# Patient Record
Sex: FEMALE | Race: ASIAN | NOT HISPANIC OR LATINO | Employment: STUDENT | URBAN - METROPOLITAN AREA
[De-identification: names, ages, dates, MRNs, and addresses within clinical notes are randomized per-mention and may not be internally consistent; named-entity substitution may affect disease eponyms.]

---

## 2023-05-24 ENCOUNTER — APPOINTMENT (OUTPATIENT)
Dept: ULTRASOUND IMAGING | Facility: CLINIC | Age: 28
End: 2023-05-24
Attending: EMERGENCY MEDICINE
Payer: COMMERCIAL

## 2023-05-24 ENCOUNTER — HOSPITAL ENCOUNTER (EMERGENCY)
Facility: CLINIC | Age: 28
Discharge: HOME | End: 2023-05-24
Attending: EMERGENCY MEDICINE | Admitting: EMERGENCY MEDICINE
Payer: COMMERCIAL

## 2023-05-24 VITALS
BODY MASS INDEX: 19.49 KG/M2 | HEART RATE: 96 BPM | WEIGHT: 121.25 LBS | RESPIRATION RATE: 18 BRPM | SYSTOLIC BLOOD PRESSURE: 115 MMHG | OXYGEN SATURATION: 100 % | TEMPERATURE: 96.9 F | DIASTOLIC BLOOD PRESSURE: 70 MMHG | HEIGHT: 66 IN

## 2023-05-24 DIAGNOSIS — R11.2 NAUSEA AND VOMITING, UNSPECIFIED VOMITING TYPE: ICD-10-CM

## 2023-05-24 DIAGNOSIS — D25.9 UTERINE LEIOMYOMA, UNSPECIFIED LOCATION: ICD-10-CM

## 2023-05-24 LAB
ALBUMIN UR-MCNC: 20 MG/DL
ANION GAP SERPL CALCULATED.3IONS-SCNC: 12 MMOL/L (ref 7–15)
APPEARANCE UR: CLEAR
BACTERIA #/AREA URNS HPF: ABNORMAL /HPF
BASOPHILS # BLD AUTO: 0 10E3/UL (ref 0–0.2)
BASOPHILS NFR BLD AUTO: 0 %
BILIRUB UR QL STRIP: NEGATIVE
BUN SERPL-MCNC: 11 MG/DL (ref 6–20)
CALCIUM SERPL-MCNC: 9.1 MG/DL (ref 8.6–10)
CHLORIDE SERPL-SCNC: 104 MMOL/L (ref 98–107)
COLOR UR AUTO: YELLOW
CREAT SERPL-MCNC: 0.49 MG/DL (ref 0.51–0.95)
DEPRECATED HCO3 PLAS-SCNC: 22 MMOL/L (ref 22–29)
EOSINOPHIL # BLD AUTO: 0 10E3/UL (ref 0–0.7)
EOSINOPHIL NFR BLD AUTO: 0 %
ERYTHROCYTE [DISTWIDTH] IN BLOOD BY AUTOMATED COUNT: 16.4 % (ref 10–15)
GFR SERPL CREATININE-BSD FRML MDRD: >90 ML/MIN/1.73M2
GLUCOSE SERPL-MCNC: 95 MG/DL (ref 70–99)
GLUCOSE UR STRIP-MCNC: 50 MG/DL
HCG SERPL QL: NEGATIVE
HCT VFR BLD AUTO: 36.7 % (ref 35–47)
HGB BLD-MCNC: 11.5 G/DL (ref 11.7–15.7)
HGB UR QL STRIP: ABNORMAL
HOLD SPECIMEN: NORMAL
IMM GRANULOCYTES # BLD: 0.1 10E3/UL
IMM GRANULOCYTES NFR BLD: 0 %
KETONES UR STRIP-MCNC: 80 MG/DL
LEUKOCYTE ESTERASE UR QL STRIP: NEGATIVE
LYMPHOCYTES # BLD AUTO: 1.7 10E3/UL (ref 0.8–5.3)
LYMPHOCYTES NFR BLD AUTO: 12 %
MCH RBC QN AUTO: 23.6 PG (ref 26.5–33)
MCHC RBC AUTO-ENTMCNC: 31.3 G/DL (ref 31.5–36.5)
MCV RBC AUTO: 75 FL (ref 78–100)
MONOCYTES # BLD AUTO: 0.6 10E3/UL (ref 0–1.3)
MONOCYTES NFR BLD AUTO: 4 %
MUCOUS THREADS #/AREA URNS LPF: PRESENT /LPF
NEUTROPHILS # BLD AUTO: 11.9 10E3/UL (ref 1.6–8.3)
NEUTROPHILS NFR BLD AUTO: 84 %
NITRATE UR QL: NEGATIVE
NRBC # BLD AUTO: 0 10E3/UL
NRBC BLD AUTO-RTO: 0 /100
PH UR STRIP: 6.5 [PH] (ref 5–7)
PLATELET # BLD AUTO: 409 10E3/UL (ref 150–450)
POTASSIUM SERPL-SCNC: 3.7 MMOL/L (ref 3.4–5.3)
RBC # BLD AUTO: 4.88 10E6/UL (ref 3.8–5.2)
RBC URINE: 133 /HPF
SODIUM SERPL-SCNC: 138 MMOL/L (ref 136–145)
SP GR UR STRIP: 1.03 (ref 1–1.03)
SQUAMOUS EPITHELIAL: 1 /HPF
UROBILINOGEN UR STRIP-MCNC: NORMAL MG/DL
WBC # BLD AUTO: 14.3 10E3/UL (ref 4–11)
WBC URINE: 1 /HPF

## 2023-05-24 PROCEDURE — 36415 COLL VENOUS BLD VENIPUNCTURE: CPT | Performed by: EMERGENCY MEDICINE

## 2023-05-24 PROCEDURE — 84703 CHORIONIC GONADOTROPIN ASSAY: CPT | Performed by: EMERGENCY MEDICINE

## 2023-05-24 PROCEDURE — 250N000011 HC RX IP 250 OP 636: Performed by: EMERGENCY MEDICINE

## 2023-05-24 PROCEDURE — 99284 EMERGENCY DEPT VISIT MOD MDM: CPT | Mod: 25

## 2023-05-24 PROCEDURE — 80048 BASIC METABOLIC PNL TOTAL CA: CPT | Performed by: EMERGENCY MEDICINE

## 2023-05-24 PROCEDURE — 76856 US EXAM PELVIC COMPLETE: CPT

## 2023-05-24 PROCEDURE — 81001 URINALYSIS AUTO W/SCOPE: CPT | Performed by: EMERGENCY MEDICINE

## 2023-05-24 PROCEDURE — 96360 HYDRATION IV INFUSION INIT: CPT

## 2023-05-24 PROCEDURE — 258N000003 HC RX IP 258 OP 636

## 2023-05-24 PROCEDURE — 85004 AUTOMATED DIFF WBC COUNT: CPT | Performed by: EMERGENCY MEDICINE

## 2023-05-24 RX ORDER — SODIUM CHLORIDE 9 MG/ML
INJECTION, SOLUTION INTRAVENOUS CONTINUOUS
Status: DISCONTINUED | OUTPATIENT
Start: 2023-05-24 | End: 2023-05-24 | Stop reason: HOSPADM

## 2023-05-24 RX ORDER — FERROUS SULFATE 325(65) MG
325 TABLET ORAL
Qty: 30 TABLET | Refills: 0 | Status: SHIPPED | OUTPATIENT
Start: 2023-05-24 | End: 2024-01-12

## 2023-05-24 RX ORDER — ONDANSETRON 2 MG/ML
4 INJECTION INTRAMUSCULAR; INTRAVENOUS EVERY 30 MIN PRN
Status: DISCONTINUED | OUTPATIENT
Start: 2023-05-24 | End: 2023-05-24 | Stop reason: HOSPADM

## 2023-05-24 RX ORDER — ONDANSETRON 4 MG/1
4 TABLET, ORALLY DISINTEGRATING ORAL ONCE
Status: COMPLETED | OUTPATIENT
Start: 2023-05-24 | End: 2023-05-24

## 2023-05-24 RX ORDER — ONDANSETRON 4 MG/1
4 TABLET, ORALLY DISINTEGRATING ORAL EVERY 8 HOURS PRN
Qty: 15 TABLET | Refills: 0 | Status: SHIPPED | OUTPATIENT
Start: 2023-05-24 | End: 2024-01-12

## 2023-05-24 RX ORDER — ACETAMINOPHEN 325 MG/1
975 TABLET ORAL ONCE
Status: COMPLETED | OUTPATIENT
Start: 2023-05-24 | End: 2023-05-24

## 2023-05-24 RX ORDER — IBUPROFEN 600 MG/1
600 TABLET, FILM COATED ORAL ONCE
Status: COMPLETED | OUTPATIENT
Start: 2023-05-24 | End: 2023-05-24

## 2023-05-24 RX ADMIN — ONDANSETRON 4 MG: 4 TABLET, ORALLY DISINTEGRATING ORAL at 20:07

## 2023-05-24 RX ADMIN — SODIUM CHLORIDE 1000 ML: 9 INJECTION, SOLUTION INTRAVENOUS at 20:37

## 2023-05-24 ASSESSMENT — ACTIVITIES OF DAILY LIVING (ADL)
ADLS_ACUITY_SCORE: 35
ADLS_ACUITY_SCORE: 35

## 2023-05-24 NOTE — ED TRIAGE NOTES
Pt presents with complaint of abd pain. Pt is currently on menses and states that she has abd pain with each cycle. Pt also states that she has vomited 13 times since this morning.      Triage Assessment     Row Name 05/24/23 2794       Triage Assessment (Adult)    Airway WDL WDL       Respiratory WDL    Respiratory WDL WDL       Skin Circulation/Temperature WDL    Skin Circulation/Temperature WDL WDL       Cardiac WDL    Cardiac WDL WDL       Peripheral/Neurovascular WDL    Peripheral Neurovascular WDL WDL       Cognitive/Neuro/Behavioral WDL    Cognitive/Neuro/Behavioral WDL WDL               [facility] - [phone]

## 2023-05-24 NOTE — ED NOTES
"Tele-PIT/Intake Evaluation      Video-Visit Details    Type of service:  Video Visit    Video Start Time (time video started): 5:45 PM  Video End Time (time video stopped): 5:50 PM   Originating Location (pt. Location):  [facility]  Distant Location (provider location):  [facility]  Mode of Communication:  Video Conference via Dizzion  Patient verbally consented to O-RID televisit.    History:  Patient presents with heavy vaginal bleeding and nausea vomiting which is consistent with previous menstrual cycles, she has been worked up in the past and has been told she has endometriosis.  She has vomited 13 times today and says she has not been able to keep any fluids down therefore she thought she should be seen in the emergency department.    Exam:  Cardiovascular: Well-perfused  Lungs: No respiratory distress  Patient Vitals for the past 24 hrs:   BP Temp Temp src Pulse Resp SpO2 Height Weight   05/24/23 1740 138/80 96.9  F (36.1  C) Temporal 88 18 100 % 1.676 m (5' 6\") 55 kg (121 lb 4.1 oz)       Appropriate interventions for symptom management were initiated if applicable.  Appropriate diagnostic tests were initiated if indicated.    Important information for subsequent clinician:  History of endometriosis and heavy periods, laboratory work-up undertaken and ultrasound ordered given the vomiting.    I briefly evaluated the patient and developed an initial plan of care. I discussed this plan and explained that this brief interaction does not constitute a full evaluation. Patient/family understands that they should wait to be fully evaluated and discuss any test results with another clinician prior to leaving the hospital.     Gee Soto MD  05/24/23 1751    "

## 2023-05-25 NOTE — DISCHARGE INSTRUCTIONS
Stay hydrated while taking Ferrous sulfate. This medication can cause constipation.  Can take MiraLAX if constipation occurs, according to label instructions.

## 2023-05-25 NOTE — ED PROVIDER NOTES
"ED ATTENDING PHYSICIAN NOTE:   I evaluated this patient in conjunction with Raven Avila PA-C  I have participated in the care of the patient and personally performed key elements of the history, exam, and medical decision making.      HPI:   Danny Villaeral is a 27 year old female who presents with nausea and vomiting. The patient reports that at baseline during her period she has abdominal pain, nausea, and vomiting. Her periods typically last about 25 days each. Her symptoms however, are more severe. She is on the 3rd day of her period; normal bleeding amount. She has vomited 13 times today. No fever or urinary symptoms. No sick contact. The patient denies any marijuana use.  Patient has had similar symptoms for the last 1.5 years.    Independent Historian:   None - Patient Only    Review of External Notes: N/A     EXAM:   /70   Pulse 96   Temp 96.9  F (36.1  C) (Temporal)   Resp 18   Ht 1.676 m (5' 6\")   Wt 55 kg (121 lb 4.1 oz)   SpO2 100%   BMI 19.57 kg/m      General: Alert and cooperative with exam. Patient in mild distress. Normal mentation.  Well-appearing  Head:  Scalp is NC/AT  Eyes:  No scleral icterus, PERRL  ENT:  The external nose and ears are normal. The oropharynx is normal and without erythema; mucus membranes are moist. Uvula midline, no evidence of deep space infection.  Neck:  Normal range of motion without rigidity.  CV:  Regular rate and rhythm    No pathologic murmur   Resp:  Breath sounds are clear bilaterally    Non-labored, no retractions or accessory muscle use  GI:  Abdomen is soft, no distension, no tenderness. No peritoneal signs  MS:  No lower extremity edema   Skin:  Warm and dry, No rash or lesions noted.  Neuro: Oriented x 3. No gross motor deficits.    Independent Interpretation (X-rays, CTs, rhythm strip):  None    Consultations/Discussion of Management or Tests:  None     Social Determinants of Health affecting care:   None     MEDICAL DECISION " MAKING/ASSESSMENT AND PLAN:    Patient is a 27-year-old female who presents with nausea and vomiting as well as lower abdominal pain/cramping during current menstrual period; has had similar episodes for the last 1-1/2 years.  Patient's medical history and records reviewed.  Patient's labs are notable for mildly elevated white count (14.3; likely stress/demargination reaction from vomiting), mild anemia (hemoglobin 11.5 with low MCV raising concern for potential iron deficiency; prescribed oral iron supplementation), and UA without evidence of infection though does demonstrate blood likely from current menses.  Abdominal exam benign.  Pelvic ultrasound demonstrates fibroid.  Pregnancy test is negative.  Patient was provided Zofran and IV fluids with improvement and able to tolerate oral intake.  No evidence of emergent process.  Discussed supportive care and provided prescription for Zofran.  Additionally recommended the patient establish care with PCP.  Return precautions discussed.  Patient discharged home.    DIAGNOSIS:     ICD-10-CM    1. Nausea and vomiting, unspecified vomiting type  R11.2       2. Uterine leiomyoma, unspecified location  D25.9           DISPOSITION:   The patient was discharged to home.      Scribe Disclosure:  Harley JACKSON, am serving as a scribe at 7:32 PM on 5/24/2023 to document services personally performed by Shawn Cervantes DO based on my observations and the provider's statements to me.      5/24/2023  [facility] EMERGENCY DEPT     Shawn Cervantes DO  05/25/23 5978

## 2023-05-25 NOTE — ED PROVIDER NOTES
History     Chief Complaint:  Abdominal Pain       HPI   Danny Villareal is a 27 year old female with a history of endometriosis and uterine fibroid who presents to the emergency department for evaluation of vaginal bleeding, abdominal pain, nausea, and vomiting.  The patient states that she has had heavy menstrual periods since her period started when she was 12.  She has been evaluated for this in [facility] several times.  In the last year she has had a CT scan which showed infective enteritis and had an ultrasound which showed evidence of endometriosis and uterine fibroid per patient's printed records.  The patient notes that over the last 2 years her menstrual cycles have been regular but continues to have moderate to heavy vaginal bleeding, lower abdominal cramping, nausea, and approximately 12-20 episodes of vomiting per cycle.  She has been given a pain medication and Zofran for control of her symptoms.  The patient notes that she typically counteract her symptoms by avoiding any fluids or food and will take Zofran.  Today, the patient states that she had approximately 13 episodes of vomiting and has not been able to drink anything without eliciting this.  She has taken the oral Zofran without improvement of symptoms causing concern.  She denies any recent fever, chills, chest pain, shortness of breath, hematemesis, dysuria, hematuria, urinary frequency/urgency, vaginal pain, vaginal discharge, or diarrhea.  She states she is not sexually active and is not concerned for pregnancy or STI today.  Of note, she previously was taking birth control for her symptoms but stopped taking this when she moved to the  5 months ago.      Independent Historian:    The patient provides a history.    Review of External Notes:  Chart reviewed, no external notes available.    Medications:    Ondansetron PRN    Past Medical History:    Uterine fibroid  Endometriosis    Past Surgical History:    No pertinent past surgical  "history.       Physical Exam     Patient Vitals for the past 24 hrs:   BP Temp Temp src Pulse Resp SpO2 Height Weight   05/24/23 1740 138/80 96.9  F (36.1  C) Temporal 88 18 100 % 1.676 m (5' 6\") 55 kg (121 lb 4.1 oz)        Physical Exam  General: Alert, appears well-developed and well-nourished. Cooperative.     In moderate distress  HEENT:  Head:  Atraumatic  Ears:  External ears are normal   Eyes:   Conjunctivae normal and EOM are normal. No scleral icterus.    Pupils are equal, round, and reactive to light.   Neck:   Normal range of motion. Neck supple.  CV:  Normal rate, regular rhythm, normal heart sounds and radial pulses are 2+ and symmetric.  No murmur.  Resp:  Breath sounds are clear bilaterally    Non-labored, no retractions or accessory muscle use  GI:  TTP over suprapubic area > LLQ, RLQ with voluntary guarding. Mild TTP over epigastrium, causes nausea per patient. Abdomen is soft, no distension. No rebound. No CVA tenderness bilaterally.  MS:  Normal range of motion. No edema.    Normal strength in all 4 extremities.     Back atraumatic.  Skin:  Warm and dry.  No rash or lesions noted.  Neuro: Alert. Normal strength.  Sensation intact in all 4 extremities. GCS: 15  Psych:  Normal mood and affect.    Emergency Department Course     Imaging:  US Pelvis Cmpl wo Transvaginal w Abd/Pel Duplex Lmt   Final Result   IMPRESSION:     1.  Fibroid posterior body measures 4.5 cm.      2.  No abnormal adnexal masses.                 Report per radiology    Laboratory:  Labs Ordered and Resulted from Time of ED Arrival to Time of ED Departure   BASIC METABOLIC PANEL - Abnormal       Result Value    Sodium 138      Potassium 3.7      Chloride 104      Carbon Dioxide (CO2) 22      Anion Gap 12      Urea Nitrogen 11.0      Creatinine 0.49 (*)     Calcium 9.1      Glucose 95      GFR Estimate >90     ROUTINE UA WITH MICROSCOPIC REFLEX TO CULTURE - Abnormal    Color Urine Yellow      Appearance Urine Clear      Glucose " Urine 50 (*)     Bilirubin Urine Negative      Ketones Urine 80 (*)     Specific Gravity Urine 1.029      Blood Urine Large (*)     pH Urine 6.5      Protein Albumin Urine 20 (*)     Urobilinogen Urine Normal      Nitrite Urine Negative      Leukocyte Esterase Urine Negative      Bacteria Urine Few (*)     Mucus Urine Present (*)     RBC Urine 133 (*)     WBC Urine 1      Squamous Epithelials Urine 1     CBC WITH PLATELETS AND DIFFERENTIAL - Abnormal    WBC Count 14.3 (*)     RBC Count 4.88      Hemoglobin 11.5 (*)     Hematocrit 36.7      MCV 75 (*)     MCH 23.6 (*)     MCHC 31.3 (*)     RDW 16.4 (*)     Platelet Count 409      % Neutrophils 84      % Lymphocytes 12      % Monocytes 4      % Eosinophils 0      % Basophils 0      % Immature Granulocytes 0      NRBCs per 100 WBC 0      Absolute Neutrophils 11.9 (*)     Absolute Lymphocytes 1.7      Absolute Monocytes 0.6      Absolute Eosinophils 0.0      Absolute Basophils 0.0      Absolute Immature Granulocytes 0.1      Absolute NRBCs 0.0     HCG QUALITATIVE PREGNANCY - Normal    hCG Serum Qualitative Negative          Procedures   None    Emergency Department Course & Assessments:       Interventions:  Medications   0.9% sodium chloride BOLUS (1,000 mLs Intravenous $New Bag 5/24/23 2037)     Followed by   sodium chloride 0.9% infusion (has no administration in time range)   ondansetron (ZOFRAN) injection 4 mg (has no administration in time range)   ondansetron (ZOFRAN ODT) ODT tab 4 mg (4 mg Oral $Given 5/24/23 2007)   acetaminophen (TYLENOL) tablet 975 mg (975 mg Oral Not Given 5/24/23 2011)   ibuprofen (ADVIL/MOTRIN) tablet 600 mg (600 mg Oral Not Given 5/24/23 2012)        Assessments:  1907: Initial evaluation and assessment.  2101: Patient reassessed, feels improved after IV Zofran and fluids.  Patient in agreement with plan for discharge home with close OB/GYN follow-up.    Independent Interpretation (X-rays, CTs, rhythm  strip):  None    Consultations/Discussion of Management or Tests:  None       Social Determinants of Health affecting care:  None     Disposition:  The patient was discharged to home.     Impression & Plan    CMS Diagnoses: None    Medical Decision Making:  Danny Villareal is a 27-year-old female with a history of a uterine fibroid who presented to the emergency department for evaluation of abdominal cramping, vaginal bleeding, nausea, and vomiting.  The patient has had heavy vaginal bleeding, abdominal cramping, nausea, and multiple episodes of vomiting associated with menses over the last year and a half.  She was previously evaluated for this in [place] and has had CT and ultrasound imaging which showed evidence of a uterine fibroid.  She has been given birth control, pain medication, and Zofran for her symptoms which did not provide significant relief today prompting her presentation to the ER.  On exam, the patient has lower abdominal tenderness without any peritoneal signs.  Her vital signs are within normal limits and there is no evidence of fever or tachycardia.  UA was obtained which shows evidence of hematuria without infection likely attributed to her current menses as she denies any hematuria otherwise.  She has a mildly elevated white blood cell count of 14.3 which is most likely attributed to multiple episodes of vomiting along with a hemoglobin of 11.5 with low MVC likely due to heavy menses.  BMP does not show any concerning signs of acute kidney injury or electrolyte abnormalities and the patient is not pregnant.  Pelvic ultrasound was performed which shows a posterior fibroid measuring 4.5 cm without any other concerning pelvic pathology.  The patient feels improved following IV fluids and Zofran.  Given that these symptoms are consistent with patient's history, she feels improved following interventions in the ED, without any concerning lab or imaging findings, we felt she was safe for discharged  home at this time.  We provided prescriptions for the patient of Zofran and ferrous sulfate to take as directed.  She was advised to have close follow-up with OB/GYN within the next week for reevaluation and ongoing management of her symptoms.  She should return to the ED if she develops any new or worsening symptoms in the interim.  She is in understanding of this plan all questions were answered.      Diagnosis:    ICD-10-CM    1. Nausea and vomiting, unspecified vomiting type  R11.2       2. Uterine leiomyoma, unspecified location  D25.9            Discharge Medications:  New Prescriptions    FERROUS SULFATE (FEROSUL) 325 (65 FE) MG TABLET    Take 1 tablet (325 mg) by mouth daily (with breakfast)    ONDANSETRON (ZOFRAN ODT) 4 MG ODT TAB    Take 1 tablet (4 mg) by mouth every 8 hours as needed for nausea          Miley Avila PA-C  5/24/2023

## 2024-01-09 ENCOUNTER — HOSPITAL ENCOUNTER (EMERGENCY)
Facility: CLINIC | Age: 29
Discharge: LEFT BEFORE DISCHARGE | End: 2024-01-09
Admitting: EMERGENCY MEDICINE
Payer: COMMERCIAL

## 2024-01-09 VITALS
TEMPERATURE: 97.3 F | RESPIRATION RATE: 18 BRPM | OXYGEN SATURATION: 97 % | SYSTOLIC BLOOD PRESSURE: 112 MMHG | DIASTOLIC BLOOD PRESSURE: 73 MMHG | BODY MASS INDEX: 21.07 KG/M2 | HEART RATE: 92 BPM | WEIGHT: 139 LBS | HEIGHT: 68 IN

## 2024-01-09 LAB
ALBUMIN SERPL BCG-MCNC: 4.5 G/DL (ref 3.5–5.2)
ALP SERPL-CCNC: 58 U/L (ref 40–150)
ALT SERPL W P-5'-P-CCNC: 13 U/L (ref 0–50)
ANION GAP SERPL CALCULATED.3IONS-SCNC: 8 MMOL/L (ref 7–15)
AST SERPL W P-5'-P-CCNC: 18 U/L (ref 0–45)
BASOPHILS # BLD AUTO: 0.1 10E3/UL (ref 0–0.2)
BASOPHILS NFR BLD AUTO: 1 %
BILIRUB SERPL-MCNC: 0.2 MG/DL
BUN SERPL-MCNC: 7.7 MG/DL (ref 6–20)
CALCIUM SERPL-MCNC: 8.8 MG/DL (ref 8.6–10)
CHLORIDE SERPL-SCNC: 106 MMOL/L (ref 98–107)
CREAT SERPL-MCNC: 0.54 MG/DL (ref 0.51–0.95)
DEPRECATED HCO3 PLAS-SCNC: 26 MMOL/L (ref 22–29)
EGFRCR SERPLBLD CKD-EPI 2021: >90 ML/MIN/1.73M2
EOSINOPHIL # BLD AUTO: 0.2 10E3/UL (ref 0–0.7)
EOSINOPHIL NFR BLD AUTO: 2 %
ERYTHROCYTE [DISTWIDTH] IN BLOOD BY AUTOMATED COUNT: 16.5 % (ref 10–15)
GLUCOSE SERPL-MCNC: 130 MG/DL (ref 70–99)
HCG SERPL QL: NEGATIVE
HCT VFR BLD AUTO: 30.9 % (ref 35–47)
HGB BLD-MCNC: 9.3 G/DL (ref 11.7–15.7)
HOLD SPECIMEN: NORMAL
IMM GRANULOCYTES # BLD: 0 10E3/UL
IMM GRANULOCYTES NFR BLD: 0 %
LYMPHOCYTES # BLD AUTO: 3.6 10E3/UL (ref 0.8–5.3)
LYMPHOCYTES NFR BLD AUTO: 34 %
MCH RBC QN AUTO: 22.5 PG (ref 26.5–33)
MCHC RBC AUTO-ENTMCNC: 30.1 G/DL (ref 31.5–36.5)
MCV RBC AUTO: 75 FL (ref 78–100)
MONOCYTES # BLD AUTO: 0.9 10E3/UL (ref 0–1.3)
MONOCYTES NFR BLD AUTO: 8 %
NEUTROPHILS # BLD AUTO: 5.9 10E3/UL (ref 1.6–8.3)
NEUTROPHILS NFR BLD AUTO: 55 %
NRBC # BLD AUTO: 0 10E3/UL
NRBC BLD AUTO-RTO: 0 /100
PLATELET # BLD AUTO: 541 10E3/UL (ref 150–450)
POTASSIUM SERPL-SCNC: 3.7 MMOL/L (ref 3.4–5.3)
PROT SERPL-MCNC: 7.6 G/DL (ref 6.4–8.3)
RBC # BLD AUTO: 4.13 10E6/UL (ref 3.8–5.2)
SODIUM SERPL-SCNC: 140 MMOL/L (ref 135–145)
WBC # BLD AUTO: 10.7 10E3/UL (ref 4–11)

## 2024-01-09 PROCEDURE — 85025 COMPLETE CBC W/AUTO DIFF WBC: CPT | Performed by: EMERGENCY MEDICINE

## 2024-01-09 PROCEDURE — 36415 COLL VENOUS BLD VENIPUNCTURE: CPT | Performed by: EMERGENCY MEDICINE

## 2024-01-09 PROCEDURE — 84703 CHORIONIC GONADOTROPIN ASSAY: CPT | Performed by: EMERGENCY MEDICINE

## 2024-01-09 PROCEDURE — 99281 EMR DPT VST MAYX REQ PHY/QHP: CPT

## 2024-01-09 PROCEDURE — 80053 COMPREHEN METABOLIC PANEL: CPT | Performed by: EMERGENCY MEDICINE

## 2024-01-09 NOTE — ED TRIAGE NOTES
Pt presents to ED with vaginal bleeding and a large, pink clot that came out of her vagina that was about 6 inches long. Pt has been on norethindone for a month to stop her periods to see if she had fibroids or any other abnormality in her reproductive system. Pt took her last dose of the medication today when the bleeding started earlier this morning at 0800. Pt have an appointment scheduled for tomorrow morning where they are planning on doing a scan. Pt advised not to come in to the hospital today, but she was concerned about the large clot.     Triage Assessment (Adult)       Row Name 01/09/24 0037          Triage Assessment    Airway WDL WDL        Respiratory WDL    Respiratory WDL WDL        Skin Circulation/Temperature WDL    Skin Circulation/Temperature WDL WDL        Cardiac WDL    Cardiac WDL WDL        Peripheral/Neurovascular WDL    Peripheral Neurovascular WDL WDL        Cognitive/Neuro/Behavioral WDL    Cognitive/Neuro/Behavioral WDL WDL

## 2024-01-12 RX ORDER — NORETHINDRONE ACETATE 5 MG
10 TABLET ORAL DAILY
COMMUNITY
End: 2024-02-13

## 2024-01-14 ENCOUNTER — ANESTHESIA EVENT (OUTPATIENT)
Dept: SURGERY | Facility: CLINIC | Age: 29
End: 2024-01-14
Payer: COMMERCIAL

## 2024-01-14 ASSESSMENT — LIFESTYLE VARIABLES: TOBACCO_USE: 0

## 2024-01-14 NOTE — ANESTHESIA PREPROCEDURE EVALUATION
Anesthesia Pre-Procedure Evaluation    Patient: Danny Villareal   MRN: [id number] : 1995        Procedure : Procedure(s):  hysteroscopy with myosure polypectomy          Past Medical History:   Diagnosis Date    Anemia     Endometriosis     Fibroid uterus       No past surgical history on file.   No Known Allergies   Social History     Tobacco Use    Smoking status: Not on file    Smokeless tobacco: Not on file   Substance Use Topics    Alcohol use: Not on file      Wt Readings from Last 1 Encounters:   23 55 kg (121 lb 4.1 oz)        Anesthesia Evaluation   Pt has had prior anesthetic. Type: General.    No history of anesthetic complications       ROS/MED HX  ENT/Pulmonary:    (-) tobacco use   Neurologic:       Cardiovascular:       METS/Exercise Tolerance:     Hematologic:     (+)      anemia,          Musculoskeletal:       GI/Hepatic:       Renal/Genitourinary:       Endo: Comment: Endometriosis      Psychiatric/Substance Use:       Infectious Disease:       Malignancy:       Other:            Physical Exam    Airway        Mallampati: II   TM distance: > 3 FB   Neck ROM: full   Mouth opening: > 3 cm    Respiratory Devices and Support         Dental           Cardiovascular   cardiovascular exam normal          Pulmonary   pulmonary exam normal        breath sounds clear to auscultation           OUTSIDE LABS:  CBC:   Lab Results   Component Value Date    WBC 10.7 2024    WBC 14.3 (H) 2023    HGB 9.3 (L) 2024    HGB 11.5 (L) 2023    HCT 30.9 (L) 2024    HCT 36.7 2023     (H) 2024     2023     BMP:   Lab Results   Component Value Date     2024     2023    POTASSIUM 3.7 2024    POTASSIUM 3.7 2023    CHLORIDE 106 2024    CHLORIDE 104 2023    CO2 26 2024    CO2 22 2023    BUN 7.7 2024    BUN 11.0 2023    CR 0.54 2024    CR 0.49 (L) 2023     (H)  "01/09/2024    GLC 95 05/24/2023     COAGS: No results found for: \"PTT\", \"INR\", \"FIBR\"  POC:   Lab Results   Component Value Date    HCGS Negative 01/09/2024     HEPATIC:   Lab Results   Component Value Date    ALBUMIN 4.5 01/09/2024    PROTTOTAL 7.6 01/09/2024    ALT 13 01/09/2024    AST 18 01/09/2024    ALKPHOS 58 01/09/2024    BILITOTAL 0.2 01/09/2024     OTHER:   Lab Results   Component Value Date    VIGNESH 8.8 01/09/2024       Anesthesia Plan    ASA Status:  2    NPO Status:  NPO Appropriate    Anesthesia Type: MAC.     - Reason for MAC: straight local not clinically adequate              Consents    Anesthesia Plan(s) and associated risks, benefits, and realistic alternatives discussed. Questions answered and patient/representative(s) expressed understanding.     - Discussed:     - Discussed with:  Patient      - Extended Intubation/Ventilatory Support Discussed: No.      - Patient is DNR/DNI Status: No     Use of blood products discussed: Yes.     - Discussed with: Patient.     - Consented: consented to blood products            Reason for refusal: other.     Postoperative Care    Pain management: IV analgesics, Oral pain medications, Multi-modal analgesia.   PONV prophylaxis: Ondansetron (or other 5HT-3), Dexamethasone or Solumedrol, Background Propofol Infusion     Comments:               Rasheed Gomez, DO    I have reviewed the pertinent notes and labs in the chart from the past 30 days and (re)examined the patient.  Any updates or changes from those notes are reflected in this note.                "

## 2024-01-15 ENCOUNTER — ANESTHESIA (OUTPATIENT)
Dept: SURGERY | Facility: CLINIC | Age: 29
End: 2024-01-15
Payer: COMMERCIAL

## 2024-01-15 ENCOUNTER — HOSPITAL ENCOUNTER (OUTPATIENT)
Facility: CLINIC | Age: 29
Discharge: HOME | End: 2024-01-15
Attending: STUDENT IN AN ORGANIZED HEALTH CARE EDUCATION/TRAINING PROGRAM | Admitting: STUDENT IN AN ORGANIZED HEALTH CARE EDUCATION/TRAINING PROGRAM
Payer: COMMERCIAL

## 2024-01-15 VITALS
RESPIRATION RATE: 14 BRPM | DIASTOLIC BLOOD PRESSURE: 68 MMHG | HEIGHT: 62 IN | BODY MASS INDEX: 24.84 KG/M2 | OXYGEN SATURATION: 100 % | TEMPERATURE: 98.1 F | SYSTOLIC BLOOD PRESSURE: 97 MMHG | HEART RATE: 77 BPM | WEIGHT: 135 LBS

## 2024-01-15 DIAGNOSIS — Z98.890 STATUS POST HYSTEROSCOPIC POLYPECTOMY: Primary | ICD-10-CM

## 2024-01-15 LAB — HCG UR QL: NEGATIVE

## 2024-01-15 PROCEDURE — 250N000009 HC RX 250: Performed by: NURSE ANESTHETIST, CERTIFIED REGISTERED

## 2024-01-15 PROCEDURE — 258N000001 HC RX 258: Performed by: STUDENT IN AN ORGANIZED HEALTH CARE EDUCATION/TRAINING PROGRAM

## 2024-01-15 PROCEDURE — 360N000076 HC SURGERY LEVEL 3, PER MIN: Performed by: STUDENT IN AN ORGANIZED HEALTH CARE EDUCATION/TRAINING PROGRAM

## 2024-01-15 PROCEDURE — 88305 TISSUE EXAM BY PATHOLOGIST: CPT | Mod: TC | Performed by: STUDENT IN AN ORGANIZED HEALTH CARE EDUCATION/TRAINING PROGRAM

## 2024-01-15 PROCEDURE — 710N000012 HC RECOVERY PHASE 2, PER MINUTE: Performed by: STUDENT IN AN ORGANIZED HEALTH CARE EDUCATION/TRAINING PROGRAM

## 2024-01-15 PROCEDURE — 370N000017 HC ANESTHESIA TECHNICAL FEE, PER MIN: Performed by: STUDENT IN AN ORGANIZED HEALTH CARE EDUCATION/TRAINING PROGRAM

## 2024-01-15 PROCEDURE — 710N000009 HC RECOVERY PHASE 1, LEVEL 1, PER MIN: Performed by: STUDENT IN AN ORGANIZED HEALTH CARE EDUCATION/TRAINING PROGRAM

## 2024-01-15 PROCEDURE — 88305 TISSUE EXAM BY PATHOLOGIST: CPT | Mod: 26 | Performed by: STUDENT IN AN ORGANIZED HEALTH CARE EDUCATION/TRAINING PROGRAM

## 2024-01-15 PROCEDURE — 258N000003 HC RX IP 258 OP 636: Performed by: NURSE ANESTHETIST, CERTIFIED REGISTERED

## 2024-01-15 PROCEDURE — 250N000011 HC RX IP 250 OP 636: Performed by: NURSE ANESTHETIST, CERTIFIED REGISTERED

## 2024-01-15 PROCEDURE — 250N000013 HC RX MED GY IP 250 OP 250 PS 637: Performed by: ANESTHESIOLOGY

## 2024-01-15 PROCEDURE — 999N000141 HC STATISTIC PRE-PROCEDURE NURSING ASSESSMENT: Performed by: STUDENT IN AN ORGANIZED HEALTH CARE EDUCATION/TRAINING PROGRAM

## 2024-01-15 PROCEDURE — 81025 URINE PREGNANCY TEST: CPT | Performed by: ANESTHESIOLOGY

## 2024-01-15 PROCEDURE — 272N000001 HC OR GENERAL SUPPLY STERILE: Performed by: STUDENT IN AN ORGANIZED HEALTH CARE EDUCATION/TRAINING PROGRAM

## 2024-01-15 RX ORDER — FENTANYL CITRATE 0.05 MG/ML
50 INJECTION, SOLUTION INTRAMUSCULAR; INTRAVENOUS EVERY 5 MIN PRN
Status: DISCONTINUED | OUTPATIENT
Start: 2024-01-15 | End: 2024-01-15 | Stop reason: HOSPADM

## 2024-01-15 RX ORDER — DEXAMETHASONE SODIUM PHOSPHATE 4 MG/ML
INJECTION, SOLUTION INTRA-ARTICULAR; INTRALESIONAL; INTRAMUSCULAR; INTRAVENOUS; SOFT TISSUE PRN
Status: DISCONTINUED | OUTPATIENT
Start: 2024-01-15 | End: 2024-01-15

## 2024-01-15 RX ORDER — PROPOFOL 10 MG/ML
INJECTION, EMULSION INTRAVENOUS PRN
Status: DISCONTINUED | OUTPATIENT
Start: 2024-01-15 | End: 2024-01-15

## 2024-01-15 RX ORDER — ONDANSETRON 2 MG/ML
4 INJECTION INTRAMUSCULAR; INTRAVENOUS EVERY 30 MIN PRN
Status: DISCONTINUED | OUTPATIENT
Start: 2024-01-15 | End: 2024-01-15 | Stop reason: HOSPADM

## 2024-01-15 RX ORDER — SODIUM CHLORIDE, SODIUM LACTATE, POTASSIUM CHLORIDE, CALCIUM CHLORIDE 600; 310; 30; 20 MG/100ML; MG/100ML; MG/100ML; MG/100ML
INJECTION, SOLUTION INTRAVENOUS CONTINUOUS PRN
Status: DISCONTINUED | OUTPATIENT
Start: 2024-01-15 | End: 2024-01-15

## 2024-01-15 RX ORDER — FENTANYL CITRATE 50 UG/ML
INJECTION, SOLUTION INTRAMUSCULAR; INTRAVENOUS PRN
Status: DISCONTINUED | OUTPATIENT
Start: 2024-01-15 | End: 2024-01-15

## 2024-01-15 RX ORDER — HYDROMORPHONE HCL IN WATER/PF 6 MG/30 ML
0.4 PATIENT CONTROLLED ANALGESIA SYRINGE INTRAVENOUS EVERY 5 MIN PRN
Status: DISCONTINUED | OUTPATIENT
Start: 2024-01-15 | End: 2024-01-15 | Stop reason: HOSPADM

## 2024-01-15 RX ORDER — DIMENHYDRINATE 50 MG/ML
25 INJECTION, SOLUTION INTRAMUSCULAR; INTRAVENOUS
Status: DISCONTINUED | OUTPATIENT
Start: 2024-01-15 | End: 2024-01-15 | Stop reason: HOSPADM

## 2024-01-15 RX ORDER — LABETALOL HYDROCHLORIDE 5 MG/ML
10 INJECTION, SOLUTION INTRAVENOUS
Status: DISCONTINUED | OUTPATIENT
Start: 2024-01-15 | End: 2024-01-15 | Stop reason: HOSPADM

## 2024-01-15 RX ORDER — HYDROMORPHONE HCL IN WATER/PF 6 MG/30 ML
0.2 PATIENT CONTROLLED ANALGESIA SYRINGE INTRAVENOUS EVERY 5 MIN PRN
Status: DISCONTINUED | OUTPATIENT
Start: 2024-01-15 | End: 2024-01-15 | Stop reason: HOSPADM

## 2024-01-15 RX ORDER — FENTANYL CITRATE 0.05 MG/ML
25 INJECTION, SOLUTION INTRAMUSCULAR; INTRAVENOUS EVERY 5 MIN PRN
Status: DISCONTINUED | OUTPATIENT
Start: 2024-01-15 | End: 2024-01-15 | Stop reason: HOSPADM

## 2024-01-15 RX ORDER — DEXMEDETOMIDINE HYDROCHLORIDE 4 UG/ML
INJECTION, SOLUTION INTRAVENOUS PRN
Status: DISCONTINUED | OUTPATIENT
Start: 2024-01-15 | End: 2024-01-15

## 2024-01-15 RX ORDER — HYDROXYZINE HYDROCHLORIDE 25 MG/1
25 TABLET, FILM COATED ORAL ONCE
Status: COMPLETED | OUTPATIENT
Start: 2024-01-15 | End: 2024-01-15

## 2024-01-15 RX ORDER — HYDROXYZINE HYDROCHLORIDE 25 MG/1
25 TABLET, FILM COATED ORAL EVERY 6 HOURS PRN
Status: DISCONTINUED | OUTPATIENT
Start: 2024-01-15 | End: 2024-01-15 | Stop reason: HOSPADM

## 2024-01-15 RX ORDER — ACETAMINOPHEN 325 MG/1
975 TABLET ORAL ONCE
Status: DISCONTINUED | OUTPATIENT
Start: 2024-01-15 | End: 2024-01-15 | Stop reason: HOSPADM

## 2024-01-15 RX ORDER — PROPOFOL 10 MG/ML
INJECTION, EMULSION INTRAVENOUS CONTINUOUS PRN
Status: DISCONTINUED | OUTPATIENT
Start: 2024-01-15 | End: 2024-01-15

## 2024-01-15 RX ORDER — ONDANSETRON 4 MG/1
4 TABLET, ORALLY DISINTEGRATING ORAL EVERY 30 MIN PRN
Status: DISCONTINUED | OUTPATIENT
Start: 2024-01-15 | End: 2024-01-15 | Stop reason: HOSPADM

## 2024-01-15 RX ORDER — BUPIVACAINE HYDROCHLORIDE 2.5 MG/ML
INJECTION, SOLUTION EPIDURAL; INFILTRATION; INTRACAUDAL
Status: DISCONTINUED
Start: 2024-01-15 | End: 2024-01-15 | Stop reason: HOSPADM

## 2024-01-15 RX ORDER — ACETAMINOPHEN 325 MG/1
975 TABLET ORAL ONCE
Status: COMPLETED | OUTPATIENT
Start: 2024-01-15 | End: 2024-01-15

## 2024-01-15 RX ORDER — ONDANSETRON 2 MG/ML
INJECTION INTRAMUSCULAR; INTRAVENOUS PRN
Status: DISCONTINUED | OUTPATIENT
Start: 2024-01-15 | End: 2024-01-15

## 2024-01-15 RX ORDER — DICYCLOMINE HYDROCHLORIDE 10 MG/1
10 CAPSULE ORAL
COMMUNITY
End: 2024-02-13

## 2024-01-15 RX ORDER — IBUPROFEN 400 MG/1
800 TABLET, FILM COATED ORAL ONCE
Status: DISCONTINUED | OUTPATIENT
Start: 2024-01-15 | End: 2024-01-15 | Stop reason: HOSPADM

## 2024-01-15 RX ORDER — KETOROLAC TROMETHAMINE 30 MG/ML
INJECTION, SOLUTION INTRAMUSCULAR; INTRAVENOUS PRN
Status: DISCONTINUED | OUTPATIENT
Start: 2024-01-15 | End: 2024-01-15

## 2024-01-15 RX ORDER — SODIUM CHLORIDE, SODIUM LACTATE, POTASSIUM CHLORIDE, CALCIUM CHLORIDE 600; 310; 30; 20 MG/100ML; MG/100ML; MG/100ML; MG/100ML
INJECTION, SOLUTION INTRAVENOUS CONTINUOUS
Status: DISCONTINUED | OUTPATIENT
Start: 2024-01-15 | End: 2024-01-15 | Stop reason: HOSPADM

## 2024-01-15 RX ORDER — LIDOCAINE 40 MG/G
CREAM TOPICAL
Status: DISCONTINUED | OUTPATIENT
Start: 2024-01-15 | End: 2024-01-15 | Stop reason: HOSPADM

## 2024-01-15 RX ORDER — HYDRALAZINE HYDROCHLORIDE 20 MG/ML
2.5-5 INJECTION INTRAMUSCULAR; INTRAVENOUS EVERY 10 MIN PRN
Status: DISCONTINUED | OUTPATIENT
Start: 2024-01-15 | End: 2024-01-15 | Stop reason: HOSPADM

## 2024-01-15 RX ORDER — OXYCODONE HYDROCHLORIDE 5 MG/1
5 TABLET ORAL
Status: DISCONTINUED | OUTPATIENT
Start: 2024-01-15 | End: 2024-01-15 | Stop reason: HOSPADM

## 2024-01-15 RX ORDER — IBUPROFEN 800 MG/1
800 TABLET, FILM COATED ORAL EVERY 6 HOURS PRN
Qty: 30 TABLET | Refills: 0 | Status: SHIPPED | OUTPATIENT
Start: 2024-01-15 | End: 2024-02-13

## 2024-01-15 RX ORDER — LIDOCAINE HYDROCHLORIDE 20 MG/ML
INJECTION, SOLUTION INFILTRATION; PERINEURAL PRN
Status: DISCONTINUED | OUTPATIENT
Start: 2024-01-15 | End: 2024-01-15

## 2024-01-15 RX ORDER — EPINEPHRINE 1 MG/ML
INJECTION, SOLUTION INTRAMUSCULAR; SUBCUTANEOUS
Status: DISCONTINUED
Start: 2024-01-15 | End: 2024-01-15 | Stop reason: HOSPADM

## 2024-01-15 RX ADMIN — HYDROXYZINE HYDROCHLORIDE 25 MG: 25 TABLET, FILM COATED ORAL at 07:12

## 2024-01-15 RX ADMIN — PROPOFOL 40 MG: 10 INJECTION, EMULSION INTRAVENOUS at 07:37

## 2024-01-15 RX ADMIN — PROPOFOL 150 MCG/KG/MIN: 10 INJECTION, EMULSION INTRAVENOUS at 07:37

## 2024-01-15 RX ADMIN — MIDAZOLAM 2 MG: 1 INJECTION INTRAMUSCULAR; INTRAVENOUS at 07:35

## 2024-01-15 RX ADMIN — FENTANYL CITRATE 50 MCG: 50 INJECTION INTRAMUSCULAR; INTRAVENOUS at 07:37

## 2024-01-15 RX ADMIN — DEXAMETHASONE SODIUM PHOSPHATE 4 MG: 4 INJECTION, SOLUTION INTRA-ARTICULAR; INTRALESIONAL; INTRAMUSCULAR; INTRAVENOUS; SOFT TISSUE at 07:44

## 2024-01-15 RX ADMIN — ACETAMINOPHEN 975 MG: 325 TABLET, FILM COATED ORAL at 07:12

## 2024-01-15 RX ADMIN — PHENYLEPHRINE HYDROCHLORIDE 100 MCG: 10 INJECTION INTRAVENOUS at 08:06

## 2024-01-15 RX ADMIN — DEXMEDETOMIDINE HYDROCHLORIDE 8 MCG: 200 INJECTION INTRAVENOUS at 07:37

## 2024-01-15 RX ADMIN — ONDANSETRON 4 MG: 2 INJECTION INTRAMUSCULAR; INTRAVENOUS at 07:44

## 2024-01-15 RX ADMIN — PROPOFOL 30 MG: 10 INJECTION, EMULSION INTRAVENOUS at 07:46

## 2024-01-15 RX ADMIN — DEXMEDETOMIDINE HYDROCHLORIDE 12 MCG: 200 INJECTION INTRAVENOUS at 07:46

## 2024-01-15 RX ADMIN — SODIUM CHLORIDE, POTASSIUM CHLORIDE, SODIUM LACTATE AND CALCIUM CHLORIDE: 600; 310; 30; 20 INJECTION, SOLUTION INTRAVENOUS at 07:35

## 2024-01-15 RX ADMIN — KETOROLAC TROMETHAMINE 30 MG: 30 INJECTION, SOLUTION INTRAMUSCULAR at 08:14

## 2024-01-15 RX ADMIN — FENTANYL CITRATE 50 MCG: 50 INJECTION INTRAMUSCULAR; INTRAVENOUS at 07:46

## 2024-01-15 RX ADMIN — LIDOCAINE HYDROCHLORIDE 60 MG: 20 INJECTION, SOLUTION INFILTRATION; PERINEURAL at 07:37

## 2024-01-15 RX ADMIN — PHENYLEPHRINE HYDROCHLORIDE 100 MCG: 10 INJECTION INTRAVENOUS at 07:52

## 2024-01-15 ASSESSMENT — ACTIVITIES OF DAILY LIVING (ADL)
ADLS_ACUITY_SCORE: 35

## 2024-01-15 NOTE — H&P
OB HISTORY AND PHYSICAL    Patient Name: Danny Villareal   Admit Date: 115  MR #: [id number]   Acct #: [id number]   : 1995     Chief Complaint/Reason for Visit: hysteroscopy    History of Present Illness: Danny Villareal  is a 28 year old G0 F here for surgical mgmt of abnormal uterine bleeding. She has had heavy vaginal bleeding, TVUS c/w multiple endometrial polyps. She is on aygestin 10mg po daily, bleeding improved since starting med.    Review of Systems:  General: denies fevers  CV: denies CP  Pulm: denies SOB  Neuro: denies HA  Abd: denies N/V  Gyn: denies vaginal discharge  Skin: denies rashes  MSK: denies calf pain  Psych: denies depression     History:   OB History   No obstetric history on file.       Past Medical History:   Diagnosis Date    Anemia     Endometriosis     Fibroid uterus        History reviewed. No pertinent surgical history.    History reviewed. No pertinent family history.    Social History     Socioeconomic History    Marital status: Single     Spouse name: Not on file    Number of children: Not on file    Years of education: Not on file    Highest education level: Not on file   Occupational History    Not on file   Tobacco Use    Smoking status: Never    Smokeless tobacco: Never   Substance and Sexual Activity    Alcohol use: Yes    Drug use: Never    Sexual activity: Not on file   Other Topics Concern    Not on file   Social History Narrative    Not on file     Social Determinants of Health     Financial Resource Strain: Not on file   Food Insecurity: Not on file   Transportation Needs: Not on file   Physical Activity: Not on file   Stress: Not on file   Social Connections: Not on file   Interpersonal Safety: Not on file   Housing Stability: Not on file       Allergy Information:        I have reviewed the patient's allergies.      ALLERGY@    Home Medications:   No current outpatient medications on file.       Physical Examination:  Vitals:  Temp:  [98.3  F (36.8  C)]  98.3  F (36.8  C)  Pulse:  [94] 94  Resp:  [16] 16  BP: (119)/(76) 119/76  SpO2:  [100 %] 100 %    Exam:  General: no acute distress  Head: normocephalic, atraumatic  Eyes: EOMI  CV: pulses intact  Pulm: no increased effort  Neuro: CN II-XII grossly intact  Abd: soft, NTTP  Gyn: deferred  Skin: no rashes  MSK: no calf tenderness  Psych: AOx3, appropriate mood/affect    Laboratory and Additional Data Reviewed:  Any associated labs, path, imaging personally reviewed  Results for orders placed or performed during the hospital encounter of 01/15/24   HCG qualitative urine - Pre-Op     Status: Normal   Result Value Ref Range    hCG Urine Qualitative Negative Negative         Assessment and Plan: Danny Villareal is a 28 year old G0 F here for surgical management of AUB  - pt with heavy vaginal bleeding and passage of tissue. TVUS c/w multiple endometrial polyps  - disc polyps likely source of heavy bleeding, recommend hysteroscopic polypectomy. Disc r/b/a of procedure. All questions answered. Preop consents reviewed and signed  - UPT neg  - hbg 9.3 last week    Dispo: to [place] for [facility] [place], anticipate discharge to home following procedure      MD Varghese Martini OBG[facility], PA  1/15/2024, 7:19 AM

## 2024-01-15 NOTE — DISCHARGE INSTRUCTIONS
Today you received Toradol, an antiinflammatory medication similar to Ibuprofen.  You should not take other antiinflammatory medication, such as Ibuprofen, Motrin, Advil, Aleve, Naprosyn, etc until 2 pm.    Today you were given 975 mg of Tylenol at 7:12. The recommended daily maximum dose is 4000 mg.            Same Day Surgery Discharge Instructions for  Sedation and General Anesthesia     It's not unusual to feel dizzy, light-headed or faint for up to 24 hours after surgery or while taking pain medication.  If you have these symptoms: sit for a few minutes before standing and have someone assist you when you get up to walk or use the bathroom.    You should rest and relax for the next 24 hours. We recommend you make arrangements to have an adult stay with you for at least 24 hours after your discharge.  Avoid hazardous and strenuous activity.    DO NOT DRIVE any vehicle or operate mechanical equipment for 24 hours following the end of your surgery.  Even though you may feel normal, your reactions may be affected by the medication you have received.    Do not drink alcoholic beverages for 24 hours following surgery.     Slowly progress to your regular diet as you feel able. It's not unusual to feel nauseated and/or vomit after receiving anesthesia.  If you develop these symptoms, drink clear liquids (apple juice, ginger ale, broth, 7-up, etc. ) until you feel better.  If your nausea and vomiting persists for 24 hours, please notify your surgeon.      All narcotic pain medications, along with inactivity and anesthesia, can cause constipation. Drinking plenty of liquids and increasing fiber intake will help.    For any questions of a medical nature, call your surgeon.    Do not make important decisions for 24 hours.    If you had general anesthesia, you may have a sore throat for a couple of days related to the breathing tube used during surgery.  You may use Cepacol lozenges to help with this discomfort.  If it  worsens or if you develop a fever, contact your surgeon.     If you feel your pain is not well managed with the pain medications prescribed by your surgeon, please contact your surgeon's office to let them know so they can address your concerns.          **If you have questions or concerns about your procedure,   call Dr. Thacker at  **

## 2024-01-15 NOTE — OP NOTE
Hysteroscopic Polypectomy OPERATIVE NOTE    Patient ID:  Danny Villareal MRN: [id number]   Age: 28 year old   : 1995                                                        .    Date of Surgery:  01/15/24    Pre-Op Diagnoses: Abnormal uterine bleeding, suspected endometrial polyp     Post-Op Diagnoses: same    Indications: Danny Villareal is a 27yo G0 F who presented to the office with heavy vaginal bleeding, TVUS c/w endometrial polyps.    Procedure Performed:   1). Exam under Anesthesia  2). Diagnostic hysteroscopy, hysteroscopic polypectomy    Surgeon: Flora Forbes MD    Anesthesiologist and Assistants: Anesthesiologist: Darryl Charles MD  CRNA: Rosaura Khan APRN CRNA    Type of Anesthesia: MAC    Consents were signed and reviewed. yes    Time out was completed to identify the patient, planned procedure, and any known allergies to drugs or products times two.    PROCEDURE IN DETAIL   The patient was seen in the Holding Room. The risks, benefits, complications, treatment options, and expected outcomes were discussed with the patient.  The patient concurred with the proposed plan, giving informed consent.      After reviewing informed consent, the patient was taken to the OR where time out was taken to identify patient, planned procedure and any known allergy to drugs or products.  The patient was then placed in the dorsal supine position.  MAC anesthesia was administered without complication and found to be adequate.     The patient was then placed in the dorsal lithotomy position in Néstor stirrups.  The vulva, vagina and perineum were then prepped and draped in normal sterile fashion.     A red rubber catheter was used to drain the bladder, and approximately 150 cc's of urine was obtained.  An exam under anesthesia was performed, which revealed a uterus that was noted to be approximately 6 weeks size.  No adnexal masses were appreciated.    A sterile speculum was then placed in the  vagina and the cervix was identified.  The anterior lip of the cervix was grasped with a single-toothed tenaculum. A paracervical block was then performed using apprximately 10 cc's of 0.25% Bupivacaine with Epinephrine.    The uterus was then gradually dilated to accomodate a 5mm operative Myosure hysteroscope. Intrauterine evaluation c/w multiple subcentimeter endometrial polyps. No fibroids visualized. The Myosure lite was then advanced through the hysteroscope and used to resect the polyps under direct visualization.     At the end of the procedure, there was minimal bleeding noted.  The tenaculum was removed with excellent hemostasis noted at the tenaculum sites.    The patient tolerated the procedure well and was taken to the PACU in stable condition.     Complications: none, however myosure fluid system did not collect out-flowing fluid very well, a few hundred mLs of fluid on the floor.  Counts: Sponge, instrument, and needle counts were correct prior to and after closure.   Specimens: none  Estimated Blood Loss 10 mL  Urine Output: 150 mL of clear, yellow urine at the beginning of the case  Fluid Deficit: 500 mL (likely less, overall best estimate due to fluid on floor)  Prophylactic Antibiotics: not indicated   DVT/PE Prophylaxis: SCDs were on and functioning during entire case  Patient disposition: stable to PACU. Anticipate discharge to home.

## 2024-01-15 NOTE — ANESTHESIA POSTPROCEDURE EVALUATION
Patient: Danny Villareal    Procedure: Procedure(s):  hysteroscopy with myosure polypectomy       Anesthesia Type:  MAC    Note:     Postop Pain Control: Uneventful            Sign Out: Well controlled pain   PONV: No   Neuro/Psych: Uneventful            Sign Out: Acceptable/Baseline neuro status   Airway/Respiratory: Uneventful            Sign Out: Acceptable/Baseline resp. status   CV/Hemodynamics: Uneventful            Sign Out: Acceptable CV status   Other NRE: NONE   DID A NON-ROUTINE EVENT OCCUR?            Last vitals:  Vitals Value Taken Time   BP 94/62 01/15/24 0930   Temp 36.7  C (98  F) 01/15/24 0915   Pulse 78 01/15/24 0939   Resp 13 01/15/24 0939   SpO2 100 % 01/15/24 0939   Vitals shown include unfiled device data.    Electronically Signed By: Darryl Charles MD  January 15, 2024  9:40 AM

## 2024-01-15 NOTE — ANESTHESIA CARE TRANSFER NOTE
Patient: Danny Villareal    Procedure: Procedure(s):  hysteroscopy with myosure polypectomy       Diagnosis: Abnormal uterine bleeding [N93.9]  Diagnosis Additional Information: No value filed.    Anesthesia Type:   MAC     Note:    Oropharynx: oropharynx clear of all foreign objects and spontaneously breathing  Level of Consciousness: awake and drowsy  Oxygen Supplementation: face mask  Level of Supplemental Oxygen (L/min / FiO2): 6  Independent Airway: airway patency satisfactory and stable  Dentition: dentition unchanged  Vital Signs Stable: post-procedure vital signs reviewed and stable  Report to RN Given: handoff report given  Patient transferred to: PACU  Comments: At end of procedure, spontaneous respirations, patient alert to voice, able to follow commands. Oxygen via facemask at 6 liters per minute to PACU. Oxygen tubing connected to wall O2 in PACU, SpO2, NiBP, and EKG monitors and alarms on and functioning, Carlos Hugger warmer connected to patient gown, report on patient's clinical status given to PACU RN, RN questions answered.      Handoff Report: Identifed the Patient, Identified the Reponsible Provider, Reviewed the pertinent medical history, Discussed the surgical course, Reviewed Intra-OP anesthesia mangement and issues during anesthesia, Set expectations for post-procedure period and Allowed opportunity for questions and acknowledgement of understanding      Vitals:  Vitals Value Taken Time   BP     Temp     Pulse 83 01/15/24 0826   Resp 14 01/15/24 0826   SpO2 100 % 01/15/24 0826   Vitals shown include unfiled device data.    Electronically Signed By: JUNE Schaefer CRNA  January 15, 2024  8:27 AM

## 2024-01-16 LAB
PATH REPORT.COMMENTS IMP SPEC: NORMAL
PATH REPORT.COMMENTS IMP SPEC: NORMAL
PATH REPORT.FINAL DX SPEC: NORMAL
PATH REPORT.GROSS SPEC: NORMAL
PATH REPORT.MICROSCOPIC SPEC OTHER STN: NORMAL
PATH REPORT.RELEVANT HX SPEC: NORMAL
PHOTO IMAGE: NORMAL

## 2024-02-12 ENCOUNTER — HOSPITAL ENCOUNTER (EMERGENCY)
Facility: CLINIC | Age: 29
Discharge: HOME | End: 2024-02-12
Attending: STUDENT IN AN ORGANIZED HEALTH CARE EDUCATION/TRAINING PROGRAM | Admitting: STUDENT IN AN ORGANIZED HEALTH CARE EDUCATION/TRAINING PROGRAM
Payer: COMMERCIAL

## 2024-02-12 ENCOUNTER — APPOINTMENT (OUTPATIENT)
Dept: ULTRASOUND IMAGING | Facility: CLINIC | Age: 29
End: 2024-02-12
Attending: STUDENT IN AN ORGANIZED HEALTH CARE EDUCATION/TRAINING PROGRAM
Payer: COMMERCIAL

## 2024-02-12 VITALS
RESPIRATION RATE: 16 BRPM | HEIGHT: 66 IN | TEMPERATURE: 98.1 F | SYSTOLIC BLOOD PRESSURE: 110 MMHG | WEIGHT: 138.89 LBS | BODY MASS INDEX: 22.32 KG/M2 | OXYGEN SATURATION: 100 % | DIASTOLIC BLOOD PRESSURE: 75 MMHG | HEART RATE: 88 BPM

## 2024-02-12 DIAGNOSIS — N93.9 VAGINAL BLEEDING: ICD-10-CM

## 2024-02-12 DIAGNOSIS — R10.30 LOWER ABDOMINAL PAIN: ICD-10-CM

## 2024-02-12 LAB
ALBUMIN SERPL BCG-MCNC: 4.2 G/DL (ref 3.5–5.2)
ALP SERPL-CCNC: 64 U/L (ref 40–150)
ALT SERPL W P-5'-P-CCNC: 11 U/L (ref 0–50)
ANION GAP SERPL CALCULATED.3IONS-SCNC: 10 MMOL/L (ref 7–15)
AST SERPL W P-5'-P-CCNC: 20 U/L (ref 0–45)
BASOPHILS # BLD AUTO: 0.1 10E3/UL (ref 0–0.2)
BASOPHILS NFR BLD AUTO: 0 %
BILIRUB SERPL-MCNC: 0.5 MG/DL
BUN SERPL-MCNC: 9.9 MG/DL (ref 6–20)
CALCIUM SERPL-MCNC: 8.5 MG/DL (ref 8.6–10)
CHLORIDE SERPL-SCNC: 104 MMOL/L (ref 98–107)
CREAT SERPL-MCNC: 0.6 MG/DL (ref 0.51–0.95)
DEPRECATED HCO3 PLAS-SCNC: 23 MMOL/L (ref 22–29)
EGFRCR SERPLBLD CKD-EPI 2021: >90 ML/MIN/1.73M2
EOSINOPHIL # BLD AUTO: 0 10E3/UL (ref 0–0.7)
EOSINOPHIL NFR BLD AUTO: 0 %
ERYTHROCYTE [DISTWIDTH] IN BLOOD BY AUTOMATED COUNT: 21 % (ref 10–15)
GLUCOSE SERPL-MCNC: 108 MG/DL (ref 70–99)
HCG INTACT+B SERPL-ACNC: <1 MIU/ML
HCT VFR BLD AUTO: 30.2 % (ref 35–47)
HGB BLD-MCNC: 8.8 G/DL (ref 11.7–15.7)
HOLD SPECIMEN: NORMAL
IMM GRANULOCYTES # BLD: 0.1 10E3/UL
IMM GRANULOCYTES NFR BLD: 1 %
LYMPHOCYTES # BLD AUTO: 1.6 10E3/UL (ref 0.8–5.3)
LYMPHOCYTES NFR BLD AUTO: 14 %
MCH RBC QN AUTO: 20.7 PG (ref 26.5–33)
MCHC RBC AUTO-ENTMCNC: 29.1 G/DL (ref 31.5–36.5)
MCV RBC AUTO: 71 FL (ref 78–100)
MONOCYTES # BLD AUTO: 0.7 10E3/UL (ref 0–1.3)
MONOCYTES NFR BLD AUTO: 6 %
NEUTROPHILS # BLD AUTO: 8.9 10E3/UL (ref 1.6–8.3)
NEUTROPHILS NFR BLD AUTO: 79 %
NRBC # BLD AUTO: 0 10E3/UL
NRBC BLD AUTO-RTO: 0 /100
PLATELET # BLD AUTO: 372 10E3/UL (ref 150–450)
POTASSIUM SERPL-SCNC: 3.7 MMOL/L (ref 3.4–5.3)
PROT SERPL-MCNC: 7.4 G/DL (ref 6.4–8.3)
RBC # BLD AUTO: 4.26 10E6/UL (ref 3.8–5.2)
SODIUM SERPL-SCNC: 137 MMOL/L (ref 135–145)
WBC # BLD AUTO: 11.4 10E3/UL (ref 4–11)

## 2024-02-12 PROCEDURE — 99285 EMERGENCY DEPT VISIT HI MDM: CPT | Mod: 25

## 2024-02-12 PROCEDURE — 96375 TX/PRO/DX INJ NEW DRUG ADDON: CPT

## 2024-02-12 PROCEDURE — 250N000013 HC RX MED GY IP 250 OP 250 PS 637: Performed by: STUDENT IN AN ORGANIZED HEALTH CARE EDUCATION/TRAINING PROGRAM

## 2024-02-12 PROCEDURE — 258N000003 HC RX IP 258 OP 636: Performed by: STUDENT IN AN ORGANIZED HEALTH CARE EDUCATION/TRAINING PROGRAM

## 2024-02-12 PROCEDURE — 36415 COLL VENOUS BLD VENIPUNCTURE: CPT | Performed by: STUDENT IN AN ORGANIZED HEALTH CARE EDUCATION/TRAINING PROGRAM

## 2024-02-12 PROCEDURE — 85025 COMPLETE CBC W/AUTO DIFF WBC: CPT | Performed by: STUDENT IN AN ORGANIZED HEALTH CARE EDUCATION/TRAINING PROGRAM

## 2024-02-12 PROCEDURE — 96374 THER/PROPH/DIAG INJ IV PUSH: CPT

## 2024-02-12 PROCEDURE — 250N000011 HC RX IP 250 OP 636: Performed by: STUDENT IN AN ORGANIZED HEALTH CARE EDUCATION/TRAINING PROGRAM

## 2024-02-12 PROCEDURE — 96361 HYDRATE IV INFUSION ADD-ON: CPT

## 2024-02-12 PROCEDURE — 76856 US EXAM PELVIC COMPLETE: CPT

## 2024-02-12 PROCEDURE — 84702 CHORIONIC GONADOTROPIN TEST: CPT | Performed by: STUDENT IN AN ORGANIZED HEALTH CARE EDUCATION/TRAINING PROGRAM

## 2024-02-12 PROCEDURE — 80053 COMPREHEN METABOLIC PANEL: CPT | Performed by: STUDENT IN AN ORGANIZED HEALTH CARE EDUCATION/TRAINING PROGRAM

## 2024-02-12 RX ORDER — MEDROXYPROGESTERONE ACETATE 10 MG
10 TABLET ORAL DAILY
Qty: 10 TABLET | Refills: 0 | Status: ON HOLD | OUTPATIENT
Start: 2024-02-12 | End: 2024-02-14

## 2024-02-12 RX ORDER — MAGNESIUM HYDROXIDE/ALUMINUM HYDROXICE/SIMETHICONE 120; 1200; 1200 MG/30ML; MG/30ML; MG/30ML
15 SUSPENSION ORAL ONCE
Status: COMPLETED | OUTPATIENT
Start: 2024-02-12 | End: 2024-02-12

## 2024-02-12 RX ORDER — KETOROLAC TROMETHAMINE 15 MG/ML
15 INJECTION, SOLUTION INTRAMUSCULAR; INTRAVENOUS ONCE
Status: COMPLETED | OUTPATIENT
Start: 2024-02-12 | End: 2024-02-12

## 2024-02-12 RX ORDER — ONDANSETRON 2 MG/ML
4 INJECTION INTRAMUSCULAR; INTRAVENOUS ONCE
Status: COMPLETED | OUTPATIENT
Start: 2024-02-12 | End: 2024-02-12

## 2024-02-12 RX ORDER — SIMETHICONE 125 MG
125 TABLET,CHEWABLE ORAL 2 TIMES DAILY
Qty: 20 TABLET | Refills: 0 | Status: SHIPPED | OUTPATIENT
Start: 2024-02-12

## 2024-02-12 RX ORDER — MORPHINE SULFATE 4 MG/ML
4 INJECTION, SOLUTION INTRAMUSCULAR; INTRAVENOUS ONCE
Status: COMPLETED | OUTPATIENT
Start: 2024-02-12 | End: 2024-02-12

## 2024-02-12 RX ADMIN — ONDANSETRON 4 MG: 2 INJECTION INTRAMUSCULAR; INTRAVENOUS at 10:58

## 2024-02-12 RX ADMIN — MORPHINE SULFATE 4 MG: 4 INJECTION, SOLUTION INTRAMUSCULAR; INTRAVENOUS at 10:58

## 2024-02-12 RX ADMIN — KETOROLAC TROMETHAMINE 15 MG: 15 INJECTION, SOLUTION INTRAMUSCULAR; INTRAVENOUS at 12:58

## 2024-02-12 RX ADMIN — ALUMINUM HYDROXIDE, MAGNESIUM HYDROXIDE, AND SIMETHICONE 15 ML: 200; 200; 20 SUSPENSION ORAL at 12:59

## 2024-02-12 RX ADMIN — SODIUM CHLORIDE 1000 ML: 9 INJECTION, SOLUTION INTRAVENOUS at 13:48

## 2024-02-12 ASSESSMENT — ACTIVITIES OF DAILY LIVING (ADL)
ADLS_ACUITY_SCORE: 35
ADLS_ACUITY_SCORE: 35

## 2024-02-12 NOTE — ED PROVIDER NOTES
"  History     Chief Complaint:  Vaginal Bleeding    The history is provided by the patient.     Danny Villareal is a 28 year old female one month s/p hysteroscopic polypectomy presenting for evaluation of vaginal bleeding. Danny reports pain in her lower abdominal and rectum for the past three days, accompanied by vaginal bleeding with some small clots. She notes soaking through one pad every four hours. She also notes vomiting. She has passed stool since pain onset and denies pain with defecating. She reports use of ibuprofen for pain. She adds that she recently switched estrogen at the same time as the bleeding and abdominal discomfort onset.    Denies shortness of breath to me.  Independent Historian:    None - Patient Only    Review of External Notes:  I reviewed the 1/15/24 surgery note for polypectomy.    Allergies:  No Known Allergies     Physical Exam   Patient Vitals for the past 24 hrs:   BP Temp Temp src Pulse Resp SpO2 Height Weight   02/12/24 1317 122/81 -- -- 80 16 100 % -- --   02/12/24 1300 116/86 -- -- 81 -- 100 % -- --   02/12/24 1258 117/80 -- -- 79 -- 100 % -- --   02/12/24 1011 119/75 98.1  F (36.7  C) Temporal 101 20 100 % 1.676 m (5' 6\") 63 kg (138 lb 14.2 oz)     Physical Exam  GENERAL: Patient uncomfortable, but nontoxic.  HEAD: Atraumatic.  NECK: No rigidity  CV: RRR, no murmurs rubs or gallops  PULM: CTAB with good aeration; no retractions, rales, rhonchi, or wheezing  ABD: Soft, nondistended, no guarding. Diffuse lower abdominal tenderness.  No rigidity.  DERM: No rash. Skin warm and dry  EXTREMITY: Moving all extremities without difficulty. No calf tenderness or peripheral edema  VASCULAR: Symmetric pulses bilaterally  Pelvic: Female chaperone present.  Small amount of bright red blood in the os and clot of blood recently expelled from vagina.  Patient cannot tolerate the full procedure due to discomfort.  Rectal exam: Chaperone present.  No palpable mass.  No melena.  Emergency " Department Course        Laboratory: Imaging:   Labs Ordered and Resulted from Time of ED Arrival to Time of ED Departure   COMPREHENSIVE METABOLIC PANEL - Abnormal       Result Value    Sodium 137      Potassium 3.7      Carbon Dioxide (CO2) 23      Anion Gap 10      Urea Nitrogen 9.9      Creatinine 0.60      GFR Estimate >90      Calcium 8.5 (*)     Chloride 104      Glucose 108 (*)     Alkaline Phosphatase 64      AST 20      ALT 11      Protein Total 7.4      Albumin 4.2      Bilirubin Total 0.5     CBC WITH PLATELETS AND DIFFERENTIAL - Abnormal    WBC Count 11.4 (*)     RBC Count 4.26      Hemoglobin 8.8 (*)     Hematocrit 30.2 (*)     MCV 71 (*)     MCH 20.7 (*)     MCHC 29.1 (*)     RDW 21.0 (*)     Platelet Count 372      % Neutrophils 79      % Lymphocytes 14      % Monocytes 6      % Eosinophils 0      % Basophils 0      % Immature Granulocytes 1      NRBCs per 100 WBC 0      Absolute Neutrophils 8.9 (*)     Absolute Lymphocytes 1.6      Absolute Monocytes 0.7      Absolute Eosinophils 0.0      Absolute Basophils 0.1      Absolute Immature Granulocytes 0.1      Absolute NRBCs 0.0     HCG QUANTITATIVE PREGNANCY - Normal    hCG Quantitative <1       US Pelvis Complete without Transvaginal   Final Result   IMPRESSION:   1.  6 cm uterine fibroid has increased in size from previous 4.5 cm.   Otherwise normal pelvic ultrasound.         SANJAY CORRAL MD            SYSTEM ID:  [id number]              Procedures     Emergency Department Course & Assessments:       Interventions:  Medications   sodium chloride 0.9% BOLUS 1,000 mL (1,000 mLs Intravenous $New Bag 2/12/24 1348)   morphine (PF) injection 4 mg (4 mg Intravenous $Given 2/12/24 1058)   ondansetron (ZOFRAN) injection 4 mg (4 mg Intravenous $Given 2/12/24 1058)   ketorolac (TORADOL) injection 15 mg (15 mg Intravenous $Given 2/12/24 1258)   alum & mag hydroxide-simethicone (MAALOX) suspension 15 mL (15 mLs Oral $Given 2/12/24 1259)        Assessments,  Independent Interpretation, Consult/Discussion of ManagementTests:  ED Course as of 02/12/24 1433   Mon Feb 12, 2024   1031 I obtained history and examined the patient as noted above.    Dr. Forbes - obstetrician - discussed treatment and intervention.    Social Determinants of Health affecting care:  None    Disposition:  The patient was discharged to home.     Impression & Plan         Medical Decision Making:  Symptoms consistent with abnormal vaginal bleeding.    VS reassuring.  Minimal increase in heart rate on initial assessment but improved.    Chronic conditions complicating-recent polypectomy    DDx considered, but not limited to ectopic pregnancy, tubo-ovarian abscess, ovarian torsion, however evaluation not consistent with these etiologies.    Labs showing baseline anemia 8.8.  Was 9.3, 1-month ago.  hCG negative.    Given morphine initially which significantly improved her pain.    Transvaginal ultrasound showing 6 cm fibroid, increased to 4.5 cm.  Otherwise unremarkable.    Tempted pelvic exam, but patient could not tolerate procedure.  I did discuss with her obstetrician and patient required sedatives to complete prior exams due to sensitivities.    Patient also states she had some gas pain so she was given GI cocktail and Toradol.  She is ultimately feeling much better.  Repeat abdominal exam is benign.  She feels well to go home.  She is feeling slightly lightheaded with ambulation so I gave her IV fluids and she was also able to tolerate eating and drinking and she felt improved and ready to go home.    In discussion with her obstetrician, started patient on medroxyprogesterone and she will follow-up tomorrow with her.    I have evaluated the patient for acute medical emergencies and have clinically decided no further acute medical interventions are required.     Reassessed multiple times and improving.     Patient stable for discharge. All questions answered. Given strict return precautions.  Patient content with plan. The differential diagnosis and treatment modalities were discussed thoroughly with the patient.        Diagnosis:    ICD-10-CM    1. Vaginal bleeding  N93.9       2. Lower abdominal pain  R10.30            Discharge Medications:  New Prescriptions    MEDROXYPROGESTERONE (PROVERA) 10 MG TABLET    Take 1 tablet (10 mg) by mouth daily    SIMETHICONE (MYLICON) 125 MG CHEWABLE TABLET    Take 1 tablet (125 mg) by mouth 2 times daily      Scribe Disclosure:  Bee JACKSON, am serving as a scribe at 10:32 AM on 2/12/2024 to document services personally performed by Atif Wallace MD based on my observations and the provider's statements to me.    2/12/2024   Atif Wallace MD Foss, Kevin, MD  02/12/24 3039

## 2024-02-12 NOTE — DISCHARGE INSTRUCTIONS
Return to the Emergency Department if your symptoms worsen, become concerning, or for any other concerns. If you go through more than 1 pad an hour of vaginal bleeding, get severe abdominal pain, feel lightheaded, feel like you are going to pass out, or shortness of breath, return promptly to the ED.    Follow-up with your obstetrician tomorrow.

## 2024-02-12 NOTE — ED TRIAGE NOTES
Patient presents with complaints of vaginal bleeding, nausea, vomiting and pelvic pain that started this morning.      Triage Assessment (Adult)       Row Name 02/12/24 1008          Triage Assessment    Airway WDL WDL        Respiratory WDL    Respiratory WDL WDL        Skin Circulation/Temperature WDL    Skin Circulation/Temperature WDL WDL        Cardiac WDL    Cardiac WDL WDL        Peripheral/Neurovascular WDL    Peripheral Neurovascular WDL WDL        Cognitive/Neuro/Behavioral WDL    Cognitive/Neuro/Behavioral WDL WDL

## 2024-02-13 ENCOUNTER — HOSPITAL ENCOUNTER (OUTPATIENT)
Facility: CLINIC | Age: 29
Setting detail: OBSERVATION
Discharge: HOME | End: 2024-02-14
Attending: EMERGENCY MEDICINE | Admitting: STUDENT IN AN ORGANIZED HEALTH CARE EDUCATION/TRAINING PROGRAM
Payer: COMMERCIAL

## 2024-02-13 ENCOUNTER — APPOINTMENT (OUTPATIENT)
Dept: CT IMAGING | Facility: CLINIC | Age: 29
End: 2024-02-13
Attending: EMERGENCY MEDICINE
Payer: COMMERCIAL

## 2024-02-13 DIAGNOSIS — N71.0 ACUTE ENDOMETRITIS: ICD-10-CM

## 2024-02-13 DIAGNOSIS — D64.9 ANEMIA, UNSPECIFIED TYPE: ICD-10-CM

## 2024-02-13 LAB
ABO/RH(D): NORMAL
ALBUMIN UR-MCNC: 10 MG/DL
ANTIBODY SCREEN: NEGATIVE
APPEARANCE UR: CLEAR
BASOPHILS # BLD AUTO: 0 10E3/UL (ref 0–0.2)
BASOPHILS NFR BLD AUTO: 0 %
BILIRUB UR QL STRIP: NEGATIVE
COLOR UR AUTO: ABNORMAL
EOSINOPHIL # BLD AUTO: 0 10E3/UL (ref 0–0.7)
EOSINOPHIL NFR BLD AUTO: 0 %
ERYTHROCYTE [DISTWIDTH] IN BLOOD BY AUTOMATED COUNT: 20.9 % (ref 10–15)
GLUCOSE UR STRIP-MCNC: 500 MG/DL
HCT VFR BLD AUTO: 26.9 % (ref 35–47)
HGB BLD-MCNC: 7.9 G/DL (ref 11.7–15.7)
HGB UR QL STRIP: ABNORMAL
HOLD SPECIMEN: NORMAL
HOLD SPECIMEN: NORMAL
IMM GRANULOCYTES # BLD: 0.1 10E3/UL
IMM GRANULOCYTES NFR BLD: 1 %
KETONES UR STRIP-MCNC: 20 MG/DL
LEUKOCYTE ESTERASE UR QL STRIP: NEGATIVE
LYMPHOCYTES # BLD AUTO: 1.2 10E3/UL (ref 0.8–5.3)
LYMPHOCYTES NFR BLD AUTO: 7 %
MCH RBC QN AUTO: 20.5 PG (ref 26.5–33)
MCHC RBC AUTO-ENTMCNC: 29.4 G/DL (ref 31.5–36.5)
MCV RBC AUTO: 70 FL (ref 78–100)
MONOCYTES # BLD AUTO: 1.2 10E3/UL (ref 0–1.3)
MONOCYTES NFR BLD AUTO: 7 %
MUCOUS THREADS #/AREA URNS LPF: PRESENT /LPF
NEUTROPHILS # BLD AUTO: 13.9 10E3/UL (ref 1.6–8.3)
NEUTROPHILS NFR BLD AUTO: 85 %
NITRATE UR QL: NEGATIVE
NRBC # BLD AUTO: 0 10E3/UL
NRBC BLD AUTO-RTO: 0 /100
PH UR STRIP: 6.5 [PH] (ref 5–7)
PLATELET # BLD AUTO: 353 10E3/UL (ref 150–450)
RBC # BLD AUTO: 3.86 10E6/UL (ref 3.8–5.2)
RBC URINE: 146 /HPF
SP GR UR STRIP: 1.01 (ref 1–1.03)
SPECIMEN EXPIRATION DATE: NORMAL
SQUAMOUS EPITHELIAL: <1 /HPF
UROBILINOGEN UR STRIP-MCNC: NORMAL MG/DL
WBC # BLD AUTO: 16.4 10E3/UL (ref 4–11)
WBC URINE: 1 /HPF

## 2024-02-13 PROCEDURE — 250N000011 HC RX IP 250 OP 636: Mod: JZ | Performed by: STUDENT IN AN ORGANIZED HEALTH CARE EDUCATION/TRAINING PROGRAM

## 2024-02-13 PROCEDURE — 258N000003 HC RX IP 258 OP 636: Performed by: STUDENT IN AN ORGANIZED HEALTH CARE EDUCATION/TRAINING PROGRAM

## 2024-02-13 PROCEDURE — 250N000011 HC RX IP 250 OP 636: Performed by: EMERGENCY MEDICINE

## 2024-02-13 PROCEDURE — 250N000013 HC RX MED GY IP 250 OP 250 PS 637: Performed by: STUDENT IN AN ORGANIZED HEALTH CARE EDUCATION/TRAINING PROGRAM

## 2024-02-13 PROCEDURE — 250N000013 HC RX MED GY IP 250 OP 250 PS 637: Performed by: EMERGENCY MEDICINE

## 2024-02-13 PROCEDURE — 258N000003 HC RX IP 258 OP 636: Performed by: EMERGENCY MEDICINE

## 2024-02-13 PROCEDURE — 74177 CT ABD & PELVIS W/CONTRAST: CPT

## 2024-02-13 PROCEDURE — 81001 URINALYSIS AUTO W/SCOPE: CPT | Performed by: EMERGENCY MEDICINE

## 2024-02-13 PROCEDURE — 96374 THER/PROPH/DIAG INJ IV PUSH: CPT | Mod: XU

## 2024-02-13 PROCEDURE — 86900 BLOOD TYPING SEROLOGIC ABO: CPT | Performed by: EMERGENCY MEDICINE

## 2024-02-13 PROCEDURE — 96375 TX/PRO/DX INJ NEW DRUG ADDON: CPT

## 2024-02-13 PROCEDURE — G0378 HOSPITAL OBSERVATION PER HR: HCPCS

## 2024-02-13 PROCEDURE — 96361 HYDRATE IV INFUSION ADD-ON: CPT

## 2024-02-13 PROCEDURE — 36415 COLL VENOUS BLD VENIPUNCTURE: CPT | Performed by: EMERGENCY MEDICINE

## 2024-02-13 PROCEDURE — 250N000009 HC RX 250: Performed by: EMERGENCY MEDICINE

## 2024-02-13 PROCEDURE — 99285 EMERGENCY DEPT VISIT HI MDM: CPT | Mod: 25

## 2024-02-13 PROCEDURE — 85025 COMPLETE CBC W/AUTO DIFF WBC: CPT | Performed by: EMERGENCY MEDICINE

## 2024-02-13 PROCEDURE — 87040 BLOOD CULTURE FOR BACTERIA: CPT | Performed by: EMERGENCY MEDICINE

## 2024-02-13 RX ORDER — FENTANYL CITRATE 50 UG/ML
0.5 INJECTION, SOLUTION INTRAMUSCULAR; INTRAVENOUS
Status: DISCONTINUED | OUTPATIENT
Start: 2024-02-13 | End: 2024-02-14 | Stop reason: HOSPADM

## 2024-02-13 RX ORDER — PANTOPRAZOLE SODIUM 40 MG/1
40 TABLET, DELAYED RELEASE ORAL 2 TIMES DAILY PRN
COMMUNITY
End: 2024-02-22

## 2024-02-13 RX ORDER — IBUPROFEN 200 MG
200 TABLET ORAL EVERY 6 HOURS PRN
Status: DISCONTINUED | OUTPATIENT
Start: 2024-02-13 | End: 2024-02-14 | Stop reason: HOSPADM

## 2024-02-13 RX ORDER — KETOROLAC TROMETHAMINE 15 MG/ML
30 INJECTION, SOLUTION INTRAMUSCULAR; INTRAVENOUS ONCE
Status: COMPLETED | OUTPATIENT
Start: 2024-02-13 | End: 2024-02-13

## 2024-02-13 RX ORDER — METRONIDAZOLE 500 MG/100ML
500 INJECTION, SOLUTION INTRAVENOUS EVERY 12 HOURS
Status: DISCONTINUED | OUTPATIENT
Start: 2024-02-13 | End: 2024-02-14 | Stop reason: HOSPADM

## 2024-02-13 RX ORDER — SODIUM CHLORIDE 9 MG/ML
INJECTION, SOLUTION INTRAVENOUS CONTINUOUS
Status: DISCONTINUED | OUTPATIENT
Start: 2024-02-13 | End: 2024-02-13

## 2024-02-13 RX ORDER — DOXYCYCLINE 100 MG/10ML
100 INJECTION, POWDER, LYOPHILIZED, FOR SOLUTION INTRAVENOUS ONCE
Status: COMPLETED | OUTPATIENT
Start: 2024-02-13 | End: 2024-02-13

## 2024-02-13 RX ORDER — IBUPROFEN 200 MG
200-600 TABLET ORAL EVERY 6 HOURS PRN
Status: ON HOLD | COMMUNITY
End: 2024-02-27

## 2024-02-13 RX ORDER — NALOXONE HYDROCHLORIDE 0.4 MG/ML
0.4 INJECTION, SOLUTION INTRAMUSCULAR; INTRAVENOUS; SUBCUTANEOUS
Status: DISCONTINUED | OUTPATIENT
Start: 2024-02-13 | End: 2024-02-14 | Stop reason: HOSPADM

## 2024-02-13 RX ORDER — CALCIUM CARBONATE 500 MG/1
1 TABLET, CHEWABLE ORAL 2 TIMES DAILY PRN
COMMUNITY
End: 2024-02-22

## 2024-02-13 RX ORDER — MEDROXYPROGESTERONE ACETATE 10 MG
20 TABLET ORAL 3 TIMES DAILY
Status: DISCONTINUED | OUTPATIENT
Start: 2024-02-13 | End: 2024-02-14 | Stop reason: HOSPADM

## 2024-02-13 RX ORDER — DOXYCYCLINE 100 MG/1
100 CAPSULE ORAL EVERY 12 HOURS SCHEDULED
Status: DISCONTINUED | OUTPATIENT
Start: 2024-02-13 | End: 2024-02-14 | Stop reason: HOSPADM

## 2024-02-13 RX ORDER — ACETAMINOPHEN 325 MG/1
650 TABLET ORAL EVERY 4 HOURS PRN
Status: DISCONTINUED | OUTPATIENT
Start: 2024-02-13 | End: 2024-02-14 | Stop reason: HOSPADM

## 2024-02-13 RX ORDER — NALOXONE HYDROCHLORIDE 0.4 MG/ML
0.2 INJECTION, SOLUTION INTRAMUSCULAR; INTRAVENOUS; SUBCUTANEOUS
Status: DISCONTINUED | OUTPATIENT
Start: 2024-02-13 | End: 2024-02-14 | Stop reason: HOSPADM

## 2024-02-13 RX ORDER — FENTANYL CITRATE 50 UG/ML
100 INJECTION, SOLUTION INTRAMUSCULAR; INTRAVENOUS ONCE
Status: COMPLETED | OUTPATIENT
Start: 2024-02-13 | End: 2024-02-13

## 2024-02-13 RX ORDER — IOPAMIDOL 755 MG/ML
70 INJECTION, SOLUTION INTRAVASCULAR ONCE
Status: COMPLETED | OUTPATIENT
Start: 2024-02-13 | End: 2024-02-13

## 2024-02-13 RX ORDER — ONDANSETRON 2 MG/ML
4 INJECTION INTRAMUSCULAR; INTRAVENOUS EVERY 30 MIN PRN
Status: DISCONTINUED | OUTPATIENT
Start: 2024-02-13 | End: 2024-02-14 | Stop reason: HOSPADM

## 2024-02-13 RX ORDER — DOCUSATE SODIUM 100 MG/1
100 CAPSULE, LIQUID FILLED ORAL 2 TIMES DAILY
Status: DISCONTINUED | OUTPATIENT
Start: 2024-02-13 | End: 2024-02-14 | Stop reason: HOSPADM

## 2024-02-13 RX ORDER — MEDROXYPROGESTERONE ACETATE 10 MG
20 TABLET ORAL ONCE
Status: COMPLETED | OUTPATIENT
Start: 2024-02-13 | End: 2024-02-13

## 2024-02-13 RX ORDER — ONDANSETRON 4 MG/1
4 TABLET, ORALLY DISINTEGRATING ORAL EVERY 8 HOURS PRN
Status: ON HOLD | COMMUNITY
End: 2024-02-14

## 2024-02-13 RX ORDER — CEFTRIAXONE 2 G/1
2 INJECTION, POWDER, FOR SOLUTION INTRAMUSCULAR; INTRAVENOUS ONCE
Status: COMPLETED | OUTPATIENT
Start: 2024-02-13 | End: 2024-02-13

## 2024-02-13 RX ADMIN — SODIUM CHLORIDE 60 ML: 9 INJECTION, SOLUTION INTRAVENOUS at 10:42

## 2024-02-13 RX ADMIN — METRONIDAZOLE 500 MG: 500 INJECTION, SOLUTION INTRAVENOUS at 16:05

## 2024-02-13 RX ADMIN — MEDROXYPROGESTERONE ACETATE 20 MG: 10 TABLET ORAL at 21:45

## 2024-02-13 RX ADMIN — KETOROLAC TROMETHAMINE 30 MG: 15 INJECTION, SOLUTION INTRAMUSCULAR; INTRAVENOUS at 09:40

## 2024-02-13 RX ADMIN — FENTANYL CITRATE 50 MCG: 50 INJECTION, SOLUTION INTRAMUSCULAR; INTRAVENOUS at 09:40

## 2024-02-13 RX ADMIN — DOXYCYCLINE HYCLATE 100 MG: 100 CAPSULE ORAL at 21:39

## 2024-02-13 RX ADMIN — DOXYCYCLINE 100 MG: 100 INJECTION, POWDER, LYOPHILIZED, FOR SOLUTION INTRAVENOUS at 13:58

## 2024-02-13 RX ADMIN — DOCUSATE SODIUM 100 MG: 100 CAPSULE, LIQUID FILLED ORAL at 21:39

## 2024-02-13 RX ADMIN — IOPAMIDOL 70 ML: 755 INJECTION, SOLUTION INTRAVENOUS at 10:42

## 2024-02-13 RX ADMIN — CEFTRIAXONE SODIUM 2 G: 2 INJECTION, POWDER, FOR SOLUTION INTRAMUSCULAR; INTRAVENOUS at 12:09

## 2024-02-13 RX ADMIN — SODIUM CHLORIDE: 9 INJECTION, SOLUTION INTRAVENOUS at 09:39

## 2024-02-13 RX ADMIN — SODIUM CHLORIDE: 9 INJECTION, SOLUTION INTRAVENOUS at 12:45

## 2024-02-13 RX ADMIN — MEDROXYPROGESTERONE ACETATE 20 MG: 10 TABLET ORAL at 12:10

## 2024-02-13 RX ADMIN — ONDANSETRON 4 MG: 2 INJECTION INTRAMUSCULAR; INTRAVENOUS at 09:40

## 2024-02-13 ASSESSMENT — ACTIVITIES OF DAILY LIVING (ADL)
ADLS_ACUITY_SCORE: 36
ADLS_ACUITY_SCORE: 29
ADLS_ACUITY_SCORE: 35
ADLS_ACUITY_SCORE: 29
ADLS_ACUITY_SCORE: 29

## 2024-02-13 NOTE — PHARMACY-ADMISSION MEDICATION HISTORY
Pharmacist Admission Medication History    Admission medication history is complete. The information provided in this note is only as accurate as the sources available at the time of the update.    Information Source(s): Patient, Family member, Clinic records, and CareEverywhere/SureScripts via in-person    Pertinent Information: None    Changes made to PTA medication list:  Added:   Pantoprazole, Diclofenac/tylenol, zofran, TUMS  Deleted:   Dicyclomine, Norethidrone  Changed:   Ibuprofen 800mg q6h prn --> 200-600mg q6h prn    Allergies reviewed with patient and updates made in EHR: unable to assess    Medication History Completed By: Yarelis Fitzpatrick [facility] 2/13/2024 11:51 AM    PTA Med List   Medication Sig Last Dose    calcium carbonate (TUMS) 500 MG chewable tablet Take 1 chew tab by mouth 2 times daily as needed for heartburn prn at prn    ibuprofen (ADVIL/MOTRIN) 200 MG tablet Take 200-600 mg by mouth every 6 hours as needed for pain prn at prn    medroxyPROGESTERone (PROVERA) 10 MG tablet Take 1 tablet (10 mg) by mouth daily 2/12/2024    ondansetron (ZOFRAN ODT) 4 MG ODT tab Take 4 mg by mouth every 8 hours as needed for nausea prn at prn    pantoprazole (PROTONIX) 40 MG EC tablet Take 40 mg by mouth 2 times daily as needed for heartburn 2/13/2024 at am    simethicone (MYLICON) 125 MG chewable tablet Take 1 tablet (125 mg) by mouth 2 times daily (Patient taking differently: Take 125 mg by mouth 2 times daily as needed) prn at prn    UNABLE TO FIND Take 1 tablet by mouth 2 times daily as needed MEDICATION NAME: diclofenac-tylenol 50mg-325mg prn at prn

## 2024-02-13 NOTE — PROGRESS NOTES
PRIMARY DIAGNOSIS: ACUTE PAIN  OUTPATIENT/OBSERVATION GOALS TO BE MET BEFORE DISCHARGE:  1. Pain Status: Pain free.    2. Return to near baseline physical activity: No    3. Cleared for discharge by consultants (if involved): No    Discharge Planner Nurse   Safe discharge environment identified: Yes  Barriers to discharge: Yes       Entered by: Elida Navarro RN 02/13/2024 2:05 PM     Please review provider order for any additional goals.   Nurse to notify provider when observation goals have been met and patient is ready for discharge.

## 2024-02-13 NOTE — PLAN OF CARE
Goal Outcome Evaluation:      Plan of Care Reviewed With: patient    PRIMARY Concern: Pt here from ED today with abdominal  pain, rectal pain and vag bled.   SAFETY RISK Concerns (fall risk, behaviors, etc.): fall risk       Isolation/Type: none   Tests/Procedures for NEXT shift: labs in am. OBGYN following   Consults? (Pending/following, signed-off?) OBGYN  Where is patient from? (Home, TCU, etc.): home   Other Important info for NEXT shift: vag bleed present. Weak.  Hgb 7.9 today. Cervix swab done by MD at the bedside   Anticipated DC date & active delays: 1-2 days    SUMMARY NOTE:  Orientation/Cognitive: A/o X4   Observation Goals (Met/ Not Met): not met   Mobility Level/Assist Equipment: up SBA  Antibiotics & Plan (IV/po, length of tx left): on IV flagyl and IV doxy  Pain Management: controlled at this time. Received fentanyl in ER   Tele/VS/O2: VSS except HR tachy in 100s. RA   ABNL Lab/BG: hgb 7.9 monitor   Diet: regular   Bowel/Bladder: continence   Skin Concerns: none   Drains/Devices: PIV  Patient Stated Goal for Today: pain control and rest

## 2024-02-13 NOTE — PROGRESS NOTES
PRIMARY DIAGNOSIS: ACUTE PAIN  OUTPATIENT/OBSERVATION GOALS TO BE MET BEFORE DISCHARGE:  1. Pain Status: Pain free.    2. Return to near baseline physical activity: No    3. Cleared for discharge by consultants (if involved): No    Discharge Planner Nurse   Safe discharge environment identified: Yes  Barriers to discharge: Yes       Entered by: Elida Navarro RN 02/13/2024 4:01 PM     Please review provider order for any additional goals.   Nurse to notify provider when observation goals have been met and patient is ready for discharge.

## 2024-02-13 NOTE — ED TRIAGE NOTES
Patient seen here yesterday for same issue. Patient has appt at [facility] at 10:40 this morning but states that she is in too much pain to wait that long.

## 2024-02-13 NOTE — ED PROVIDER NOTES
History     Chief Complaint:  Abdominal Pain, Rectal/perineal Pain, and Vaginal Bleeding     The history is provided by the patient.      Danny Villareal is a 28 year old female with a history of endometriosis and fibroid uterus who presents to the emergency department for abdominal pain, rectal/perineal pain, and vaginal bleeding. The patient states that she was seen yesterday for similar symptoms. She reports that she has been experiencing lower abdominal and rectum for the past three days, accompanied by vaginal bleeding with some small clots. She adds that she is also experiencing gas pain. She notes that she has a hx of uterine fibroid. Denies fever. Denies taking pain medications. She notes she still has her appendix.    Independent Historian:   None - Patient Only    Review of External Notes:   I reviewed the patient's emergency department visit from yesterday.     Medications:    dicyclomine (BENTYL) 10 MG capsule  medroxyPROGESTERone (PROVERA) 10 MG tablet  norethindrone (AYGESTIN) 5 MG tablet  simethicone (MYLICON) 125 MG chewable tablet    Past Medical History:    Anemia  Endometriosis  Fibroid uterus    Physical Exam   Patient Vitals for the past 24 hrs:   BP Temp Temp src Pulse Resp SpO2 Weight   02/13/24 1244 119/82 98  F (36.7  C) Oral 108 20 100 % 63.1 kg (139 lb 1.8 oz)   02/13/24 1230 127/83 -- -- 108 -- -- --   02/13/24 1030 -- -- -- -- -- 100 % --   02/13/24 0915 131/89 99.8  F (37.7  C) Temporal 106 -- 100 % --   02/13/24 0914 -- -- -- -- 21 100 % --   02/13/24 0913 -- -- -- -- -- 100 % --   02/13/24 0912 131/89 -- -- 106 -- -- --      Physical Exam  Nursing note and vitals reviewed.    Constitutional:  Very uncomfortable appearing, writhing in pain.  HENT:                Nose normal.  No discharge.      Oral mucosa is moist.  Eyes:    Conjunctivae are normal without injection.  Pupils are equal.  Cardiovascular:  Mildly tachycardic, regular rhythm with normal S1 and S2.      Normal heart  sounds and peripheral pulses 2+ and equal.    Pulmonary:  Effort normal and breath sounds clear to auscultation bilaterally.    No respiratory distress.     GI:    Diffuse lower abdominal pain with guarding.  No obvious flank tenderness.  Musculoskeletal:  Normal range of motion. No extremity deformity.     No edema and no tenderness.    Neurological:   Alert and oriented. No focal weakness.  Skin:    Skin is warm and dry. No rash noted.   Psychiatric:   Behavior is normal. Appropriate mood and affect.     Judgment and thought content normal.     Emergency Department Course     Imaging:  CT Abdomen Pelvis w Contrast   Final Result   IMPRESSION:    1.  Enlarged uterus with indeterminate multiple subcentimeter cystic   spaces in the fundus. No discrete fibroid is visualized. This may be   due to presence of a focal adenomyoma. Superimposed myometritis is not   excluded. A malignant process is not entirely excluded, although less   likely given the young age. Gynecologic consultation is recommended.   Pelvic MRI can be considered for further imaging evaluation if   clinically indicated.   2.  Small amount of hyperdense fluid in the pelvis and bilateral   paracolic gutters could either represent a small hemoperitoneum,   infected ascites/peritonitis, or malignant ascites depending on #1.   3.  No adnexal masses to suggest ruptured hemorrhagic ovarian cysts.   4.  Mild retroperitoneal lymphadenopathy is indeterminate, may be   reactive or metastatic depending on the workup of #1.      MARIPOSA ANNA MD            SYSTEM ID:  [id number]             Laboratory:  Labs Ordered and Resulted from Time of ED Arrival to Time of ED Departure   ROUTINE UA WITH MICROSCOPIC REFLEX TO CULTURE - Abnormal       Result Value    Color Urine Light Yellow      Appearance Urine Clear      Glucose Urine 500 (*)     Bilirubin Urine Negative      Ketones Urine 20 (*)     Specific Gravity Urine 1.010      Blood Urine Moderate (*)     pH Urine 6.5       Protein Albumin Urine 10 (*)     Urobilinogen Urine Normal      Nitrite Urine Negative      Leukocyte Esterase Urine Negative      Mucus Urine Present (*)     RBC Urine 146 (*)     WBC Urine 1      Squamous Epithelials Urine <1     CBC WITH PLATELETS AND DIFFERENTIAL - Abnormal    WBC Count 16.4 (*)     RBC Count 3.86      Hemoglobin 7.9 (*)     Hematocrit 26.9 (*)     MCV 70 (*)     MCH 20.5 (*)     MCHC 29.4 (*)     RDW 20.9 (*)     Platelet Count 353      % Neutrophils 85      % Lymphocytes 7      % Monocytes 7      % Eosinophils 0      % Basophils 0      % Immature Granulocytes 1      NRBCs per 100 WBC 0      Absolute Neutrophils 13.9 (*)     Absolute Lymphocytes 1.2      Absolute Monocytes 1.2      Absolute Eosinophils 0.0      Absolute Basophils 0.0      Absolute Immature Granulocytes 0.1      Absolute NRBCs 0.0     TYPE AND SCREEN, ADULT    ABO/RH(D) B POS      Antibody Screen Negative      SPECIMEN EXPIRATION DATE [id number]     PREPARE RED BLOOD CELLS (UNIT)   RED BLOOD CELLS HAVE AVAILABLE (UNIT)   BLOOD CULTURE   BLOOD CULTURE   ABO/RH TYPE AND SCREEN        Procedures   none    Emergency Department Course & Assessments    Interventions:  Medications   ondansetron (ZOFRAN) injection 4 mg (4 mg Intravenous $Given 2/13/24 0940)   sodium chloride 0.9 % infusion (0 mLs Intravenous Stopped 2/13/24 1157)   doxycycline (VIBRAMYCIN) 100 mg vial to attach to  mL bag (has no administration in time range)   sodium chloride 0.9 % infusion ( Intravenous $New Bag 2/13/24 1245)   fentaNYL (PF) (SUBLIMAZE) injection 31.5 mcg (has no administration in time range)   acetaminophen (TYLENOL) tablet 650 mg (has no administration in time range)   metroNIDAZOLE (FLAGYL) infusion 500 mg (has no administration in time range)   ketorolac (TORADOL) injection 30 mg (30 mg Intravenous $Given 2/13/24 0940)   fentaNYL (PF) (SUBLIMAZE) injection 100 mcg (50 mcg Intravenous $Given 2/13/24 0940)   iopamidol (ISOVUE-370)  solution 70 mL (70 mLs Intravenous $Given 2/13/24 1042)   Saline (60 mLs Intravenous $Given 2/13/24 1042)   medroxyPROGESTERone (PROVERA) tablet 20 mg (20 mg Oral $Given 2/13/24 1210)   cefTRIAXone (ROCEPHIN) 2 g vial to attach to  ml bag for ADULTS or NS 50 ml bag for PEDS (2 g Intravenous $New Bag 2/13/24 1209)      Assessments:  0921 I obtained history and examined the patient as noted above.     Independent Interpretation (X-rays, CTs, rhythm strip):  None    Consultations/Discussion of Management or Tests:  I discussed the case with Dr. Manning who is here seeing the patient.    Social Determinants of Health affecting care:   None    Disposition:  The patient was admitted to the hospital under the care of Dr. Manning.     Impression & Plan      Medical Decision Making:  Patient comes in with severe pelvic and lower abdominal pain, writhing on the bed.  An IV is in place, Toradol and fentanyl are ordered and Zofran.  Labs are obtained and she is sent down for a CT scan.  Her white count came back higher at 16.4 and her hemoglobin is dropped almost a gram at 7.9.  I did not repeat her comprehensive panel as that was normal yesterday and pregnancy test negative.  CT scan showed an abnormal uterus with a lot of unusual things in the uterus possibly infectious.  They did not see an obvious fibroma.  There was some free fluid in the pelvis as well probably blood.  I talked with Dr. Manning and she is down here now seeing the patient and may end up taking her to the operating room later.  She agreed with starting antibiotics and she did cultures when she did a pelvic exam.  I have ordered Rocephin 2 g IV and doxycycline 100 mg IV per her recommendation.  Will need to monitor her hemoglobins.  I did type and cross her for 2 units of blood to be held.  Patient is feeling much better after the Zofran and fentanyl and Toradol.    Diagnosis:    ICD-10-CM    1. Acute endometritis  N71.0       2. Anemia, unspecified  type  D64.9            Discharge Medications:  Current Discharge Medication List         Scribe Disclosure:  I, Salvador Buck, am serving as a scribe at 9:19 AM on 2/13/2024 to document services personally performed by Raysa Root MD based on my observations and the provider's statements to me.     2/13/2024   Raysa Root MD Powell, Tracy Alan, MD  02/13/24 130

## 2024-02-13 NOTE — PROGRESS NOTES
RECEIVING UNIT ED HANDOFF REVIEW    ED Nurse Handoff Report was reviewed by: Elida Navarro RN on February 13, 2024 at 12:23 PM

## 2024-02-13 NOTE — ED NOTES
[facility]  ED Nurse Handoff Report    ED Chief complaint: Abdominal Pain, Rectal/perineal Pain, and Vaginal Bleeding      ED Diagnosis:   Final diagnoses:   Acute endometritis   Anemia, unspecified type       Code Status:     Allergies: No Known Allergies    Patient Story:   Patient seen here yesterday for same issue. [facility] this morning but states that she is in too much pain to wait that long.          Focused Assessment:    Neuro: Alert, oriented x 4  Respiratory:Clear lung sounds, on room air   Cardiology:  pivx2   Gastrointestinal: soft, non tender, non distended   Genitourinary/Renal:    Musculoskeletal: moves all extremities   Skin: Intact skin   Lines: pivx2, c/o discomfort with iv's    Labs Ordered and Resulted from Time of ED Arrival to Time of ED Departure   ROUTINE UA WITH MICROSCOPIC REFLEX TO CULTURE - Abnormal       Result Value    Color Urine Light Yellow      Appearance Urine Clear      Glucose Urine 500 (*)     Bilirubin Urine Negative      Ketones Urine 20 (*)     Specific Gravity Urine 1.010      Blood Urine Moderate (*)     pH Urine 6.5      Protein Albumin Urine 10 (*)     Urobilinogen Urine Normal      Nitrite Urine Negative      Leukocyte Esterase Urine Negative      Mucus Urine Present (*)     RBC Urine 146 (*)     WBC Urine 1      Squamous Epithelials Urine <1     CBC WITH PLATELETS AND DIFFERENTIAL - Abnormal    WBC Count 16.4 (*)     RBC Count 3.86      Hemoglobin 7.9 (*)     Hematocrit 26.9 (*)     MCV 70 (*)     MCH 20.5 (*)     MCHC 29.4 (*)     RDW 20.9 (*)     Platelet Count 353      % Neutrophils 85      % Lymphocytes 7      % Monocytes 7      % Eosinophils 0      % Basophils 0      % Immature Granulocytes 1      NRBCs per 100 WBC 0      Absolute Neutrophils 13.9 (*)     Absolute Lymphocytes 1.2      Absolute Monocytes 1.2      Absolute Eosinophils 0.0      Absolute Basophils 0.0      Absolute Immature Granulocytes 0.1       Absolute NRBCs 0.0     TYPE AND SCREEN, ADULT    ABO/RH(D) B POS      SPECIMEN EXPIRATION DATE [id number]     PREPARE RED BLOOD CELLS (UNIT)   RED BLOOD CELLS HAVE AVAILABLE (UNIT)   BLOOD CULTURE   BLOOD CULTURE   ABO/RH TYPE AND SCREEN        CT Abdomen Pelvis w Contrast   Final Result   IMPRESSION:    1.  Enlarged uterus with indeterminate multiple subcentimeter cystic   spaces in the fundus. No discrete fibroid is visualized. This may be   due to presence of a focal adenomyoma. Superimposed myometritis is not   excluded. A malignant process is not entirely excluded, although less   likely given the young age. Gynecologic consultation is recommended.   Pelvic MRI can be considered for further imaging evaluation if   clinically indicated.   2.  Small amount of hyperdense fluid in the pelvis and bilateral   paracolic gutters could either represent a small hemoperitoneum,   infected ascites/peritonitis, or malignant ascites depending on #1.   3.  No adnexal masses to suggest ruptured hemorrhagic ovarian cysts.   4.  Mild retroperitoneal lymphadenopathy is indeterminate, may be   reactive or metastatic depending on the workup of #1.      MARIPOSA ANNA MD            SYSTEM ID:  [id number]            Treatments and/or interventions provided:      Medications   ondansetron (ZOFRAN) injection 4 mg (4 mg Intravenous $Given 2/13/24 0940)   sodium chloride 0.9 % infusion (0 mLs Intravenous Stopped 2/13/24 1157)   medroxyPROGESTERone (PROVERA) tablet 20 mg (has no administration in time range)   cefTRIAXone (ROCEPHIN) 2 g vial to attach to  ml bag for ADULTS or NS 50 ml bag for PEDS (has no administration in time range)   doxycycline (VIBRAMYCIN) 100 mg vial to attach to  mL bag (has no administration in time range)   ketorolac (TORADOL) injection 30 mg (30 mg Intravenous $Given 2/13/24 0940)   fentaNYL (PF) (SUBLIMAZE) injection 100 mcg (50 mcg Intravenous $Given 2/13/24 0940)   iopamidol (ISOVUE-370)  solution 70 mL (70 mLs Intravenous $Given 2/13/24 1042)   Saline (60 mLs Intravenous $Given 2/13/24 1042)        Patient's response to treatments and/or interventions:   Resting comfortably    To be done/followed up on inpatient unit:    See any in-patient orders    Does this patient have any cognitive concerns?:  no    Activity level - Baseline/Home:    Independent    Activity Level - Current:     Independent    Patient's Preferred language: English     Needed?: No    Isolation: None  Infection: Not Applicable  Patient tested for COVID 19 prior to admission: NO    Bariatric?: No    Vital Signs:   Vitals:    02/13/24 0912 02/13/24 0913 02/13/24 0914 02/13/24 0915   BP: 131/89      Pulse: 106      Resp:   21    Temp:    99.8  F (37.7  C)   TempSrc:    Temporal   SpO2:  100% 100%        Cardiac Rhythm:     Was the PSS-3 completed:   Yes  What interventions are required if any?               Family Comments: at bedside  OBS brochure/video discussed/provided to patient/family: No                For the majority of the shift this patient's behavior was Green.   Behavioral interventions performed were     ED NURSE PHONE NUMBER: [phone]      Patient seen here yesterday for same issue. [facility] at [facility] this morning but states that she is in too much pain to wait that long.

## 2024-02-13 NOTE — H&P
"HISTORY AND PHYSICAL    Patient Name: Danny Villareal   Admit Date: 213  MR #: [id number]   Acct #: [id number]   : 1995     Chief Complaint/Reason for Visit: abdominal pain    History of Present Illness: Danny Villareal is a 27yo G0 F who presented to the ED today by my recommendation due to severe abdominal pain associated with nausea/vomiting in the setting of menses. Danny had an episode of very heavy vaginal bleeding in January, presented to the ED due to heavy bleeding and cramping pain, was ultimately discharged. Outpatient TVUS showed multiple endometrial polyps, she had a hysteroscopic polypectomy on Marv 15 that was uncomplicated, the endometrium had many subcentimeter polyps, all removed and pathology was benign. Endometrial cavity otherwise unremarkable. Since surgery she has had intermittent spotting, no pain. On Saturday switched from aygestin 5mg daily to loestrin. On Saturday she noticed increased vaginal bleeding with clots (similar to episode in January).  she felt severe abdominal pain that was so bad she had nausea and vomiting, continued heavy vaginal bleeding. Yesterday continued to have abdominal and lower pelvic pain. Noting a constant rectal pressure and pain worse on the left side. She presented to the ED yesterday for evaluation to rule out non-GYN etiology given nausea/vomiting symptoms. Her TAUS showed thickened endometrium and posterior uterine fibroid vs adenomyosis. She had improvement in pain and bleeding so was discharged from the ED with scheduled follow up in outpatient GYN clinic. However this morning had recurrence of severe abdominal pain and rectal pain with more liquid bleeding instead of the clots she had been having. She also has abdominal bloating and \"gas pains\". Reports daily bowel movements, \"sometimes has to strain and will have vaginal bleeding afterwards\". Since arrival to the ED, her bleeding has been minimal. Her pain improved with the IV " "fentanyl.     Pt reports in May 2022 she had a very similar scenario when in [place]. She was admitted to the hospital for antibiotics, they determined her pain was not associated with her uterus. She describes a \"test where they put dye in my rectum and did a scan that showed and infection\". She does not recall the name (doesn't recognize \"diverticulitis\", \"colitis\" or \"C.Diff\"). While in [place] she was also told she has a fibroid and possibly endometriosis.    Review of Systems:  General: denies fevers  CV: denies CP  Pulm: denies SOB  Neuro: denies HA  Gyn: denies vaginal dischargedysuria, dyspareunia  Skin: denies rashes  MSK: denies calf pain  Psych: denies depression     History:   OB History   No obstetric history on file.       Past Medical History:   Diagnosis Date     Anemia      Endometriosis      Fibroid uterus        Past Surgical History:   Procedure Laterality Date     OPERATIVE HYSTEROSCOPY WITH MORCELLATOR N/A 1/15/2024    Procedure: hysteroscopy with myosure polypectomy;  Surgeon: Flora Forbes MD;  Location:  OR       No family history on file.    Social History     Socioeconomic History     Marital status: Single     Spouse name: Not on file     Number of children: Not on file     Years of education: Not on file     Highest education level: Not on file   Occupational History     Not on file   Tobacco Use     Smoking status: Never     Smokeless tobacco: Never   Substance and Sexual Activity     Alcohol use: Yes     Drug use: Never     Sexual activity: Not on file   Other Topics Concern     Not on file   Social History Narrative     Not on file     Social Determinants of Health     Financial Resource Strain: Not on file   Food Insecurity: Not on file   Transportation Needs: Not on file   Physical Activity: Not on file   Stress: Not on file   Social Connections: Not on file   Interpersonal Safety: Not on file   Housing Stability: Not on file       Allergy Information:        I have reviewed the " patient's allergies.      @    Home Medications:   No current outpatient medications on file.       Physical Examination:  Vitals:  Temp:  [98  F (36.7  C)-99.8  F (37.7  C)] 98.1  F (36.7  C)  Pulse:  [106-108] 106  Resp:  [18-21] 18  BP: (119-131)/(75-89) 125/75  SpO2:  [100 %] 100 %    Exam:  General: no acute distress  Head: normocephalic, atraumatic  Eyes: EOMI  Neck: no thyromegaly  CV: RRR, pulses intact  Pulm: CTA-B, no increased effort  Breast: deferred  Neuro: CN II-XII grossly intact  Abd: soft, nondistended, mild tenderness to deep palpation of lower abdomen worse on the left side. No rebound or guarding.  Gyn: External anatomy - normal without lesions or masses; Spec - normal appearing cervix, midline, closed, no discharge, small amout of blood in vault; Bimanual - mobile uterus, no CMT, no adnexal fullness or tenderness  Skin: no rashes  MSK: no calf tenderness  Psych: AOx3, appropriate mood/affect    Laboratory and Additional Data Reviewed:  Any associated labs, path, imaging personally reviewed  Results for orders placed or performed during the hospital encounter of 02/13/24   CT Abdomen Pelvis w Contrast     Status: None    Narrative    CT ABDOMEN PELVIS W CONTRAST 2/13/2024 10:49 AM    CLINICAL HISTORY: Lower abdominal pain along with some pain in the  rectum and vaginal area.    TECHNIQUE: CT scan of the abdomen and pelvis was performed following  injection of IV contrast. Multiplanar reformats were obtained. Dose  reduction techniques were used.  CONTRAST: 70mL Isovue-370    COMPARISON: Ultrasound today and on 5/24/2023    FINDINGS:   LOWER CHEST: Normal.    HEPATOBILIARY: Normal.    PANCREAS: Normal.    SPLEEN: Normal.    ADRENAL GLANDS: Normal.    KIDNEYS/BLADDER: Normal kidneys. Empty urinary bladder.    BOWEL: No small bowel or colonic obstruction or inflammatory changes.  Moderate amount of formed stool in the colon. Normal appendix.    PELVIC ORGANS: The uterus is enlarged measuring 12.1 x  8.9 x 9.0 cm.  There are multiple subcentimeter cystic spaces posteriorly at the  uterine fundus in the location of the suspected fibroid seen on prior  ultrasounds. There is no discrete or defined fibroids seen by CT. No  masses in bilateral adnexa. There is hazy fat stranding around the  uterus. Small amount of hyperdense fluid in the pelvis and bilateral  paracolic gutters could represent a small hemoperitoneum or infected  fluid..    ADDITIONAL FINDINGS: Mild retroperitoneal lymphadenopathy. For  example, there is a 1 cm aortocaval lymph node (series 4, image 103).  No abscess or free air.    MUSCULOSKELETAL: Unremarkable.      Impression    IMPRESSION:   1.  Enlarged uterus with indeterminate multiple subcentimeter cystic  spaces in the fundus. No discrete fibroid is visualized. This may be  due to presence of a focal adenomyoma. Superimposed myometritis is not  excluded. A malignant process is not entirely excluded, although less  likely given the young age. Gynecologic consultation is recommended.  Pelvic MRI can be considered for further imaging evaluation if  clinically indicated.  2.  Small amount of hyperdense fluid in the pelvis and bilateral  paracolic gutters could either represent a small hemoperitoneum,  infected ascites/peritonitis, or malignant ascites depending on #1.  3.  No adnexal masses to suggest ruptured hemorrhagic ovarian cysts.  4.  Mild retroperitoneal lymphadenopathy is indeterminate, may be  reactive or metastatic depending on the workup of #1.    MARIPOSA ANNA MD         SYSTEM ID:  [id number]   UA with Microscopic reflex to Culture     Status: Abnormal    Specimen: Urine, Clean Catch   Result Value Ref Range    Color Urine Light Yellow Colorless, Straw, Light Yellow, Yellow    Appearance Urine Clear Clear    Glucose Urine 500 (A) Negative mg/dL    Bilirubin Urine Negative Negative    Ketones Urine 20 (A) Negative mg/dL    Specific Gravity Urine 1.010 1.003 - 1.035    Blood Urine  Moderate (A) Negative    pH Urine 6.5 5.0 - 7.0    Protein Albumin Urine 10 (A) Negative mg/dL    Urobilinogen Urine Normal Normal, 2.0 mg/dL    Nitrite Urine Negative Negative    Leukocyte Esterase Urine Negative Negative    Mucus Urine Present (A) None Seen /LPF    RBC Urine 146 (H) <=2 /HPF    WBC Urine 1 <=5 /HPF    Squamous Epithelials Urine <1 <=1 /HPF    Narrative    Urine Culture not indicated   CBC with platelets and differential     Status: Abnormal   Result Value Ref Range    WBC Count 16.4 (H) 4.0 - 11.0 10e3/uL    RBC Count 3.86 3.80 - 5.20 10e6/uL    Hemoglobin 7.9 (L) 11.7 - 15.7 g/dL    Hematocrit 26.9 (L) 35.0 - 47.0 %    MCV 70 (L) 78 - 100 fL    MCH 20.5 (L) 26.5 - 33.0 pg    MCHC 29.4 (L) 31.5 - 36.5 g/dL    RDW 20.9 (H) 10.0 - 15.0 %    Platelet Count 353 150 - 450 10e3/uL    % Neutrophils 85 %    % Lymphocytes 7 %    % Monocytes 7 %    % Eosinophils 0 %    % Basophils 0 %    % Immature Granulocytes 1 %    NRBCs per 100 WBC 0 <1 /100    Absolute Neutrophils 13.9 (H) 1.6 - 8.3 10e3/uL    Absolute Lymphocytes 1.2 0.8 - 5.3 10e3/uL    Absolute Monocytes 1.2 0.0 - 1.3 10e3/uL    Absolute Eosinophils 0.0 0.0 - 0.7 10e3/uL    Absolute Basophils 0.0 0.0 - 0.2 10e3/uL    Absolute Immature Granulocytes 0.1 <=0.4 10e3/uL    Absolute NRBCs 0.0 10e3/uL   [facility] Draw     Status: None    Narrative    The following orders were created for panel order [facility] Draw.  Procedure                               Abnormality         Status                     ---------                               -----------         ------                     Extra Green Top (Lithium...[[id number]]                      Final result               Extra Blood Bank Purple ...[[id number]]                      Final result                 Please view results for these tests on the individual orders.   Extra Green Top (Lithium Heparin) Tube     Status: None   Result Value Ref Range    Hold Specimen [facility]    Extra Blood Bank Purple Top Tube      Status: None   Result Value Ref Range    Hold Specimen [facility]    Adult Type and Screen     Status: None   Result Value Ref Range    ABO/RH(D) B POS     Antibody Screen Negative Negative    SPECIMEN EXPIRATION DATE [id number]    CBC with platelets differential     Status: Abnormal    Narrative    The following orders were created for panel order CBC with platelets differential.  Procedure                               Abnormality         Status                     ---------                               -----------         ------                     CBC with platelets and d...[[id number]]  Abnormal            Final result                 Please view results for these tests on the individual orders.   ABO/Rh type and screen     Status: None    Narrative    The following orders were created for panel order ABO/Rh type and screen.  Procedure                               Abnormality         Status                     ---------                               -----------         ------                     Adult Type and Screen[[id number]]                            Final result                 Please view results for these tests on the individual orders.         Assessment and Plan: Danny Villareal is a 28 year old G0 F admitted for observation due to severe abdominal pain and leukocytosis of unknown origin    Abdominal pain  - associated with worsened leukocytosis since yesterday  - remains afebrile  - CT: enlarged uterus possible adenomyosis vs endometritis. Normal adnexa. Hazy fat stranding around the uterus, small amount of fluid in pelvis could represent small hemoperitoneum or infected fluid.  - source of pain/elevated WBC: endometritis vs endometriosis vs diverticulitis vs colitis  - treatment for endometritis with GI coverage initiated: s/p rocephin x1, doxycycline 100mg BID x 14 days, flagyl 500mg BID IV, will transition to PO if improved pain and WBC tomorrow  - Provera 20mg TID x 7 days ordered for vaginal  bleeding control  - repeat CBC in the morning  - if clinical symptoms improving    Anemia  - acute on chronic blood loss anemia  - Provera ordered to control vaginal bleeding  - pt currently asymptomatic  - likely discharge on oral iron, held currently due to some GI sx with current admission    Dispo: inpatient observation and management of abdominal pain and leukocytosis    Flora Forbes MD  2/13/2024, 6:57 PM

## 2024-02-14 VITALS
SYSTOLIC BLOOD PRESSURE: 124 MMHG | OXYGEN SATURATION: 100 % | DIASTOLIC BLOOD PRESSURE: 76 MMHG | RESPIRATION RATE: 16 BRPM | TEMPERATURE: 98.6 F | HEART RATE: 102 BPM | BODY MASS INDEX: 22.45 KG/M2 | WEIGHT: 139.11 LBS

## 2024-02-14 LAB
BASOPHILS # BLD AUTO: 0 10E3/UL (ref 0–0.2)
BASOPHILS NFR BLD AUTO: 0 %
EOSINOPHIL # BLD AUTO: 0.1 10E3/UL (ref 0–0.7)
EOSINOPHIL NFR BLD AUTO: 1 %
ERYTHROCYTE [DISTWIDTH] IN BLOOD BY AUTOMATED COUNT: 20.6 % (ref 10–15)
HCT VFR BLD AUTO: 25.1 % (ref 35–47)
HGB BLD-MCNC: 7.3 G/DL (ref 11.7–15.7)
IMM GRANULOCYTES # BLD: 0.1 10E3/UL
IMM GRANULOCYTES NFR BLD: 1 %
LYMPHOCYTES # BLD AUTO: 2.7 10E3/UL (ref 0.8–5.3)
LYMPHOCYTES NFR BLD AUTO: 24 %
MCH RBC QN AUTO: 20.1 PG (ref 26.5–33)
MCHC RBC AUTO-ENTMCNC: 29.1 G/DL (ref 31.5–36.5)
MCV RBC AUTO: 69 FL (ref 78–100)
MONOCYTES # BLD AUTO: 1.1 10E3/UL (ref 0–1.3)
MONOCYTES NFR BLD AUTO: 10 %
NEUTROPHILS # BLD AUTO: 7.1 10E3/UL (ref 1.6–8.3)
NEUTROPHILS NFR BLD AUTO: 64 %
NRBC # BLD AUTO: 0 10E3/UL
NRBC BLD AUTO-RTO: 0 /100
PLATELET # BLD AUTO: 362 10E3/UL (ref 150–450)
RBC # BLD AUTO: 3.63 10E6/UL (ref 3.8–5.2)
WBC # BLD AUTO: 11.1 10E3/UL (ref 4–11)

## 2024-02-14 PROCEDURE — 250N000013 HC RX MED GY IP 250 OP 250 PS 637: Performed by: STUDENT IN AN ORGANIZED HEALTH CARE EDUCATION/TRAINING PROGRAM

## 2024-02-14 PROCEDURE — G0378 HOSPITAL OBSERVATION PER HR: HCPCS

## 2024-02-14 PROCEDURE — 250N000011 HC RX IP 250 OP 636: Mod: JZ | Performed by: STUDENT IN AN ORGANIZED HEALTH CARE EDUCATION/TRAINING PROGRAM

## 2024-02-14 PROCEDURE — 96376 TX/PRO/DX INJ SAME DRUG ADON: CPT

## 2024-02-14 PROCEDURE — 85025 COMPLETE CBC W/AUTO DIFF WBC: CPT | Performed by: STUDENT IN AN ORGANIZED HEALTH CARE EDUCATION/TRAINING PROGRAM

## 2024-02-14 PROCEDURE — 36415 COLL VENOUS BLD VENIPUNCTURE: CPT | Performed by: STUDENT IN AN ORGANIZED HEALTH CARE EDUCATION/TRAINING PROGRAM

## 2024-02-14 PROCEDURE — 250N000013 HC RX MED GY IP 250 OP 250 PS 637: Performed by: OBSTETRICS & GYNECOLOGY

## 2024-02-14 RX ORDER — DOXYCYCLINE 100 MG/1
100 CAPSULE ORAL EVERY 12 HOURS
Qty: 28 CAPSULE | Refills: 0 | Status: ON HOLD | OUTPATIENT
Start: 2024-02-14 | End: 2024-02-27

## 2024-02-14 RX ORDER — METRONIDAZOLE 500 MG/1
500 TABLET ORAL 2 TIMES DAILY
Qty: 28 TABLET | Refills: 0 | Status: ON HOLD | OUTPATIENT
Start: 2024-02-14 | End: 2024-02-27

## 2024-02-14 RX ORDER — OXYCODONE HYDROCHLORIDE 5 MG/1
5 TABLET ORAL EVERY 6 HOURS PRN
Qty: 12 TABLET | Refills: 0 | Status: SHIPPED | OUTPATIENT
Start: 2024-02-14 | End: 2024-02-17

## 2024-02-14 RX ORDER — MEDROXYPROGESTERONE ACETATE 10 MG
20 TABLET ORAL 3 TIMES DAILY
Qty: 30 TABLET | Refills: 0 | Status: ON HOLD | OUTPATIENT
Start: 2024-02-14 | End: 2024-02-27

## 2024-02-14 RX ORDER — ONDANSETRON 4 MG/1
4 TABLET, FILM COATED ORAL EVERY 8 HOURS PRN
Status: DISCONTINUED | OUTPATIENT
Start: 2024-02-14 | End: 2024-02-14 | Stop reason: HOSPADM

## 2024-02-14 RX ORDER — FAMOTIDINE 10 MG
10 TABLET ORAL 2 TIMES DAILY
Status: DISCONTINUED | OUTPATIENT
Start: 2024-02-14 | End: 2024-02-14 | Stop reason: HOSPADM

## 2024-02-14 RX ORDER — ONDANSETRON 4 MG/1
4 TABLET, ORALLY DISINTEGRATING ORAL EVERY 8 HOURS PRN
Qty: 30 TABLET | Refills: 0 | Status: SHIPPED | OUTPATIENT
Start: 2024-02-14

## 2024-02-14 RX ADMIN — DOXYCYCLINE HYCLATE 100 MG: 100 CAPSULE ORAL at 08:21

## 2024-02-14 RX ADMIN — ONDANSETRON HYDROCHLORIDE 4 MG: 4 TABLET, FILM COATED ORAL at 09:56

## 2024-02-14 RX ADMIN — MEDROXYPROGESTERONE ACETATE 20 MG: 10 TABLET ORAL at 08:21

## 2024-02-14 RX ADMIN — FAMOTIDINE 10 MG: 10 TABLET ORAL at 11:08

## 2024-02-14 RX ADMIN — METRONIDAZOLE 500 MG: 500 INJECTION, SOLUTION INTRAVENOUS at 04:11

## 2024-02-14 RX ADMIN — DOCUSATE SODIUM 100 MG: 100 CAPSULE, LIQUID FILLED ORAL at 08:21

## 2024-02-14 ASSESSMENT — ACTIVITIES OF DAILY LIVING (ADL)
ADLS_ACUITY_SCORE: 29

## 2024-02-14 NOTE — PROGRESS NOTES
PRIMARY DIAGNOSIS: ACUTE PAIN  OUTPATIENT/OBSERVATION GOALS TO BE MET BEFORE DISCHARGE:  1. Pain Status: Pain free.    2. Return to near baseline physical activity: Yes    3. Cleared for discharge by consultants (if involved): No    Discharge Planner Nurse   Safe discharge environment identified: Yes  Barriers to discharge: Yes       Entered by: Elida Navarro RN 02/14/2024 9:06 AM     Please review provider order for any additional goals.   Nurse to notify provider when observation goals have been met and patient is ready for discharge.

## 2024-02-14 NOTE — PLAN OF CARE
"         PRIMARY Concern: abdominal  pain, rectal pain and vag bled.   SAFETY RISK Concerns (fall risk, behaviors, etc.): no    Isolation/Type: none   Tests/Procedures for NEXT shift: labs in am.  Consults? (Pending/following, signed-off?) OBGYN following  Where is patient from? (Home, TCU, etc.): home   Other Important info for NEXT shift: no vag bleeding present currently.  Hgb 7.9 today. Cervix swab pending result.   Anticipated DC date & active delays: possibly tomorrow  SUMMARY NOTE:  Orientation/Cognitive: Alert and oriented x4  Observation Goals (Met/ Not Met): not met   Mobility Level/Assist Equipment: SBA/friends in room  Antibiotics & Plan (IV/po, length of tx left): Po doxycline and iv flagyl  Pain Management: no pain  Tele/VS/O2: VSS /RA  ABNL Lab/BG: hgb 7.9  Diet: regular   Bowel/Bladder: voiding  Skin Concerns: none   Drains/Devices: PIV x2  saline lock  Patient Stated Goal for Today: \"pain control and rest \"             "

## 2024-02-14 NOTE — PLAN OF CARE
Goal Outcome Evaluation:      Plan of Care Reviewed With: patient           Pt a/o. VSS. Some gas pain. Tolerable. Tolerating reg diet. Voiding. Ambulating. Rudy paper work reviewed with pt. Verbalizes understanding. PIV removed. Take home meds given. Verified 5 rights of med. All questions answered. Follow up aware. Dc home with friend

## 2024-02-14 NOTE — PROGRESS NOTES
PRIMARY DIAGNOSIS: ACUTE PAIN  OUTPATIENT/OBSERVATION GOALS TO BE MET BEFORE DISCHARGE:  1. Pain Status: Pain free.    2. Return to near baseline physical activity: Yes    3. Cleared for discharge by consultants (if involved): No    Discharge Planner Nurse   Safe discharge environment identified: Yes  Barriers to discharge: Yes       Entered by: Priya Martinez RN 02/14/2024 1:30 AM     Please review provider order for any additional goals.   Nurse to notify provider when observation goals have been met and patient is ready for discharge.

## 2024-02-14 NOTE — PLAN OF CARE
PRIMARY DIAGNOSIS: ACUTE PAIN  OUTPATIENT/OBSERVATION GOALS TO BE MET BEFORE DISCHARGE:  1. Pain Status: Pain free.    2. Return to near baseline physical activity: No    3. Cleared for discharge by consultants (if involved): No    Discharge Planner Nurse   Safe discharge environment identified: No  Barriers to discharge: Yes       Entered by: Sravani Gonzalez RN 02/13/2024 10:58 PM     Please review provider order for any additional goals.   Nurse to notify provider when observation goals have been met and patient is ready for discharge.  Yes

## 2024-02-14 NOTE — PROGRESS NOTES
Danny Villareal   GYN daily progress note  02/14/24   HD #2    S: pt feeling better since admission. Feeling abdominal pressure but acute pain and rectal pain improved. She had minimal bleeding yesterday and a small amount on her pad this morning. She is now passing gas which feels better. Not yet had a bm in the last 2 days. Tolerating po intake.     O:Blood pressure 115/73, pulse 94, temperature 98.3  F (36.8  C), temperature source Oral, resp. rate 16, weight 63.1 kg (139 lb 1.8 oz), last menstrual period 01/09/2024, SpO2 98%. S  Abd: soft, diffusely tender, mild tympanic, no masses  Ext: non tender, no edema    02/14/24 0656  CBC with platelets differential  Collected: 02/14/24 0632  Final result  Specimen: Blood from Arm, Right           02/14/24 0656  CBC with platelets and differential  Collected: 02/14/24 0632  Final result  Specimen: Blood from Arm, Right    WBC Count 11.1 High  10e3/uL % Eosinophils 1 %   RBC Count 3.63 Low  10e6/uL % Basophils 0 %   Hemoglobin 7.3 Low  g/dL % Immature Granulocytes 1 %   Hematocrit 25.1 Low  % NRBCs per 100 WBC 0 /100   MCV 69 Low  fL Absolute Neutrophils 7.1 10e3/uL   MCH 20.1 Low  pg Absolute Lymphocytes 2.7 10e3/uL   MCHC 29.1 Low  g/dL Absolute Monocytes 1.1 10e3/uL   RDW 20.6 High  % Absolute Eosinophils 0.1 10e3/uL   Platelet Count 362 10e3/uL Absolute Basophils 0.0 10e3/uL   % Neutrophils 64 % Absolute Immature Granulocytes 0.1 10e3/uL   % Lymphocytes 24 % Absolute NRBCs 0.0 10e3/uL   % Monocytes 10 %          A/P: Danny Villareal is a 28 year old G0 F admitted for observation due to severe abdominal pain and leukocytosis of unknown origin.     Abdominal pain  - CT: enlarged uterus possible adenomyosis vs endometritis. Normal adnexa. Hazy fat stranding around the uterus, small amount of fluid in pelvis could represent small hemoperitoneum or infected fluid.  - source of pain/elevated WBC: endometritis vs endometriosis vs diverticulitis vs colitis  -  leukocytosis improved today  - plan oral doxy and flagyl x 14 days    Anemia  - acute on chronic blood loss anemia  - likely related to prolonged, heavy menses  - plan for provera 20 mg tid for 5 days, then bid x 5days, and then daily x 5 days. Pt to determine if she wants to continue agestin vs return to trying HIRA  - follow up in clinic to discuss the option of outpatient IV iron    Dispo: home today. Follow up with myself or Dr. Forbes in 1-2 weeks. Follow up with GI in 1-2 weeks. Discussed the potential need for diagnostic laparoscope    Dejuan Chopra MD

## 2024-02-14 NOTE — PROGRESS NOTES
PRIMARY DIAGNOSIS: ACUTE PAIN  OUTPATIENT/OBSERVATION GOALS TO BE MET BEFORE DISCHARGE:  1. Pain Status: Pain free.    2. Return to near baseline physical activity: Yes    3. Cleared for discharge by consultants (if involved): No    Discharge Planner Nurse   Safe discharge environment identified: Yes  Barriers to discharge: Yes       Entered by: Elida Navarro RN 02/14/2024 12:56 PM     Please review provider order for any additional goals.   Nurse to notify provider when observation goals have been met and patient is ready for discharge.

## 2024-02-18 LAB
BACTERIA BLD CULT: NO GROWTH
BACTERIA BLD CULT: NO GROWTH

## 2024-02-22 ENCOUNTER — HOSPITAL ENCOUNTER (INPATIENT)
Facility: CLINIC | Age: 29
LOS: 5 days | Discharge: HOME | DRG: 758 | End: 2024-02-27
Attending: EMERGENCY MEDICINE | Admitting: OBSTETRICS & GYNECOLOGY
Payer: COMMERCIAL

## 2024-02-22 ENCOUNTER — APPOINTMENT (OUTPATIENT)
Dept: CT IMAGING | Facility: CLINIC | Age: 29
DRG: 758 | End: 2024-02-22
Attending: EMERGENCY MEDICINE
Payer: COMMERCIAL

## 2024-02-22 ENCOUNTER — APPOINTMENT (OUTPATIENT)
Dept: ULTRASOUND IMAGING | Facility: CLINIC | Age: 29
DRG: 758 | End: 2024-02-22
Attending: EMERGENCY MEDICINE
Payer: COMMERCIAL

## 2024-02-22 DIAGNOSIS — N80.03 ADENOMYOSIS: ICD-10-CM

## 2024-02-22 DIAGNOSIS — N73.0 PID (ACUTE PELVIC INFLAMMATORY DISEASE): Primary | ICD-10-CM

## 2024-02-22 DIAGNOSIS — I82.621 ACUTE DEEP VEIN THROMBOSIS (DVT) OF BRACHIAL VEIN OF RIGHT UPPER EXTREMITY (H): ICD-10-CM

## 2024-02-22 DIAGNOSIS — K76.0 HEPATIC STEATOSIS: ICD-10-CM

## 2024-02-22 DIAGNOSIS — N71.0 ACUTE ENDOMETRITIS: ICD-10-CM

## 2024-02-22 DIAGNOSIS — D64.9 ANEMIA, UNSPECIFIED TYPE: ICD-10-CM

## 2024-02-22 DIAGNOSIS — N73.9 PELVIC ABSCESS IN FEMALE: ICD-10-CM

## 2024-02-22 LAB
ALBUMIN SERPL BCG-MCNC: 4.3 G/DL (ref 3.5–5.2)
ALBUMIN UR-MCNC: NEGATIVE MG/DL
ALP SERPL-CCNC: 54 U/L (ref 40–150)
ALT SERPL W P-5'-P-CCNC: 14 U/L (ref 0–50)
ANION GAP SERPL CALCULATED.3IONS-SCNC: 14 MMOL/L (ref 7–15)
APPEARANCE UR: CLEAR
AST SERPL W P-5'-P-CCNC: 14 U/L (ref 0–45)
BASOPHILS # BLD AUTO: 0.1 10E3/UL (ref 0–0.2)
BASOPHILS NFR BLD AUTO: 1 %
BILIRUB SERPL-MCNC: 0.2 MG/DL
BILIRUB UR QL STRIP: NEGATIVE
BUN SERPL-MCNC: 6.2 MG/DL (ref 6–20)
CALCIUM SERPL-MCNC: 9.3 MG/DL (ref 8.6–10)
CHLORIDE SERPL-SCNC: 105 MMOL/L (ref 98–107)
COLOR UR AUTO: ABNORMAL
CREAT SERPL-MCNC: 0.63 MG/DL (ref 0.51–0.95)
DEPRECATED HCO3 PLAS-SCNC: 21 MMOL/L (ref 22–29)
EGFRCR SERPLBLD CKD-EPI 2021: >90 ML/MIN/1.73M2
EOSINOPHIL # BLD AUTO: 0.1 10E3/UL (ref 0–0.7)
EOSINOPHIL NFR BLD AUTO: 1 %
ERYTHROCYTE [DISTWIDTH] IN BLOOD BY AUTOMATED COUNT: 20.5 % (ref 10–15)
GLUCOSE SERPL-MCNC: 96 MG/DL (ref 70–99)
GLUCOSE UR STRIP-MCNC: NEGATIVE MG/DL
HCO3 BLDV-SCNC: 21 MMOL/L (ref 21–28)
HCT VFR BLD AUTO: 31.5 % (ref 35–47)
HGB BLD-MCNC: 9.1 G/DL (ref 11.7–15.7)
HGB UR QL STRIP: ABNORMAL
HOLD SPECIMEN: NORMAL
IMM GRANULOCYTES # BLD: 0 10E3/UL
IMM GRANULOCYTES NFR BLD: 0 %
KETONES UR STRIP-MCNC: NEGATIVE MG/DL
LACTATE BLD-SCNC: 1.1 MMOL/L
LEUKOCYTE ESTERASE UR QL STRIP: NEGATIVE
LIPASE SERPL-CCNC: 30 U/L (ref 13–60)
LYMPHOCYTES # BLD AUTO: 2.9 10E3/UL (ref 0.8–5.3)
LYMPHOCYTES NFR BLD AUTO: 26 %
MCH RBC QN AUTO: 19.9 PG (ref 26.5–33)
MCHC RBC AUTO-ENTMCNC: 28.9 G/DL (ref 31.5–36.5)
MCV RBC AUTO: 69 FL (ref 78–100)
MONOCYTES # BLD AUTO: 0.7 10E3/UL (ref 0–1.3)
MONOCYTES NFR BLD AUTO: 6 %
MUCOUS THREADS #/AREA URNS LPF: PRESENT /LPF
NEUTROPHILS # BLD AUTO: 7.2 10E3/UL (ref 1.6–8.3)
NEUTROPHILS NFR BLD AUTO: 66 %
NITRATE UR QL: NEGATIVE
NRBC # BLD AUTO: 0 10E3/UL
NRBC BLD AUTO-RTO: 0 /100
PCO2 BLDV: 33 MM HG (ref 40–50)
PH BLDV: 7.41 [PH] (ref 7.32–7.43)
PH UR STRIP: 6.5 [PH] (ref 5–7)
PLATELET # BLD AUTO: 654 10E3/UL (ref 150–450)
PO2 BLDV: 33 MM HG (ref 25–47)
POTASSIUM SERPL-SCNC: 4 MMOL/L (ref 3.4–5.3)
PROT SERPL-MCNC: 7.9 G/DL (ref 6.4–8.3)
RBC # BLD AUTO: 4.57 10E6/UL (ref 3.8–5.2)
RBC URINE: 17 /HPF
SAO2 % BLDV: 65 % (ref 70–75)
SODIUM SERPL-SCNC: 140 MMOL/L (ref 135–145)
SP GR UR STRIP: 1 (ref 1–1.03)
SQUAMOUS EPITHELIAL: 1 /HPF
UROBILINOGEN UR STRIP-MCNC: NORMAL MG/DL
WBC # BLD AUTO: 11.1 10E3/UL (ref 4–11)
WBC URINE: 3 /HPF

## 2024-02-22 PROCEDURE — 93976 VASCULAR STUDY: CPT

## 2024-02-22 PROCEDURE — 74177 CT ABD & PELVIS W/CONTRAST: CPT

## 2024-02-22 PROCEDURE — 82803 BLOOD GASES ANY COMBINATION: CPT

## 2024-02-22 PROCEDURE — 83690 ASSAY OF LIPASE: CPT | Performed by: EMERGENCY MEDICINE

## 2024-02-22 PROCEDURE — 250N000011 HC RX IP 250 OP 636: Performed by: EMERGENCY MEDICINE

## 2024-02-22 PROCEDURE — 85025 COMPLETE CBC W/AUTO DIFF WBC: CPT | Performed by: EMERGENCY MEDICINE

## 2024-02-22 PROCEDURE — 36415 COLL VENOUS BLD VENIPUNCTURE: CPT | Performed by: EMERGENCY MEDICINE

## 2024-02-22 PROCEDURE — 99285 EMERGENCY DEPT VISIT HI MDM: CPT | Mod: 25

## 2024-02-22 PROCEDURE — 96374 THER/PROPH/DIAG INJ IV PUSH: CPT | Mod: 59

## 2024-02-22 PROCEDURE — 82040 ASSAY OF SERUM ALBUMIN: CPT | Performed by: EMERGENCY MEDICINE

## 2024-02-22 PROCEDURE — 76830 TRANSVAGINAL US NON-OB: CPT

## 2024-02-22 PROCEDURE — 120N000001 HC R&B MED SURG/OB

## 2024-02-22 PROCEDURE — 96375 TX/PRO/DX INJ NEW DRUG ADDON: CPT

## 2024-02-22 PROCEDURE — 96361 HYDRATE IV INFUSION ADD-ON: CPT

## 2024-02-22 PROCEDURE — 258N000003 HC RX IP 258 OP 636: Performed by: EMERGENCY MEDICINE

## 2024-02-22 PROCEDURE — 250N000013 HC RX MED GY IP 250 OP 250 PS 637: Performed by: OBSTETRICS & GYNECOLOGY

## 2024-02-22 PROCEDURE — 82374 ASSAY BLOOD CARBON DIOXIDE: CPT | Performed by: EMERGENCY MEDICINE

## 2024-02-22 PROCEDURE — 250N000011 HC RX IP 250 OP 636: Performed by: OBSTETRICS & GYNECOLOGY

## 2024-02-22 PROCEDURE — 87040 BLOOD CULTURE FOR BACTERIA: CPT | Performed by: EMERGENCY MEDICINE

## 2024-02-22 PROCEDURE — 81001 URINALYSIS AUTO W/SCOPE: CPT | Performed by: EMERGENCY MEDICINE

## 2024-02-22 PROCEDURE — 250N000009 HC RX 250: Performed by: EMERGENCY MEDICINE

## 2024-02-22 RX ORDER — HYDROXYZINE HYDROCHLORIDE 25 MG/1
25 TABLET, FILM COATED ORAL EVERY 6 HOURS PRN
Status: DISCONTINUED | OUTPATIENT
Start: 2024-02-22 | End: 2024-02-27 | Stop reason: HOSPADM

## 2024-02-22 RX ORDER — LIDOCAINE 40 MG/G
CREAM TOPICAL
Status: DISCONTINUED | OUTPATIENT
Start: 2024-02-22 | End: 2024-02-27 | Stop reason: HOSPADM

## 2024-02-22 RX ORDER — HYDROMORPHONE HCL IN WATER/PF 6 MG/30 ML
0.4 PATIENT CONTROLLED ANALGESIA SYRINGE INTRAVENOUS
Status: DISCONTINUED | OUTPATIENT
Start: 2024-02-22 | End: 2024-02-27 | Stop reason: HOSPADM

## 2024-02-22 RX ORDER — AMOXICILLIN 250 MG
1 CAPSULE ORAL 2 TIMES DAILY
Status: DISCONTINUED | OUTPATIENT
Start: 2024-02-22 | End: 2024-02-27 | Stop reason: HOSPADM

## 2024-02-22 RX ORDER — MORPHINE SULFATE 4 MG/ML
4 INJECTION, SOLUTION INTRAMUSCULAR; INTRAVENOUS ONCE
Status: COMPLETED | OUTPATIENT
Start: 2024-02-22 | End: 2024-02-22

## 2024-02-22 RX ORDER — MEDROXYPROGESTERONE ACETATE 10 MG
20 TABLET ORAL DAILY
Status: DISCONTINUED | OUTPATIENT
Start: 2024-02-22 | End: 2024-02-24

## 2024-02-22 RX ORDER — AMOXICILLIN 250 MG
1 CAPSULE ORAL 2 TIMES DAILY PRN
Status: DISCONTINUED | OUTPATIENT
Start: 2024-02-22 | End: 2024-02-27 | Stop reason: HOSPADM

## 2024-02-22 RX ORDER — NALOXONE HYDROCHLORIDE 0.4 MG/ML
0.4 INJECTION, SOLUTION INTRAMUSCULAR; INTRAVENOUS; SUBCUTANEOUS
Status: DISCONTINUED | OUTPATIENT
Start: 2024-02-22 | End: 2024-02-27 | Stop reason: HOSPADM

## 2024-02-22 RX ORDER — BISACODYL 10 MG
10 SUPPOSITORY, RECTAL RECTAL DAILY PRN
Status: DISCONTINUED | OUTPATIENT
Start: 2024-02-25 | End: 2024-02-27 | Stop reason: HOSPADM

## 2024-02-22 RX ORDER — AMOXICILLIN 250 MG
2 CAPSULE ORAL 2 TIMES DAILY PRN
Status: DISCONTINUED | OUTPATIENT
Start: 2024-02-22 | End: 2024-02-27 | Stop reason: HOSPADM

## 2024-02-22 RX ORDER — CEFOXITIN 2 G/1
2 INJECTION, POWDER, FOR SOLUTION INTRAVENOUS EVERY 12 HOURS
Status: DISCONTINUED | OUTPATIENT
Start: 2024-02-22 | End: 2024-02-22

## 2024-02-22 RX ORDER — PIPERACILLIN SODIUM, TAZOBACTAM SODIUM 4; .5 G/20ML; G/20ML
4.5 INJECTION, POWDER, LYOPHILIZED, FOR SOLUTION INTRAVENOUS ONCE
Status: COMPLETED | OUTPATIENT
Start: 2024-02-22 | End: 2024-02-22

## 2024-02-22 RX ORDER — FAMOTIDINE 20 MG/1
20 TABLET, FILM COATED ORAL 2 TIMES DAILY
Status: DISCONTINUED | OUTPATIENT
Start: 2024-02-22 | End: 2024-02-27 | Stop reason: HOSPADM

## 2024-02-22 RX ORDER — ONDANSETRON 4 MG/1
4 TABLET, ORALLY DISINTEGRATING ORAL EVERY 6 HOURS PRN
Status: DISCONTINUED | OUTPATIENT
Start: 2024-02-22 | End: 2024-02-27 | Stop reason: HOSPADM

## 2024-02-22 RX ORDER — POLYETHYLENE GLYCOL 3350 17 G/17G
17 POWDER, FOR SOLUTION ORAL DAILY
Status: DISCONTINUED | OUTPATIENT
Start: 2024-02-23 | End: 2024-02-27 | Stop reason: HOSPADM

## 2024-02-22 RX ORDER — NALOXONE HYDROCHLORIDE 0.4 MG/ML
0.2 INJECTION, SOLUTION INTRAMUSCULAR; INTRAVENOUS; SUBCUTANEOUS
Status: DISCONTINUED | OUTPATIENT
Start: 2024-02-22 | End: 2024-02-27 | Stop reason: HOSPADM

## 2024-02-22 RX ORDER — IOPAMIDOL 755 MG/ML
70 INJECTION, SOLUTION INTRAVASCULAR ONCE
Status: COMPLETED | OUTPATIENT
Start: 2024-02-22 | End: 2024-02-22

## 2024-02-22 RX ORDER — PROCHLORPERAZINE MALEATE 10 MG
10 TABLET ORAL EVERY 6 HOURS PRN
Status: DISCONTINUED | OUTPATIENT
Start: 2024-02-22 | End: 2024-02-27 | Stop reason: HOSPADM

## 2024-02-22 RX ORDER — OXYCODONE HYDROCHLORIDE 5 MG/1
5 TABLET ORAL EVERY 4 HOURS PRN
Status: DISCONTINUED | OUTPATIENT
Start: 2024-02-22 | End: 2024-02-27 | Stop reason: HOSPADM

## 2024-02-22 RX ORDER — ACETAMINOPHEN 325 MG/1
975 TABLET ORAL EVERY 8 HOURS
Status: COMPLETED | OUTPATIENT
Start: 2024-02-22 | End: 2024-02-25

## 2024-02-22 RX ORDER — HYDROMORPHONE HCL IN WATER/PF 6 MG/30 ML
0.2 PATIENT CONTROLLED ANALGESIA SYRINGE INTRAVENOUS
Status: DISCONTINUED | OUTPATIENT
Start: 2024-02-22 | End: 2024-02-27 | Stop reason: HOSPADM

## 2024-02-22 RX ORDER — OXYCODONE HYDROCHLORIDE 5 MG/1
10 TABLET ORAL EVERY 4 HOURS PRN
Status: DISCONTINUED | OUTPATIENT
Start: 2024-02-22 | End: 2024-02-27 | Stop reason: HOSPADM

## 2024-02-22 RX ORDER — DOXYCYCLINE 100 MG/10ML
100 INJECTION, POWDER, LYOPHILIZED, FOR SOLUTION INTRAVENOUS EVERY 12 HOURS
Status: DISCONTINUED | OUTPATIENT
Start: 2024-02-22 | End: 2024-02-24

## 2024-02-22 RX ORDER — ACETAMINOPHEN 325 MG/1
650 TABLET ORAL EVERY 4 HOURS PRN
Status: DISCONTINUED | OUTPATIENT
Start: 2024-02-25 | End: 2024-02-27 | Stop reason: HOSPADM

## 2024-02-22 RX ORDER — ONDANSETRON 2 MG/ML
4 INJECTION INTRAMUSCULAR; INTRAVENOUS ONCE
Status: COMPLETED | OUTPATIENT
Start: 2024-02-22 | End: 2024-02-22

## 2024-02-22 RX ORDER — ONDANSETRON 2 MG/ML
4 INJECTION INTRAMUSCULAR; INTRAVENOUS EVERY 6 HOURS PRN
Status: DISCONTINUED | OUTPATIENT
Start: 2024-02-22 | End: 2024-02-27 | Stop reason: HOSPADM

## 2024-02-22 RX ORDER — CALCIUM CARBONATE 500 MG/1
1000 TABLET, CHEWABLE ORAL 4 TIMES DAILY PRN
Status: DISCONTINUED | OUTPATIENT
Start: 2024-02-22 | End: 2024-02-27 | Stop reason: HOSPADM

## 2024-02-22 RX ORDER — CEFOXITIN 2 G/1
2 INJECTION, POWDER, FOR SOLUTION INTRAVENOUS EVERY 6 HOURS
Status: DISCONTINUED | OUTPATIENT
Start: 2024-02-23 | End: 2024-02-24

## 2024-02-22 RX ADMIN — PIPERACILLIN AND TAZOBACTAM 4.5 G: 4; .5 INJECTION, POWDER, FOR SOLUTION INTRAVENOUS at 19:06

## 2024-02-22 RX ADMIN — ONDANSETRON 4 MG: 2 INJECTION INTRAMUSCULAR; INTRAVENOUS at 13:13

## 2024-02-22 RX ADMIN — MORPHINE SULFATE 4 MG: 4 INJECTION, SOLUTION INTRAMUSCULAR; INTRAVENOUS at 16:03

## 2024-02-22 RX ADMIN — DOCUSATE SODIUM 50 MG AND SENNOSIDES 8.6 MG 1 TABLET: 8.6; 5 TABLET, FILM COATED ORAL at 21:39

## 2024-02-22 RX ADMIN — IOPAMIDOL 70 ML: 755 INJECTION, SOLUTION INTRAVENOUS at 17:21

## 2024-02-22 RX ADMIN — HYDROMORPHONE HYDROCHLORIDE 0.4 MG: 0.2 INJECTION, SOLUTION INTRAMUSCULAR; INTRAVENOUS; SUBCUTANEOUS at 22:39

## 2024-02-22 RX ADMIN — DOXYCYCLINE 100 MG: 100 INJECTION, POWDER, LYOPHILIZED, FOR SOLUTION INTRAVENOUS at 22:51

## 2024-02-22 RX ADMIN — SODIUM CHLORIDE 1000 ML: 9 INJECTION, SOLUTION INTRAVENOUS at 16:05

## 2024-02-22 RX ADMIN — FAMOTIDINE 20 MG: 20 TABLET ORAL at 21:39

## 2024-02-22 RX ADMIN — ONDANSETRON 4 MG: 2 INJECTION INTRAMUSCULAR; INTRAVENOUS at 21:58

## 2024-02-22 RX ADMIN — MEDROXYPROGESTERONE ACETATE 20 MG: 10 TABLET ORAL at 22:51

## 2024-02-22 RX ADMIN — MORPHINE SULFATE 4 MG: 4 INJECTION, SOLUTION INTRAMUSCULAR; INTRAVENOUS at 19:06

## 2024-02-22 RX ADMIN — SODIUM CHLORIDE 60 ML: 9 INJECTION, SOLUTION INTRAVENOUS at 17:21

## 2024-02-22 RX ADMIN — HYDROXYZINE HYDROCHLORIDE 25 MG: 25 TABLET, FILM COATED ORAL at 22:39

## 2024-02-22 RX ADMIN — ACETAMINOPHEN 975 MG: 325 TABLET, FILM COATED ORAL at 21:39

## 2024-02-22 ASSESSMENT — ACTIVITIES OF DAILY LIVING (ADL)
ADLS_ACUITY_SCORE: 36

## 2024-02-22 ASSESSMENT — ENCOUNTER SYMPTOMS
ABDOMINAL DISTENTION: 1
HEMATURIA: 0
DYSURIA: 0
CHILLS: 0
FEVER: 0
SORE THROAT: 0
NAUSEA: 1
SHORTNESS OF BREATH: 0
HEADACHES: 0
BLOOD IN STOOL: 0
RECTAL PAIN: 1
ABDOMINAL PAIN: 1
NECK PAIN: 0
RHINORRHEA: 0
COUGH: 0
VOMITING: 1

## 2024-02-22 NOTE — ED PROVIDER NOTES
"  History     Chief Complaint:  Abdominal Pain & Rectal Pain     HPI   Danny Villareal is a 28 year old female, s/p colonoscopy with [facility] yesterday, who presents to the ED with ongoing abdominal and rectal pain. Patient states that her colonoscopy was performed due to abdominal and rectal pain which she has had for months, though there were no findings during this procedure. Shortly after arriving back home from the procedure, she developed abdominal and rectal pain again. She notes that the abdominal pain is located in the LLQ and radiates to the rectum and down the left leg. She describes the pain as a cramping and pressure pain that feels like something is pushing on her abdomen. The pain waxes and wanes in severity, as it is currently a 7/10 but can increase to a \"12/10.\" Heating pads offer some relief for the pain. She took 800 mg Ibuprofen for the pain today without significant relief. Nothing makes the pain worse. She also endorses abdominal distention, nausea, and vomiting. Zofran has not helped the nausea. Her stools have been normal and have not been black or bloody. Danny also reports an additional concern of irregular vaginal bleeding. She follows with OBGYN for this and is on medroxyprogesterone, though is still experiencing bleeding. She also had an hysteroscopy with polypectomy in January and is scheduled to be tested for endometriosis next Monday. Patient denies any concerns for STDs.    Review of Systems   Constitutional:  Negative for chills and fever.   HENT:  Negative for congestion, rhinorrhea and sore throat.    Respiratory:  Negative for cough and shortness of breath.    Cardiovascular:  Negative for chest pain.   Gastrointestinal:  Positive for abdominal distention, abdominal pain, nausea, rectal pain and vomiting. Negative for blood in stool.   Genitourinary:  Positive for vaginal bleeding. Negative for dysuria and hematuria.   Musculoskeletal:  Negative for neck pain.   Neurological: " " Negative for headaches.   All other systems reviewed and are negative.    Independent Historian:   None - Patient Only    Review of External Notes:   I reviewed the OBGYN H & P from last month (1/15/24).      Medications:    Vibramycin  Provera   Flagyl    Zofran   Protonix   Mylicon     Past Medical History:    Anemia   Possible endometriosis   Fibroid uterus     Past Surgical History:    Operative hysteroscopy with morcellator     Physical Exam   Patient Vitals for the past 24 hrs:   BP Temp Temp src Pulse Resp SpO2 Height Weight   02/22/24 2328 110/79 97.8  F (36.6  C) Oral 80 18 99 % -- --   02/22/24 2111 113/73 97.6  F (36.4  C) Oral 79 18 100 % -- --   02/22/24 2053 109/79 -- -- 84 -- 100 % -- --   02/22/24 2000 109/79 -- -- 84 -- 100 % -- --   02/22/24 1900 -- -- -- -- -- 100 % -- --   02/22/24 1830 -- -- -- -- -- 100 % -- --   02/22/24 1819 -- -- -- -- -- 100 % -- --   02/22/24 1816 121/73 -- -- 88 -- 100 % -- --   02/22/24 1604 107/58 -- -- 76 18 100 % -- --   02/22/24 1244 125/85 98.1  F (36.7  C) Temporal 104 18 98 % 1.676 m (5' 6\") 63 kg (139 lb)        Constitutional:       General: Uncomfortable appearing.  HENT:      Head: Normocephalic and atraumatic.   Eyes:      Extraocular Movements: Extraocular movements intact.      Conjunctiva/sclera: Conjunctivae normal.   Cardiovascular:      Rate and Rhythm: Normal rate and regular rhythm.   Pulmonary:      Effort: Pulmonary effort is normal. No respiratory distress.      Breath sounds: Normal breath sounds.   Abdominal:      General: Abdomen is flat. There is no distension.      Palpations: Abdomen is soft.      Tenderness: There is left lower quadrant abdominal tenderness to palpation.   Musculoskeletal:      Cervical back: Normal range of motion. No rigidity.      Right lower leg: No edema.      Left lower leg: No edema.   Skin:     General: Skin is warm and dry.   Neurological:      General: No focal deficit present.      Mental Status: Alert and " oriented to person, place, and time.   Psychiatric:         Mood and Affect: Mood normal.         Behavior: Behavior normal.    Emergency Department Course     Imaging:  CT Abdomen Pelvis w Contrast   Final Result   IMPRESSION:    1.  CT findings suggestive of completed pelvic inflammatory disease, with multiple parauterine rim-enhancing fluid collections, which may reflect a combination of abscess and/or hydrosalpinx/pyosalpinx. Largest collection is located within the    cul-de-sac, corresponding to abnormality on same day pelvic ultrasound. Gynecologic consultation and consideration of contrast-enhanced pelvic MR imaging is recommended for complete assessment of findings.   2.  Enlarged, heterogeneous appearance of the uterus, favoring adenomyosis. However, a superimposed myometritis cannot be excluded.   3.  Hepatic steatosis.            US Pelvis Cmplt w Transvag & Doppler LmtPel Duplex Limited   Final Result   IMPRESSION:     1.  Enlarged, heterogeneous uterus, could represent adenomyosis.   2.  While the left ovary is likely visualized with normal arterial and venous flow, there is an additional structure in the dependent pelvis without definite flow, difficult to completely exclude torsion. Recommend attention on planned CT scan.                  Report per radiology    Laboratory:  Labs Ordered and Resulted from Time of ED Arrival to Time of ED Departure   COMPREHENSIVE METABOLIC PANEL - Abnormal       Result Value    Sodium 140      Potassium 4.0      Carbon Dioxide (CO2) 21 (*)     Anion Gap 14      Urea Nitrogen 6.2      Creatinine 0.63      GFR Estimate >90      Calcium 9.3      Chloride 105      Glucose 96      Alkaline Phosphatase 54      AST 14      ALT 14      Protein Total 7.9      Albumin 4.3      Bilirubin Total 0.2     ROUTINE UA WITH MICROSCOPIC REFLEX TO CULTURE - Abnormal    Color Urine Light Yellow      Appearance Urine Clear      Glucose Urine Negative      Bilirubin Urine Negative       Ketones Urine Negative      Specific Gravity Urine 1.005      Blood Urine Large (*)     pH Urine 6.5      Protein Albumin Urine Negative      Urobilinogen Urine Normal      Nitrite Urine Negative      Leukocyte Esterase Urine Negative      Mucus Urine Present (*)     RBC Urine 17 (*)     WBC Urine 3      Squamous Epithelials Urine 1     CBC WITH PLATELETS AND DIFFERENTIAL - Abnormal    WBC Count 11.1 (*)     RBC Count 4.57      Hemoglobin 9.1 (*)     Hematocrit 31.5 (*)     MCV 69 (*)     MCH 19.9 (*)     MCHC 28.9 (*)     RDW 20.5 (*)     Platelet Count 654 (*)     % Neutrophils 66      % Lymphocytes 26      % Monocytes 6      % Eosinophils 1      % Basophils 1      % Immature Granulocytes 0      NRBCs per 100 WBC 0      Absolute Neutrophils 7.2      Absolute Lymphocytes 2.9      Absolute Monocytes 0.7      Absolute Eosinophils 0.1      Absolute Basophils 0.1      Absolute Immature Granulocytes 0.0      Absolute NRBCs 0.0     ISTAT GASES LACTATE VENOUS POCT - Abnormal    Lactic Acid POCT 1.1      Bicarbonate Venous POCT 21      O2 Sat, Venous POCT 65 (*)     pCO2 Venous POCT 33 (*)     pH Venous POCT 7.41      pO2 Venous POCT 33     LIPASE - Normal    Lipase 30     BLOOD CULTURE   BLOOD CULTURE      Emergency Department Course & Assessments:       Interventions:  Medications   ondansetron (ZOFRAN) injection 4 mg (4 mg Intravenous $Given 2/22/24 1313)   sodium chloride 0.9% BOLUS 1,000 mL (0 mLs Intravenous Stopped 2/22/24 1812)   morphine (PF) injection 4 mg (4 mg Intravenous $Given 2/22/24 1603)   iopamidol (ISOVUE-370) solution 70 mL (70 mLs Intravenous $Given 2/22/24 1721)   Saline (60 mLs As instructed $Given 2/22/24 1721)   piperacillin-tazobactam (ZOSYN) 4.5 g vial to attach to  mL bag (4.5 g Intravenous $New Bag 2/22/24 1906)   morphine (PF) injection 4 mg (4 mg Intravenous $Given 2/22/24 1906)      Independent Interpretation (X-rays, CTs, rhythm strip):  None    Assessments/Consultations/Discussion of  Management or Tests:  ED Course as of 24 0003   u 2024   1544 I obtained history and examined the patient as noted above.   1843 CT Abdomen Pelvis w Contrast   CT findings suggestive of completed pelvic inflammatory disease, with multiple parauterine rim-enhancing fluid collections, which may reflect a combination of abscess and/or hydrosalpinx/pyosalpinx. Largest collection is located within the   cul-de-sac, corresponding to abnormality on same day pelvic ultrasound.     185 Patient requesting more pain medication.  Additional dose of morphine ordered.   1903 Discussed patient with [facility] OBGYN Dr. Restrepo who accepted patient for admission.    Patient updated on image findings and plan for admission.       Social Determinants of Health affecting care:   None    Disposition:  The patient was admitted to the hospital under the care of Dr. Restrepo.     Impression & Plan    CMS Diagnoses: None    Medical Decision Makin-year-old female as described above presents to the emergency department for ongoing vaginal bleeding and worsening lower abdominal pain. Patient hemodynamically stable at time of evaluation. Afebrile. Patient was just recently evaluated yesterday with a negative colonoscopy. Currently sees [facility] and is pending ultrasound next week for further evaluation. Concern for potential endometriosis. On examination, patient appears uncomfortable with significant left-sided abdominal tenderness to palpation. Differential diagnosis considered includes, but not limited to, infectious colitis, diverticulitis, ovarian torsion, tubo-ovarian abscess, endometriosis, colonic perforation, bowel obstruction, urinary tract infection, nephrolithiasis/ureteral lithiasis, pyelonephritis, or pelvic inflammatory disease.     Please refer to ED course above as part of continuation of MDM for details on the patient's treatment course and any changes or updates in care plan beyond my  initial evaluation and MDM creation.     Diagnosis:    ICD-10-CM    1. PID (acute pelvic inflammatory disease)  N73.0       2. Adenomyosis  N80.03       3. Hepatic steatosis  K76.0       4. Pelvic abscess in female  N73.9            Discharge Medications:  Current Discharge Medication List             Scribe Disclosure:  I, Angélica Ojeda, am serving as a scribe at 3:44 PM on 2/22/2024 to document services personally performed by Eliceo Hardy DO based on my observations and the provider's statements to me.   2/22/2024   Eliceo Hardy DO Yeh, Ferris, DO  02/23/24 0003

## 2024-02-22 NOTE — ED TRIAGE NOTES
Colonoscopy yesterday for rectal and abd pain, no findings, pain started after shegot home, took advil 600mg, stilept, today took 800mg ibuprofen, vomited yesterday after procedure and today, took nausea medicine didn't work the first time then it did work,

## 2024-02-23 ENCOUNTER — APPOINTMENT (OUTPATIENT)
Dept: CT IMAGING | Facility: CLINIC | Age: 29
DRG: 758 | End: 2024-02-23
Attending: STUDENT IN AN ORGANIZED HEALTH CARE EDUCATION/TRAINING PROGRAM
Payer: COMMERCIAL

## 2024-02-23 LAB
BASOPHILS # BLD AUTO: 0.1 10E3/UL (ref 0–0.2)
BASOPHILS NFR BLD AUTO: 0 %
EOSINOPHIL # BLD AUTO: 0.1 10E3/UL (ref 0–0.7)
EOSINOPHIL NFR BLD AUTO: 1 %
ERYTHROCYTE [DISTWIDTH] IN BLOOD BY AUTOMATED COUNT: 20.1 % (ref 10–15)
HCT VFR BLD AUTO: 27.2 % (ref 35–47)
HGB BLD-MCNC: 7.7 G/DL (ref 11.7–15.7)
IMM GRANULOCYTES # BLD: 0.1 10E3/UL
IMM GRANULOCYTES NFR BLD: 0 %
INR PPP: 1.19 (ref 0.85–1.15)
LYMPHOCYTES # BLD AUTO: 2.3 10E3/UL (ref 0.8–5.3)
LYMPHOCYTES NFR BLD AUTO: 19 %
MCH RBC QN AUTO: 19.3 PG (ref 26.5–33)
MCHC RBC AUTO-ENTMCNC: 28.3 G/DL (ref 31.5–36.5)
MCV RBC AUTO: 68 FL (ref 78–100)
MONOCYTES # BLD AUTO: 0.9 10E3/UL (ref 0–1.3)
MONOCYTES NFR BLD AUTO: 8 %
NEUTROPHILS # BLD AUTO: 8.4 10E3/UL (ref 1.6–8.3)
NEUTROPHILS NFR BLD AUTO: 72 %
NRBC # BLD AUTO: 0 10E3/UL
NRBC BLD AUTO-RTO: 0 /100
PLATELET # BLD AUTO: 517 10E3/UL (ref 150–450)
RBC # BLD AUTO: 3.99 10E6/UL (ref 3.8–5.2)
WBC # BLD AUTO: 11.8 10E3/UL (ref 4–11)

## 2024-02-23 PROCEDURE — 85025 COMPLETE CBC W/AUTO DIFF WBC: CPT | Performed by: OBSTETRICS & GYNECOLOGY

## 2024-02-23 PROCEDURE — 72192 CT PELVIS W/O DYE: CPT

## 2024-02-23 PROCEDURE — 120N000001 HC R&B MED SURG/OB

## 2024-02-23 PROCEDURE — 250N000013 HC RX MED GY IP 250 OP 250 PS 637: Performed by: OBSTETRICS & GYNECOLOGY

## 2024-02-23 PROCEDURE — 999N000154 HC STATISTIC RADIOLOGY XRAY, US, CT, MAR, NM

## 2024-02-23 PROCEDURE — 36415 COLL VENOUS BLD VENIPUNCTURE: CPT | Performed by: OBSTETRICS & GYNECOLOGY

## 2024-02-23 PROCEDURE — 250N000011 HC RX IP 250 OP 636: Performed by: OBSTETRICS & GYNECOLOGY

## 2024-02-23 PROCEDURE — 85610 PROTHROMBIN TIME: CPT | Performed by: STUDENT IN AN ORGANIZED HEALTH CARE EDUCATION/TRAINING PROGRAM

## 2024-02-23 PROCEDURE — 36415 COLL VENOUS BLD VENIPUNCTURE: CPT | Performed by: STUDENT IN AN ORGANIZED HEALTH CARE EDUCATION/TRAINING PROGRAM

## 2024-02-23 RX ORDER — NALOXONE HYDROCHLORIDE 0.4 MG/ML
0.4 INJECTION, SOLUTION INTRAMUSCULAR; INTRAVENOUS; SUBCUTANEOUS
Status: DISCONTINUED | OUTPATIENT
Start: 2024-02-23 | End: 2024-02-24

## 2024-02-23 RX ORDER — FENTANYL CITRATE 0.05 MG/ML
25-50 INJECTION, SOLUTION INTRAMUSCULAR; INTRAVENOUS EVERY 5 MIN PRN
Status: DISCONTINUED | OUTPATIENT
Start: 2024-02-23 | End: 2024-02-23

## 2024-02-23 RX ORDER — FLUMAZENIL 0.1 MG/ML
0.2 INJECTION, SOLUTION INTRAVENOUS
Status: DISCONTINUED | OUTPATIENT
Start: 2024-02-23 | End: 2024-02-24

## 2024-02-23 RX ORDER — LIDOCAINE 40 MG/G
CREAM TOPICAL
Status: DISCONTINUED | OUTPATIENT
Start: 2024-02-23 | End: 2024-02-24

## 2024-02-23 RX ORDER — NALOXONE HYDROCHLORIDE 0.4 MG/ML
0.2 INJECTION, SOLUTION INTRAMUSCULAR; INTRAVENOUS; SUBCUTANEOUS
Status: DISCONTINUED | OUTPATIENT
Start: 2024-02-23 | End: 2024-02-24

## 2024-02-23 RX ADMIN — CEFOXITIN SODIUM 2 G: 2 POWDER, FOR SOLUTION INTRAVENOUS at 05:29

## 2024-02-23 RX ADMIN — HYDROMORPHONE HYDROCHLORIDE 0.4 MG: 0.2 INJECTION, SOLUTION INTRAMUSCULAR; INTRAVENOUS; SUBCUTANEOUS at 07:49

## 2024-02-23 RX ADMIN — HYDROMORPHONE HYDROCHLORIDE 0.4 MG: 0.2 INJECTION, SOLUTION INTRAMUSCULAR; INTRAVENOUS; SUBCUTANEOUS at 17:21

## 2024-02-23 RX ADMIN — DOXYCYCLINE 100 MG: 100 INJECTION, POWDER, LYOPHILIZED, FOR SOLUTION INTRAVENOUS at 20:57

## 2024-02-23 RX ADMIN — PROCHLORPERAZINE EDISYLATE 10 MG: 5 INJECTION INTRAMUSCULAR; INTRAVENOUS at 20:46

## 2024-02-23 RX ADMIN — ACETAMINOPHEN 975 MG: 325 TABLET, FILM COATED ORAL at 12:07

## 2024-02-23 RX ADMIN — MEDROXYPROGESTERONE ACETATE 20 MG: 10 TABLET ORAL at 07:51

## 2024-02-23 RX ADMIN — HYDROMORPHONE HYDROCHLORIDE 0.4 MG: 0.2 INJECTION, SOLUTION INTRAMUSCULAR; INTRAVENOUS; SUBCUTANEOUS at 20:45

## 2024-02-23 RX ADMIN — ONDANSETRON 4 MG: 2 INJECTION INTRAMUSCULAR; INTRAVENOUS at 16:52

## 2024-02-23 RX ADMIN — ONDANSETRON 4 MG: 2 INJECTION INTRAMUSCULAR; INTRAVENOUS at 04:47

## 2024-02-23 RX ADMIN — CEFOXITIN SODIUM 2 G: 2 POWDER, FOR SOLUTION INTRAVENOUS at 17:22

## 2024-02-23 RX ADMIN — FAMOTIDINE 20 MG: 10 INJECTION, SOLUTION INTRAVENOUS at 07:49

## 2024-02-23 RX ADMIN — HYDROMORPHONE HYDROCHLORIDE 0.4 MG: 0.2 INJECTION, SOLUTION INTRAMUSCULAR; INTRAVENOUS; SUBCUTANEOUS at 04:48

## 2024-02-23 RX ADMIN — DOXYCYCLINE 100 MG: 100 INJECTION, POWDER, LYOPHILIZED, FOR SOLUTION INTRAVENOUS at 07:50

## 2024-02-23 RX ADMIN — CEFOXITIN SODIUM 2 G: 2 POWDER, FOR SOLUTION INTRAVENOUS at 11:19

## 2024-02-23 RX ADMIN — DOCUSATE SODIUM 50 MG AND SENNOSIDES 8.6 MG 1 TABLET: 8.6; 5 TABLET, FILM COATED ORAL at 07:50

## 2024-02-23 RX ADMIN — CEFOXITIN SODIUM 2 G: 2 POWDER, FOR SOLUTION INTRAVENOUS at 00:36

## 2024-02-23 ASSESSMENT — ACTIVITIES OF DAILY LIVING (ADL)
ADLS_ACUITY_SCORE: 19
ADLS_ACUITY_SCORE: 27
ADLS_ACUITY_SCORE: 27
ADLS_ACUITY_SCORE: 19
ADLS_ACUITY_SCORE: 19
ADLS_ACUITY_SCORE: 27
ADLS_ACUITY_SCORE: 19
ADLS_ACUITY_SCORE: 27
ADLS_ACUITY_SCORE: 19
ADLS_ACUITY_SCORE: 23
ADLS_ACUITY_SCORE: 19
ADLS_ACUITY_SCORE: 27
ADLS_ACUITY_SCORE: 27
ADLS_ACUITY_SCORE: 19
ADLS_ACUITY_SCORE: 27
ADLS_ACUITY_SCORE: 19
ADLS_ACUITY_SCORE: 27
ADLS_ACUITY_SCORE: 19
ADLS_ACUITY_SCORE: 27

## 2024-02-23 NOTE — H&P
OB HISTORY AND PHYSICAL    Patient Name: Danny Villareal   Admit Date: 222  MR #: [id number]   Acct #: [id number]   : 1995     Chief Complaint/Reason for Visit: pelvic pain, pelvic infection    History of Present Illness: Danny Villareal is a 28 year old female here for evaluation of pelvic pain, pelvic infection. She was seen in January with heavy VB and found to have endometrial polyps. Hysteroscopic removal of endometrial polyps was performed on 1/15 without issue. Thereafter, she struggled with irregular/abnormal uterine bleeding. She was managed with progesterone and OCP without resolution but didn't have pain or other issues until developing heavy VB, N/V, and severe abd pain on 2/10. Evaluation in the ED on  revealed a possibly enlarging posterior uterine fibroid vs adenomyosis but her pain was able to be managed and bleeding improved so she was discharged home. She later developed abdominal bloating and acute re-worsening of pain and re-presented and was admitted on  for management. She was started on endometritis antibiotics with Rocephin x1 and Doxy/Flagyl and also started on Provera taper for bleeding control and was able to be discharged home for outpatient management on .    She states that she had been doing well after discharge initially and has been compliant with her meds. She underwent a colonoscopy with PRICE yesterday and since then has had recurrence of severe abdominal pain, nausea, and resumption of abnormal bleeding (though does note missed a day of meds for being NPO for the colonoscopy). In ED today, imaging shows multiple rim-enhancing fluid collections adjacent to the uterus (favoring abscess formation) as well as a discrete one measuring 5.0 x 3.3 x 2.6 cm. She has already received a dose of IV Zosyn and IV pain meds. She notes her pain is now down to a 3/10 but she notes it is so bad radiating down her legs (albin left) at times that it can be hard to walk. Also  she has a constant rectal pressure sensation as well.    Review of Systems:  General: denies fevers  CV: denies CP  Pulm: denies SOB  Neuro: denies HA  Abd: positive for abdominal pain, occ N/V  Gyn: denies vaginal discharge, positive for vaginal bleeding, positive for rectovaginal pressure  Skin: denies rashes  MSK: denies calf pain, positive for some radiating leg pains L>R  Psych: denies depression     History:   OB History   No obstetric history on file.         Past Medical History:   Diagnosis Date    Anemia     Endometriosis     Fibroid uterus        Past Surgical History:   Procedure Laterality Date    OPERATIVE HYSTEROSCOPY WITH MORCELLATOR N/A 1/15/2024    Procedure: hysteroscopy with myosure polypectomy;  Surgeon: Flora Forbes MD;  Location: [facility] OR       No family history on file.    Social History     Socioeconomic History    Marital status: Single     Spouse name: Not on file    Number of children: Not on file    Years of education: Not on file    Highest education level: Not on file   Occupational History    Not on file   Tobacco Use    Smoking status: Never    Smokeless tobacco: Never   Substance and Sexual Activity    Alcohol use: Yes    Drug use: Never    Sexual activity: Not on file   Other Topics Concern    Not on file   Social History Narrative    Not on file     Social Determinants of Health     Financial Resource Strain: Not on file   Food Insecurity: Not on file   Transportation Needs: Not on file   Physical Activity: Not on file   Stress: Not on file   Social Connections: Not on file   Interpersonal Safety: Not on file   Housing Stability: Not on file       Allergy Information:        I have reviewed the patient's allergies.      No Known Allergies     Home Medications:   Current Outpatient Medications   Medication Sig Dispense Refill    doxycycline hyclate (VIBRAMYCIN) 100 MG capsule Take 1 capsule (100 mg) by mouth every 12 hours 28 capsule 0    ibuprofen (ADVIL/MOTRIN) 200 MG tablet  Take 200-600 mg by mouth every 6 hours as needed for pain      medroxyPROGESTERone (PROVERA) 10 MG tablet Take 2 tablets (20 mg) by mouth 3 times daily 30 tablet 0    metroNIDAZOLE (FLAGYL) 500 MG tablet Take 1 tablet (500 mg) by mouth 2 times daily for 14 days 28 tablet 0    ondansetron (ZOFRAN ODT) 4 MG ODT tab Take 1 tablet (4 mg) by mouth every 8 hours as needed for nausea 30 tablet 0    simethicone (MYLICON) 125 MG chewable tablet Take 1 tablet (125 mg) by mouth 2 times daily 20 tablet 0       Physical Examination:  Vitals:  Temp:  [98.1  F (36.7  C)] 98.1  F (36.7  C)  Pulse:  [] 88  Resp:  [18] 18  BP: (107-125)/(58-85) 121/73  SpO2:  [98 %-100 %] 100 %    Exam:  General: no acute distress  Head: normocephalic, atraumatic  Eyes: EOMI  CV: pulses intact  Pulm: no increased effort  Neuro: CN II-XII grossly intact  Abd: TTP in lower quadrants L>R, NTTP in upper quadrants  Gyn: deferred  Skin: no rashes  MSK: no calf tenderness  Psych: AOx3, appropriate mood/affect      Laboratory and Additional Data Reviewed:  Any associated labs, path, imaging personally reviewed  Results for orders placed or performed during the hospital encounter of 02/22/24   CT Abdomen Pelvis w Contrast     Status: None    Narrative    EXAM: CT ABDOMEN PELVIS W CONTRAST  LOCATION: [facility]  DATE: 2/22/2024    INDICATION: Left lower quadrant abdominal pain and vaginal bleeding; colonoscopy performed yesterday.  COMPARISON: CT abdomen/pelvis 02/13/2024; same day pelvic ultrasound.  TECHNIQUE: CT scan of the abdomen and pelvis was performed following injection of IV contrast. Multiplanar reformats were obtained. Dose reduction techniques were used.  CONTRAST: 70 mL Isovue 370.    FINDINGS:    LOWER CHEST: No suspicious abnormality.    HEPATOBILIARY: Hepatic steatosis.    Gallbladder is normal.    No intrahepatic or intrahepatic biliary ductal dilatation.    PANCREAS: Enhances normally. No peripancreatic  inflammatory fat stranding.    SPLEEN: Enhances normally. Normal size.    ADRENAL GLANDS: Normal.    KIDNEYS: Both kidneys enhance symmetrically, without hydronephrosis.    No nephroureterolithiasis.    Urinary bladder is unremarkable.    PELVIC ORGANS: Enlarged, heterogeneous uterus, which appears to contain multiple myometrial cysts, suggesting adenomyosis. Multiple rim-enhancing fluid collections adjacent to the uterus, favoring abscess formation.    These is located within the cul-de-sac, measuring 3.4 x 5.2 cm, corresponding to abnormality identified on same day pelvic ultrasound. Several of these rim-enhancing structures have a tubular appearance, such as that within the left adnexa, series 3   image 150, and may reflect pyosalpinx/hydrosalpinx.    BOWEL: No evidence of acute gastrointestinal inflammation or obstruction. Normal appendix.    No intraperitoneal free air.    LYMPH NODES: No suspicious abdominal or pelvic lymphadenopathy.    VASCULATURE: No abdominal aortic aneurysm.    MUSCULOSKELETAL: No suspicious abnormality.    OTHER: No additionally significant abnormalities.      Impression    IMPRESSION:   1.  CT findings suggestive of completed pelvic inflammatory disease, with multiple parauterine rim-enhancing fluid collections, which may reflect a combination of abscess and/or hydrosalpinx/pyosalpinx. Largest collection is located within the   cul-de-sac, corresponding to abnormality on same day pelvic ultrasound. Gynecologic consultation and consideration of contrast-enhanced pelvic MR imaging is recommended for complete assessment of findings.  2.  Enlarged, heterogeneous appearance of the uterus, favoring adenomyosis. However, a superimposed myometritis cannot be excluded.  3.  Hepatic steatosis.       US Pelvis Cmplt w Transvag & Doppler LmtPel Duplex Limited     Status: None    Narrative    EXAM: US PELVIS COMPLETE W TRANSVAGINAL AND DOPPLER LIMITED  LOCATION: [facility]  DATE: 2/22/2024    INDICATION: Pelvic pain, LLQ abdominal pain, vaginal bleeding, torsion abscess eval  COMPARISON: CT 02/13/2024, pelvic ultrasound 2/12/2024  TECHNIQUE: Transabdominal scans were performed. Endovaginal ultrasound was performed to better visualize the adnexa. Color flow with spectral Doppler and waveform analysis performed.    FINDINGS:    UTERUS: 12.0 x 8.3 x 8.3 cm. Mildly enlarged in normal position. Junctional zone is indistinct and likely thickened without definite, measurable fibroids visualized.    ENDOMETRIUM: 8 mm. Normal smooth endometrium.    RIGHT OVARY: 3.9 x 3.9 x 3.2 cm. Normal with arterial and venous duplex flow identified.    LEFT OVARY: Likely left ovary measuring 4.0 x 2.9 x 2.9 cm. Arterial and venous duplex flow identified.    Trace free fluid in the pelvis. There is an additional rounded, hyperechoic structure in the dependent pelvis near the midline measuring 5.0 x 3.3 x 2.6 cm. This is close to the left adnexa but is favored to be separate. No significant internal flow   visualized within the structure.      Impression    IMPRESSION:    1.  Enlarged, heterogeneous uterus, could represent adenomyosis.  2.  While the left ovary is likely visualized with normal arterial and venous flow, there is an additional structure in the dependent pelvis without definite flow, difficult to completely exclude torsion. Recommend attention on planned CT scan.         [place] Draw     Status: None    Narrative    The following orders were created for panel order [place] Draw.  Procedure                               Abnormality         Status                     ---------                               -----------         ------                     Extra Red Top Tube[[id number]]                               Final result               Extra Green Top (Lithium...[[id number]]                      Final result               Extra Purple Top Tube[[id number]]                            Final  result                 Please view results for these tests on the individual orders.   Lipase     Status: Normal   Result Value Ref Range    Lipase 30 13 - 60 U/L   Extra Red Top Tube     Status: None   Result Value Ref Range    Hold Specimen jic    Extra Green Top (Lithium Heparin) Tube     Status: None   Result Value Ref Range    Hold Specimen JIC    Extra Purple Top Tube     Status: None   Result Value Ref Range    Hold Specimen jic    Comprehensive metabolic panel     Status: Abnormal   Result Value Ref Range    Sodium 140 135 - 145 mmol/L    Potassium 4.0 3.4 - 5.3 mmol/L    Carbon Dioxide (CO2) 21 (L) 22 - 29 mmol/L    Anion Gap 14 7 - 15 mmol/L    Urea Nitrogen 6.2 6.0 - 20.0 mg/dL    Creatinine 0.63 0.51 - 0.95 mg/dL    GFR Estimate >90 >60 mL/min/1.73m2    Calcium 9.3 8.6 - 10.0 mg/dL    Chloride 105 98 - 107 mmol/L    Glucose 96 70 - 99 mg/dL    Alkaline Phosphatase 54 40 - 150 U/L    AST 14 0 - 45 U/L    ALT 14 0 - 50 U/L    Protein Total 7.9 6.4 - 8.3 g/dL    Albumin 4.3 3.5 - 5.2 g/dL    Bilirubin Total 0.2 <=1.2 mg/dL   UA with Microscopic reflex to Culture     Status: Abnormal    Specimen: Urine, Clean Catch   Result Value Ref Range    Color Urine Light Yellow Colorless, Straw, Light Yellow, Yellow    Appearance Urine Clear Clear    Glucose Urine Negative Negative mg/dL    Bilirubin Urine Negative Negative    Ketones Urine Negative Negative mg/dL    Specific Gravity Urine 1.005 1.003 - 1.035    Blood Urine Large (A) Negative    pH Urine 6.5 5.0 - 7.0    Protein Albumin Urine Negative Negative mg/dL    Urobilinogen Urine Normal Normal, 2.0 mg/dL    Nitrite Urine Negative Negative    Leukocyte Esterase Urine Negative Negative    Mucus Urine Present (A) None Seen /LPF    RBC Urine 17 (H) <=2 /HPF    WBC Urine 3 <=5 /HPF    Squamous Epithelials Urine 1 <=1 /HPF    Narrative    Urine Culture not indicated   CBC with platelets and differential     Status: Abnormal   Result Value Ref Range    WBC Count 11.1  (H) 4.0 - 11.0 10e3/uL    RBC Count 4.57 3.80 - 5.20 10e6/uL    Hemoglobin 9.1 (L) 11.7 - 15.7 g/dL    Hematocrit 31.5 (L) 35.0 - 47.0 %    MCV 69 (L) 78 - 100 fL    MCH 19.9 (L) 26.5 - 33.0 pg    MCHC 28.9 (L) 31.5 - 36.5 g/dL    RDW 20.5 (H) 10.0 - 15.0 %    Platelet Count 654 (H) 150 - 450 10e3/uL    % Neutrophils 66 %    % Lymphocytes 26 %    % Monocytes 6 %    % Eosinophils 1 %    % Basophils 1 %    % Immature Granulocytes 0 %    NRBCs per 100 WBC 0 <1 /100    Absolute Neutrophils 7.2 1.6 - 8.3 10e3/uL    Absolute Lymphocytes 2.9 0.8 - 5.3 10e3/uL    Absolute Monocytes 0.7 0.0 - 1.3 10e3/uL    Absolute Eosinophils 0.1 0.0 - 0.7 10e3/uL    Absolute Basophils 0.1 0.0 - 0.2 10e3/uL    Absolute Immature Granulocytes 0.0 <=0.4 10e3/uL    Absolute NRBCs 0.0 10e3/uL   iStat Gases (lactate) venous, POCT     Status: Abnormal   Result Value Ref Range    Lactic Acid POCT 1.1 <=2.0 mmol/L    Bicarbonate Venous POCT 21 21 - 28 mmol/L    O2 Sat, Venous POCT 65 (L) 70 - 75 %    pCO2 Venous POCT 33 (L) 40 - 50 mm Hg    pH Venous POCT 7.41 7.32 - 7.43    pO2 Venous POCT 33 25 - 47 mm Hg   CBC with platelets differential     Status: Abnormal    Narrative    The following orders were created for panel order CBC with platelets differential.  Procedure                               Abnormality         Status                     ---------                               -----------         ------                     CBC with platelets and d...[[id number]]  Abnormal            Final result                 Please view results for these tests on the individual orders.         Assessment and Plan: Danny Villareal is a 28 year old female here for pelvic pain, pelvic infection PID vs TOA.    PID vs TOA:  - Admit to floor  - D/w pt the colonoscopy yesterday is not why this is happening. Explained may be coincidental but more likely manipulation of the rectum/colon directly caused discomfort bumping into the infection/inflammation but also  may have led to increased colonic inflammation from normal post-procedure state to irritate things more, but isn't why there is an infection.  - Received IV Zosyn in ED already for broad spectrum coverage. Will tailor to Mefoxin 2g IV q6hr and Doxy 100mg IV q12hr as that is a recommended first line regimen for PID and for TOA (Mefoxin is q6h for PID and q12hr for TOA so will use q6hr freq). She had been on outpatient Doxy/Flagyl PO for the last several days so will hold on addition of Flagyl for now since received course of that, and Mefoxin with Doxy should offer coverage.  - WBC slightly elevated at 11.1, lactate normal. Will repeat CBC w/ diff in AM.  - D/w pt will start with management w/ IV abx alone. D/w pt ~70% of patients will be able to be managed with IV abx alone in this scenario related to the excellent pelvic blood flow allowing abx penetration even in an abscess.  - D/w pt if fails IV abx alone - or possibly even if not failing but to help resolve things faster/synergistically - will consult IR to review pt's imaging and case and see if they feel placement of drain would be possible and beneficial. D/w pt benefit of a drain would be faster removal of the infected fluid but also then could send it for culture and sensitivity to better tailor abx as indicated. Additionally, that would avoid a pelvic washout surgery where could have surgical injury or necessary removal of organs (e.g., salpingectomy).  - D/w pt if fails IV abx and IR drain (or IR cannot/won't perform drain), would likely need diagnostic and therapeutic surgical pelvic washout procedure. For now, that is not the plan and would be our last resort. Pt aware  - Diet: advance to regular since no acute plans for surgery/procedure  - Activity: up ad christian  - Pain: Ibuprofen PO, Tylenol PO, Oxycodone PO, and Dilaudid IV added to chart for pain management. Pt notes pain currently 3/10 and wants 0/10. Explained no pain is unrealistic and, frankly, I  find pain scales worthless, as her 3 may be another patient's 8. The goal is to have her pain be comfortably tolerable; if intolerable, she needs more meds. She does note currently tolerable and notes understanding won't be 0 at least until things start improving.  - Vitals per routine on floor  - Imaging as above, if subjectively/clinically improving on IV abx, will likely repeat imaging to reassess objective improvement and if actually on the correct path to resolving this issue for her    Abnormal bleeding:  - Has struggled with irregular/abnormal uterine bleeding post-op  - Likely related to the infection causing issues, but will continue Provera. Today was to be her last day of the Provera 20mg PO BID and tomorrow to start qDay dosing for 5d. Will order for a dose tonight and then daily starting tomorrow AM.      Total of 1hr 50min spent on this high risk admission from the ED when including reviewing data and documenting, with >30min spent directly at bedside with patient.      Adán Restrepo MD  [facility] WAYNE Mariscal, PA  2/22/2024, 8:31 PM

## 2024-02-23 NOTE — PHARMACY-ADMISSION MEDICATION HISTORY
Pharmacist Admission Medication History    Admission medication history is complete. The information provided in this note is only as accurate as the sources available at the time of the update.    Information Source(s): Patient and CareEverywhere/SureScripts via in-person    Pertinent Information: Patient has held antibiotics for colonoscopy with last doses 2/20/24 in the evening. Antibiotics were started 2/13.    Changes made to PTA medication list:  Added: None  Deleted: Protonix, Tums  Changed: None    Allergies reviewed with patient and updates made in EHR: yes    Medication History Completed By: Victor Hugo Mcdermott RP 2/22/2024 6:55 PM    PTA Med List   Medication Sig Last Dose    doxycycline hyclate (VIBRAMYCIN) 100 MG capsule Take 1 capsule (100 mg) by mouth every 12 hours 2/20/2024    ibuprofen (ADVIL/MOTRIN) 200 MG tablet Take 200-600 mg by mouth every 6 hours as needed for pain 2/22/2024 at [facility]    medroxyPROGESTERone (PROVERA) 10 MG tablet Take 2 tablets (20 mg) by mouth 3 times daily 2/21/2024 at PM    metroNIDAZOLE (FLAGYL) 500 MG tablet Take 1 tablet (500 mg) by mouth 2 times daily for 14 days 2/20/2024    ondansetron (ZOFRAN ODT) 4 MG ODT tab Take 1 tablet (4 mg) by mouth every 8 hours as needed for nausea Past Week at PRN    simethicone (MYLICON) 125 MG chewable tablet Take 1 tablet (125 mg) by mouth 2 times daily Past Week at PRN

## 2024-02-23 NOTE — PLAN OF CARE
Orientation: A&Ox4  Activity: SBA  Diet/BS Checks: reg  Tele:  n/a  IV Access/Drains: R PIV SL int abx  Pain Management: pelvic pressure/ rectum pain, prn dilaudid given  Abnormal VS/Results: VSS on RA  Bowel/Bladder: cont B/B  Skin/Wounds: intact  Consults: IR  D/C Disposition: pending  Other Info: pt had friend stay the night, denied dizziness bed alarm off. Prn zofran given for nausea. Pt also menstruation.  Pt has pain when IV runs too fast, rate reduced pt refused new IV.

## 2024-02-23 NOTE — PROGRESS NOTES
Summary: Pelvic pain.    Dx: Acute pelvic inflammatory disease    Orientation: Aox4, calm and cooperative, emotional at times.    Vitals: VSS, on RA.    Pain management: Schedule tylenol, Zofran x1 for nausea.    Tele: NA    IV Access/drains: PIV SL    Diet: Regular    Mobility: SBA    GI/: Continent B&B    Wound/Skin: WDL      Discharge Plan: Pending      See Flow sheets for assessment

## 2024-02-23 NOTE — PROGRESS NOTES
"Inpatient Progress Note:    S: Pt is tired, continues to have intermittent rectal and pelvic pain. Vaginal bleeding is not heavy per pt, will have some clots pass but feels her bleeding is lighter than typical for her.    O  /84 (BP Location: Left arm)   Pulse 90   Temp 98.1  F (36.7  C) (Oral)   Resp 17   Ht 1.676 m (5' 6\")   Wt 63 kg (139 lb)   LMP 01/09/2024   SpO2 100%   BMI 22.44 kg/m    General: no acute distress  Abdomen: soft, tender to deep palpation, no rebound or guarding  : deferred     Danny Villareal is a 28 year old female here for pelvic pain, pelvic infection PID vs TOA.     PID vs TOA:  - Received IV Zosyn in ED already for broad spectrum coverage. Will tailor to Mefoxin 2g IV q6hr and Doxy 100mg IV q12hr as that is a recommended first line regimen for PID and for TOA (Mefoxin is q6h for PID and q12hr for TOA so will use q6hr freq). She had been on outpatient Doxy/Flagyl PO for the last several days so will hold on addition of Flagyl for now since received course of that, and Mefoxin with Doxy should offer coverage.  - WBC slightly elevated at 11.1, lactate normal. Will repeat CBC w/ diff in AM.  - plan for IR consultation for possible abscess drainage, send for culture with sensitivities  - D/w pt benefit of a drain would be faster removal of the infected fluid but also then could send it for culture and sensitivity to better tailor abx as indicated. Additionally, that would avoid a pelvic washout surgery where could have surgical injury or necessary removal of organs (e.g., salpingectomy).  - disc with pt plan to cont IV abx, if continued sx then may require diagnostic laparoscopy for pelvic wash out, scar tissue resection +/- salpingectomy  - D/w pt if fails IV abx and IR drain (or IR cannot/won't perform drain), would likely need diagnostic and therapeutic surgical pelvic washout procedure. For now, that is not the plan and would be our last resort. Pt aware  - Diet: advance " to regular since no acute plans for surgery/procedure  - Activity: up ad christian  - Pain: Ibuprofen PO, Tylenol PO, Oxycodone PO, and Dilaudid IV added to chart for pain management. Pt notes pain currently 3/10 and wants 0/10. Explained no pain is unrealistic and, frankly, I find pain scales worthless, as her 3 may be another patient's 8. The goal is to have her pain be comfortably tolerable; if intolerable, she needs more meds. She does note currently tolerable and notes understanding won't be 0 at least until things start improving.  - Vitals per routine on floor  - Imaging as above, if subjectively/clinically improving on IV abx, will likely repeat imaging to reassess objective improvement and if actually on the correct path to resolving this issue for her     Abnormal bleeding:  - Has struggled with irregular/abnormal uterine bleeding post-op  - Likely related to the infection causing issues, but will continue Provera. Today was to be her last day of the Provera 20mg PO BID and tomorrow to start qDay dosing for 5d.   - disc likely depolupron vs orlissa outpatient for mgmt of suspected endometriosis    Dispo: continue inpatient management, plan for attempted IR drainage of posterior cul de sac abscess. Pt aware of continued admission overnight    Flora Forbes MD  OBGYN, [facility]  02/23/24 8:23 AM

## 2024-02-23 NOTE — CONSULTS
IR consult note written and order completed.     Thanks, Trang [facility] Interventional Radiology CNP ([phone]) (phone [phone])

## 2024-02-23 NOTE — ED NOTES
[facility]  ED Nurse Handoff Report    ED Chief complaint: Abdominal Pain      ED Diagnosis:   Final diagnoses:   PID (acute pelvic inflammatory disease)       Code Status: Full Code    Allergies: No Known Allergies    Patient Story: Hysteroscopy yesterday for rectal and abd pain, no findings, pain started after shegot home, took advil 600mg, stilept, today took 800mg ibuprofen, vomited yesterday after procedure and today, took nausea medicine didn't work the first time then it did work.   Focused Assessment:  A&ox4. Denies SOB & cough. Pt complaining of LLQ abdominal pain that radiates into rectum. Pt denies n/v.  Pt on period and reports passing clots. Pt dizzy on standing, SBA. Pt pain controlled with IV analgesics. Vitally stable and afebrile. Calm, cooperative, & tearful.     Treatments and/or interventions provided: labs, imaging, pain medications  Patient's response to treatments and/or interventions: tolerated well  CT Abdomen Pelvis w Contrast   Final Result   IMPRESSION:    1.  CT findings suggestive of completed pelvic inflammatory disease, with multiple parauterine rim-enhancing fluid collections, which may reflect a combination of abscess and/or hydrosalpinx/pyosalpinx. Largest collection is located within the    cul-de-sac, corresponding to abnormality on same day pelvic ultrasound. Gynecologic consultation and consideration of contrast-enhanced pelvic MR imaging is recommended for complete assessment of findings.   2.  Enlarged, heterogeneous appearance of the uterus, favoring adenomyosis. However, a superimposed myometritis cannot be excluded.   3.  Hepatic steatosis.            US Pelvis Cmplt w Transvag & Doppler LmtPel Duplex Limited   Final Result   IMPRESSION:     1.  Enlarged, heterogeneous uterus, could represent adenomyosis.   2.  While the left ovary is likely visualized with normal arterial and venous flow, there is an additional structure in the dependent pelvis without  definite flow, difficult to completely exclude torsion. Recommend attention on planned CT scan.                  Labs Ordered and Resulted from Time of ED Arrival to Time of ED Departure   COMPREHENSIVE METABOLIC PANEL - Abnormal       Result Value    Sodium 140      Potassium 4.0      Carbon Dioxide (CO2) 21 (*)     Anion Gap 14      Urea Nitrogen 6.2      Creatinine 0.63      GFR Estimate >90      Calcium 9.3      Chloride 105      Glucose 96      Alkaline Phosphatase 54      AST 14      ALT 14      Protein Total 7.9      Albumin 4.3      Bilirubin Total 0.2     ROUTINE UA WITH MICROSCOPIC REFLEX TO CULTURE - Abnormal    Color Urine Light Yellow      Appearance Urine Clear      Glucose Urine Negative      Bilirubin Urine Negative      Ketones Urine Negative      Specific Gravity Urine 1.005      Blood Urine Large (*)     pH Urine 6.5      Protein Albumin Urine Negative      Urobilinogen Urine Normal      Nitrite Urine Negative      Leukocyte Esterase Urine Negative      Mucus Urine Present (*)     RBC Urine 17 (*)     WBC Urine 3      Squamous Epithelials Urine 1     CBC WITH PLATELETS AND DIFFERENTIAL - Abnormal    WBC Count 11.1 (*)     RBC Count 4.57      Hemoglobin 9.1 (*)     Hematocrit 31.5 (*)     MCV 69 (*)     MCH 19.9 (*)     MCHC 28.9 (*)     RDW 20.5 (*)     Platelet Count 654 (*)     % Neutrophils 66      % Lymphocytes 26      % Monocytes 6      % Eosinophils 1      % Basophils 1      % Immature Granulocytes 0      NRBCs per 100 WBC 0      Absolute Neutrophils 7.2      Absolute Lymphocytes 2.9      Absolute Monocytes 0.7      Absolute Eosinophils 0.1      Absolute Basophils 0.1      Absolute Immature Granulocytes 0.0      Absolute NRBCs 0.0     ISTAT GASES LACTATE VENOUS POCT - Abnormal    Lactic Acid POCT 1.1      Bicarbonate Venous POCT 21      O2 Sat, Venous POCT 65 (*)     pCO2 Venous POCT 33 (*)     pH Venous POCT 7.41      pO2 Venous POCT 33     LIPASE - Normal    Lipase 30     BLOOD CULTURE    BLOOD CULTURE        To be done/followed up on inpatient unit:  monitoring, pain management, pt     Does this patient have any cognitive concerns?:  none    Activity level - Baseline/Home:  Independent  Activity Level - Current:   Independent    Patient's Preferred language: English   Needed?: No    Isolation: None  Infection: Not Applicable  Patient tested for COVID 19 prior to admission: NO  Bariatric?: No    Vital Signs:   Vitals:    02/22/24 1816 02/22/24 1819 02/22/24 1830 02/22/24 1900   BP: 121/73      BP Location:       Pulse: 88      Resp:       Temp:       TempSrc:       SpO2: 100% 100% 100% 100%   Weight:       Height:           Cardiac Rhythm:     Was the PSS-3 completed:   Yes  What interventions are required if any?               Family Comments: none      For the majority of the shift this patient's behavior was Green.   Behavioral interventions performed were none.    ED NURSE PHONE NUMBER: *[phone]

## 2024-02-23 NOTE — PROGRESS NOTES
Chart review for CT abscess drain and cath placement today at 1300. INR ordered per Dr. Gonzalez, platelets 512 this AM.    Call to unit, per bedside RN patient is alert and able to consent, NPO since midnight, has working PIV. Pt is receiving IV dilaudid for ongoing abdominal pain. No further concerns at this time, RN will keep patient NPO until procedure time.

## 2024-02-23 NOTE — CONSULTS
Ms Villareal is 27 yo with presumed PID and pelvic fluid collection which requested for possible drain.     Review of the imaging demonstrates complex fluid with areas of peripheral enhancement. Difficult window for percutaneous access, but will attempt CT guided aspiration and/or drain depending on percutaneous access. Please confirm patient is NPO for 6-8 hours for sedation purposes.     Miki Gonzalez MD

## 2024-02-23 NOTE — ED NOTES
Assumed care at this time.    Clarita Thomson RN,.......................................... 2/22/2024   7:35 PM

## 2024-02-23 NOTE — PROGRESS NOTES
RECEIVING UNIT ED HANDOFF REVIEW    ED Nurse Handoff Report was reviewed by: Yonas Estrada RN on February 22, 2024 at 8:41 PM

## 2024-02-24 LAB
BASOPHILS # BLD AUTO: 0 10E3/UL (ref 0–0.2)
BASOPHILS NFR BLD AUTO: 0 %
EOSINOPHIL # BLD AUTO: 0 10E3/UL (ref 0–0.7)
EOSINOPHIL NFR BLD AUTO: 0 %
ERYTHROCYTE [DISTWIDTH] IN BLOOD BY AUTOMATED COUNT: 20 % (ref 10–15)
HCT VFR BLD AUTO: 26.6 % (ref 35–47)
HGB BLD-MCNC: 7.8 G/DL (ref 11.7–15.7)
IMM GRANULOCYTES # BLD: 0 10E3/UL
IMM GRANULOCYTES NFR BLD: 0 %
LYMPHOCYTES # BLD AUTO: 2.3 10E3/UL (ref 0.8–5.3)
LYMPHOCYTES NFR BLD AUTO: 19 %
MCH RBC QN AUTO: 19.8 PG (ref 26.5–33)
MCHC RBC AUTO-ENTMCNC: 29.3 G/DL (ref 31.5–36.5)
MCV RBC AUTO: 68 FL (ref 78–100)
MONOCYTES # BLD AUTO: 0.7 10E3/UL (ref 0–1.3)
MONOCYTES NFR BLD AUTO: 5 %
NEUTROPHILS # BLD AUTO: 9.4 10E3/UL (ref 1.6–8.3)
NEUTROPHILS NFR BLD AUTO: 76 %
NRBC # BLD AUTO: 0 10E3/UL
NRBC BLD AUTO-RTO: 0 /100
PLATELET # BLD AUTO: 545 10E3/UL (ref 150–450)
RBC # BLD AUTO: 3.94 10E6/UL (ref 3.8–5.2)
WBC # BLD AUTO: 12.5 10E3/UL (ref 4–11)

## 2024-02-24 PROCEDURE — 36569 INSJ PICC 5 YR+ W/O IMAGING: CPT

## 2024-02-24 PROCEDURE — 250N000011 HC RX IP 250 OP 636: Mod: JZ | Performed by: STUDENT IN AN ORGANIZED HEALTH CARE EDUCATION/TRAINING PROGRAM

## 2024-02-24 PROCEDURE — 85025 COMPLETE CBC W/AUTO DIFF WBC: CPT | Performed by: STUDENT IN AN ORGANIZED HEALTH CARE EDUCATION/TRAINING PROGRAM

## 2024-02-24 PROCEDURE — 250N000013 HC RX MED GY IP 250 OP 250 PS 637: Performed by: OBSTETRICS & GYNECOLOGY

## 2024-02-24 PROCEDURE — 250N000009 HC RX 250: Performed by: STUDENT IN AN ORGANIZED HEALTH CARE EDUCATION/TRAINING PROGRAM

## 2024-02-24 PROCEDURE — 250N000013 HC RX MED GY IP 250 OP 250 PS 637: Performed by: STUDENT IN AN ORGANIZED HEALTH CARE EDUCATION/TRAINING PROGRAM

## 2024-02-24 PROCEDURE — 250N000011 HC RX IP 250 OP 636: Performed by: OBSTETRICS & GYNECOLOGY

## 2024-02-24 PROCEDURE — 120N000001 HC R&B MED SURG/OB

## 2024-02-24 PROCEDURE — 36415 COLL VENOUS BLD VENIPUNCTURE: CPT | Performed by: STUDENT IN AN ORGANIZED HEALTH CARE EDUCATION/TRAINING PROGRAM

## 2024-02-24 PROCEDURE — 272N000433 HC KIT CATH IV 18 OR 20G CM, POWERGLIDE W MAX BARRIER

## 2024-02-24 RX ORDER — LIDOCAINE 40 MG/G
CREAM TOPICAL
Status: DISCONTINUED | OUTPATIENT
Start: 2024-02-24 | End: 2024-02-27 | Stop reason: HOSPADM

## 2024-02-24 RX ORDER — MEDROXYPROGESTERONE ACETATE 10 MG
20 TABLET ORAL 2 TIMES DAILY
Status: DISCONTINUED | OUTPATIENT
Start: 2024-02-24 | End: 2024-02-27 | Stop reason: HOSPADM

## 2024-02-24 RX ORDER — METRONIDAZOLE 500 MG/100ML
500 INJECTION, SOLUTION INTRAVENOUS EVERY 12 HOURS
Status: DISCONTINUED | OUTPATIENT
Start: 2024-02-24 | End: 2024-02-24

## 2024-02-24 RX ORDER — DOXYCYCLINE 100 MG/10ML
100 INJECTION, POWDER, LYOPHILIZED, FOR SOLUTION INTRAVENOUS EVERY 12 HOURS
Status: DISCONTINUED | OUTPATIENT
Start: 2024-02-24 | End: 2024-02-26

## 2024-02-24 RX ORDER — METRONIDAZOLE 500 MG/100ML
500 INJECTION, SOLUTION INTRAVENOUS EVERY 12 HOURS
Status: DISCONTINUED | OUTPATIENT
Start: 2024-02-24 | End: 2024-02-26

## 2024-02-24 RX ORDER — CEFOXITIN 2 G/1
2 INJECTION, POWDER, FOR SOLUTION INTRAVENOUS EVERY 6 HOURS
Status: DISCONTINUED | OUTPATIENT
Start: 2024-02-24 | End: 2024-02-26

## 2024-02-24 RX ADMIN — CEFOXITIN SODIUM 2 G: 2 POWDER, FOR SOLUTION INTRAVENOUS at 15:31

## 2024-02-24 RX ADMIN — DOCUSATE SODIUM 50 MG AND SENNOSIDES 8.6 MG 1 TABLET: 8.6; 5 TABLET, FILM COATED ORAL at 20:04

## 2024-02-24 RX ADMIN — DOCUSATE SODIUM 50 MG AND SENNOSIDES 8.6 MG 1 TABLET: 8.6; 5 TABLET, FILM COATED ORAL at 08:18

## 2024-02-24 RX ADMIN — METRONIDAZOLE 500 MG: 500 INJECTION, SOLUTION INTRAVENOUS at 14:36

## 2024-02-24 RX ADMIN — CEFOXITIN SODIUM 2 G: 2 POWDER, FOR SOLUTION INTRAVENOUS at 08:55

## 2024-02-24 RX ADMIN — FAMOTIDINE 20 MG: 20 TABLET ORAL at 20:05

## 2024-02-24 RX ADMIN — DOXYCYCLINE 100 MG: 100 INJECTION, POWDER, LYOPHILIZED, FOR SOLUTION INTRAVENOUS at 13:54

## 2024-02-24 RX ADMIN — METRONIDAZOLE 500 MG: 500 INJECTION, SOLUTION INTRAVENOUS at 22:55

## 2024-02-24 RX ADMIN — HYDROMORPHONE HYDROCHLORIDE 0.4 MG: 0.2 INJECTION, SOLUTION INTRAMUSCULAR; INTRAVENOUS; SUBCUTANEOUS at 06:09

## 2024-02-24 RX ADMIN — ONDANSETRON 4 MG: 2 INJECTION INTRAMUSCULAR; INTRAVENOUS at 06:09

## 2024-02-24 RX ADMIN — CEFOXITIN SODIUM 2 G: 2 POWDER, FOR SOLUTION INTRAVENOUS at 02:03

## 2024-02-24 RX ADMIN — CEFOXITIN SODIUM 2 G: 2 POWDER, FOR SOLUTION INTRAVENOUS at 21:45

## 2024-02-24 RX ADMIN — MEDROXYPROGESTERONE ACETATE 20 MG: 10 TABLET ORAL at 20:04

## 2024-02-24 RX ADMIN — POLYETHYLENE GLYCOL 3350 17 G: 17 POWDER, FOR SOLUTION ORAL at 08:18

## 2024-02-24 RX ADMIN — LIDOCAINE HYDROCHLORIDE 1 ML: 10 INJECTION, SOLUTION EPIDURAL; INFILTRATION; INTRACAUDAL; PERINEURAL at 13:44

## 2024-02-24 RX ADMIN — FAMOTIDINE 20 MG: 20 TABLET ORAL at 08:19

## 2024-02-24 RX ADMIN — ACETAMINOPHEN 975 MG: 325 TABLET, FILM COATED ORAL at 13:58

## 2024-02-24 RX ADMIN — MEDROXYPROGESTERONE ACETATE 20 MG: 10 TABLET ORAL at 08:18

## 2024-02-24 RX ADMIN — ACETAMINOPHEN 975 MG: 325 TABLET, FILM COATED ORAL at 20:05

## 2024-02-24 ASSESSMENT — ACTIVITIES OF DAILY LIVING (ADL)
ADLS_ACUITY_SCORE: 27
ADLS_ACUITY_SCORE: 27
ADLS_ACUITY_SCORE: 24
ADLS_ACUITY_SCORE: 27
ADLS_ACUITY_SCORE: 24
ADLS_ACUITY_SCORE: 27
ADLS_ACUITY_SCORE: 24
ADLS_ACUITY_SCORE: 27
ADLS_ACUITY_SCORE: 24
ADLS_ACUITY_SCORE: 24
ADLS_ACUITY_SCORE: 27
ADLS_ACUITY_SCORE: 24
ADLS_ACUITY_SCORE: 27
ADLS_ACUITY_SCORE: 24
ADLS_ACUITY_SCORE: 27
ADLS_ACUITY_SCORE: 24
ADLS_ACUITY_SCORE: 27

## 2024-02-24 NOTE — PROCEDURES
[facility]    Single Lumen Midline Placement    Date/Time: 2/24/2024 1:40 PM    Performed by: Oliva Mujica RN  Authorized by: Flora Forbes MD  Indications: vascular access      UNIVERSAL PROTOCOL   Site Marked: Yes  Prior Images Obtained and Reviewed:  NA  Required items: Required blood products, implants, devices and special equipment available    Patient identity confirmed:  Verbally with patient, arm band and hospital-assigned identification number  NA - No sedation, light sedation, or local anesthesia  Confirmation Checklist:  Patient's identity using two indicators, relevant allergies, procedure was appropriate and matched the consent or emergent situation and correct equipment/implants were available  Time out: Immediately prior to the procedure a time out was called    Universal Protocol: the Joint Commission Universal Protocol was followed    Preparation: Patient was prepped and draped in usual sterile fashion       ANESTHESIA    Anesthesia:  Local infiltration  Local Anesthetic:  Lidocaine 1% without epinephrine  Anesthetic Total (mL):  1      SEDATION    Patient Sedated: No        Preparation: skin prepped with 2% chlorhexidine and skin prepped with ChloraPrep  Skin prep agent: skin prep agent completely dried prior to procedure  Sterile barriers: maximum sterile barriers were used: cap, mask, sterile gown, sterile gloves, and large sterile sheet  Hand hygiene: hand hygiene performed prior to central venous catheter insertion  Type of line used: Midline  Catheter type: single lumen  Lumen type: valved  Catheter size: 3 Fr  Brand: Bard  Lot number: [id number]  Placement method: MST and ultrasound  Number of attempts: 1  Difficulty threading catheter: no  Successful placement: yes  Orientation: right    Location: basilic vein  Tip Location: distal to axillary vein  Arm circumference: adults 10 cm  Extremity circumference: 24  Visible catheter length: 0  Internal length: 15  cm  Total catheter length: 15  Dressing and securement: chlorhexidine disc applied, statlock and occlusive dressing applied  Post procedure assessment: blood return through all ports  PROCEDURE   Patient Tolerance:  Patient tolerated the procedure well with no immediate complicationsDescribe Procedure: RUE midline placed for access.  OK to use

## 2024-02-24 NOTE — PLAN OF CARE
[id number]    Summary: presents to the ED with ongoing abdominal and rectal pain after colonoscopy.    Primary Diagnosis:  pelvic infection PID vs TOA.    Orientation: A&Ox4    Activity: SBA    Diet/BS Checks: Reg    Tele:  N/A    IV Access/Drains: L PIV SL int abx    Pain Management: pelvic pressure/ rectum pain, PRN Dilaudid     Abnormal VS/Results: VSS on RA, Hgb-7.7, WBC-11.8    Bowel/Bladder: cont B/B    Skin/Wounds: intact    Consults: IR, OB/Gynaec    D/C Disposition: pending    Other Info: IR Drain insertion not done due to high risk, pt is very weak. Vomited twice-PRN Zofran given. Mild vaginal bleeding still present w/ small clots.

## 2024-02-24 NOTE — PLAN OF CARE
[id number]  Orientation: a&ox4  Activity: sba  Diet/BS Checks: reg  Tele:  n/a  IV Access/Drains: L PIV SL int antibx  Pain Management: dilaudid given x 2 for c/o abdominal and rectal pain  Abnormal VS/Results: hgb 7.7. VSS on RA  Bowel/Bladder: cont b/b  Skin/Wounds: intact  Consults: OB/GYN  D/C Disposition: pending  Other Info: compazine given x1.zofran given x1 Mild vaginal bleeding present w/ clots

## 2024-02-24 NOTE — PROGRESS NOTES
"Inpatient Progress Note:    S: Pt doing better today. Pain is now every 6-7 hours instead of every 3 hours. She continues to have intermittent bleeding. Notes bleeding when she goes to the bathroom but small amount on pad. She was able to eat some rice this morning. Feels her nausea is related to pain flare.    She spoke on the phone with her family. They were upset that she agreed to the drain placement. They strongly wish for her to avoid surgery, pt states that if she was to require surgery she would want to return to [facility] to be with her family. Her family does not want her to have surgery as an unmarried women for concerns about fertility.  I also spoke with her mom on the phone via patient's FaceTime. Her mom was able to share that when this happened a couple of years ago in [facility], Danny was admitted to the hospital, treated with antibiotics, and was ultimately diagnosed with an infection related to the bowel that improved with the antibiotics.    O:  /80 (BP Location: Right arm)   Pulse 105   Temp 98  F (36.7  C) (Oral)   Resp 17   Ht 1.676 m (5' 6\")   Wt 63 kg (139 lb)   LMP 01/09/2024   SpO2 100%   BMI 22.44 kg/m    General: no acute distress  Abdomen: soft, mildly tender to deep palpation, no rebound or guarding  : deferred    A/P: Danny Villareal is a 28 year old female here for pelvic pain, pelvic infection PID vs TOA. HD#2     PID vs TOA vs GI etiology:  - Received IV Zosyn in ED already for broad spectrum coverage. Will tailor to Mefoxin 2g IV q6hr and Doxy 100mg IV q12hr as that is a recommended first line regimen for PID and for TOA (Mefoxin is q6h for PID and q12hr for TOA so will use q6hr freq). She had been on outpatient Doxy/Flagyl PO for the last several days so will hold on addition of Flagyl for now since received course of that, and Mefoxin with Doxy should offer coverage.  - WBC with slight increase, neutrophils also slight increase since admission, will add flagyl 500 " mg q12hr to regimen  - s/p IR consultation for possible drainage of fluid collection in posterior cul de sac. Unable to access area due to surrounding bowel.  - disc with pt plan to cont IV abx, sx improved although WBC and neutrophils increased so flagyl added today. Disc goal of avoiding surgery, however if continued increase in WBC and worsened imaging diagnostic laparoscopy may be necessary for diagnosis and treatment, would want to perform LSC when Gen Surg vs colon and rectal available in event of GI etiology  - Imaging as above, if subjectively/clinically improving on IV abx, will likely repeat imaging to reassess objective improvement prior to transitioning to PO abx and discharge     Abnormal bleeding:  - Provera 20mg BID ordered  - Has struggled with irregular/abnormal uterine bleeding post-op  - Likely related to the infection causing issues, but will continue Provera 20mg, increased to BID for improvement of bleeding given anemia  - disc likely depolupron vs orlissa outpatient for mgmt of suspected endometriosis    Anemia  - acute on chronic blood loss anemia due to vaginal bleeding  - IV venofer    Pain: Ibuprofen PO, Tylenol PO, Oxycodone PO, and Dilaudid IV added to chart for pain management.  IV access: pt on fourth IV due to pain with infusions, medline/PICC ordered  Diet: advance to regular since no acute plans for surgery/procedure  Activity: up ad christian, encouraged increased PO intake and ambulation today since pt is feeling better  VTE ppx: SCDs  Full code    Dispo: continue inpatient management    Flora Forbes MD  OBGYN, [facility]  02/24/24 11:04 AM     A total of 30 minutes spent with the patient, speaking with her family, and documenting

## 2024-02-24 NOTE — PROVIDER NOTIFICATION
MD Notification    Notified Person: MD    Notified Person Name:  Prabhakar    Notification Date/Time: 2145 2/23/24    Notification Interaction: phone    Purpose of Notification: pt complaining of increased vaginal bleeding and 10/10 pain in rectum/abdomen. VS stable    Orders Received: monitoring bleeding.    Comments: will monitor bleeding overnight

## 2024-02-25 LAB
BASOPHILS # BLD AUTO: 0 10E3/UL (ref 0–0.2)
BASOPHILS NFR BLD AUTO: 1 %
EOSINOPHIL # BLD AUTO: 0.2 10E3/UL (ref 0–0.7)
EOSINOPHIL NFR BLD AUTO: 2 %
ERYTHROCYTE [DISTWIDTH] IN BLOOD BY AUTOMATED COUNT: 20.1 % (ref 10–15)
HCT VFR BLD AUTO: 25.1 % (ref 35–47)
HGB BLD-MCNC: 7.3 G/DL (ref 11.7–15.7)
IMM GRANULOCYTES # BLD: 0 10E3/UL
IMM GRANULOCYTES NFR BLD: 1 %
LYMPHOCYTES # BLD AUTO: 2.7 10E3/UL (ref 0.8–5.3)
LYMPHOCYTES NFR BLD AUTO: 31 %
MCH RBC QN AUTO: 19.6 PG (ref 26.5–33)
MCHC RBC AUTO-ENTMCNC: 29.1 G/DL (ref 31.5–36.5)
MCV RBC AUTO: 67 FL (ref 78–100)
MONOCYTES # BLD AUTO: 0.7 10E3/UL (ref 0–1.3)
MONOCYTES NFR BLD AUTO: 8 %
NEUTROPHILS # BLD AUTO: 4.9 10E3/UL (ref 1.6–8.3)
NEUTROPHILS NFR BLD AUTO: 57 %
NRBC # BLD AUTO: 0 10E3/UL
NRBC BLD AUTO-RTO: 0 /100
PLATELET # BLD AUTO: 538 10E3/UL (ref 150–450)
RBC # BLD AUTO: 3.73 10E6/UL (ref 3.8–5.2)
WBC # BLD AUTO: 8.5 10E3/UL (ref 4–11)

## 2024-02-25 PROCEDURE — 250N000011 HC RX IP 250 OP 636: Mod: JZ | Performed by: OBSTETRICS & GYNECOLOGY

## 2024-02-25 PROCEDURE — 120N000001 HC R&B MED SURG/OB

## 2024-02-25 PROCEDURE — 250N000013 HC RX MED GY IP 250 OP 250 PS 637: Performed by: STUDENT IN AN ORGANIZED HEALTH CARE EDUCATION/TRAINING PROGRAM

## 2024-02-25 PROCEDURE — 85004 AUTOMATED DIFF WBC COUNT: CPT | Performed by: STUDENT IN AN ORGANIZED HEALTH CARE EDUCATION/TRAINING PROGRAM

## 2024-02-25 PROCEDURE — 250N000013 HC RX MED GY IP 250 OP 250 PS 637: Performed by: OBSTETRICS & GYNECOLOGY

## 2024-02-25 PROCEDURE — 36415 COLL VENOUS BLD VENIPUNCTURE: CPT | Performed by: STUDENT IN AN ORGANIZED HEALTH CARE EDUCATION/TRAINING PROGRAM

## 2024-02-25 RX ADMIN — MEDROXYPROGESTERONE ACETATE 20 MG: 10 TABLET ORAL at 20:01

## 2024-02-25 RX ADMIN — ACETAMINOPHEN 975 MG: 325 TABLET, FILM COATED ORAL at 04:31

## 2024-02-25 RX ADMIN — METRONIDAZOLE 500 MG: 500 INJECTION, SOLUTION INTRAVENOUS at 09:30

## 2024-02-25 RX ADMIN — FAMOTIDINE 20 MG: 20 TABLET ORAL at 08:46

## 2024-02-25 RX ADMIN — DOXYCYCLINE 100 MG: 100 INJECTION, POWDER, LYOPHILIZED, FOR SOLUTION INTRAVENOUS at 00:17

## 2024-02-25 RX ADMIN — CEFOXITIN SODIUM 2 G: 2 POWDER, FOR SOLUTION INTRAVENOUS at 16:50

## 2024-02-25 RX ADMIN — CEFOXITIN SODIUM 2 G: 2 POWDER, FOR SOLUTION INTRAVENOUS at 04:24

## 2024-02-25 RX ADMIN — CEFOXITIN SODIUM 2 G: 2 POWDER, FOR SOLUTION INTRAVENOUS at 09:23

## 2024-02-25 RX ADMIN — ACETAMINOPHEN 650 MG: 325 TABLET, FILM COATED ORAL at 23:16

## 2024-02-25 RX ADMIN — METRONIDAZOLE 500 MG: 500 INJECTION, SOLUTION INTRAVENOUS at 23:17

## 2024-02-25 RX ADMIN — CALCIUM CARBONATE (ANTACID) CHEW TAB 500 MG 1000 MG: 500 CHEW TAB at 05:10

## 2024-02-25 RX ADMIN — ONDANSETRON 4 MG: 2 INJECTION INTRAMUSCULAR; INTRAVENOUS at 21:16

## 2024-02-25 RX ADMIN — CEFOXITIN SODIUM 2 G: 2 POWDER, FOR SOLUTION INTRAVENOUS at 22:31

## 2024-02-25 RX ADMIN — DOXYCYCLINE 100 MG: 100 INJECTION, POWDER, LYOPHILIZED, FOR SOLUTION INTRAVENOUS at 10:44

## 2024-02-25 RX ADMIN — FAMOTIDINE 20 MG: 20 TABLET ORAL at 19:58

## 2024-02-25 RX ADMIN — ACETAMINOPHEN 975 MG: 325 TABLET, FILM COATED ORAL at 11:44

## 2024-02-25 RX ADMIN — MEDROXYPROGESTERONE ACETATE 20 MG: 10 TABLET ORAL at 08:46

## 2024-02-25 ASSESSMENT — ACTIVITIES OF DAILY LIVING (ADL)
ADLS_ACUITY_SCORE: 26
ADLS_ACUITY_SCORE: 24
ADLS_ACUITY_SCORE: 24
ADLS_ACUITY_SCORE: 21
ADLS_ACUITY_SCORE: 24
ADLS_ACUITY_SCORE: 21
ADLS_ACUITY_SCORE: 26
ADLS_ACUITY_SCORE: 21
ADLS_ACUITY_SCORE: 26
ADLS_ACUITY_SCORE: 26
ADLS_ACUITY_SCORE: 21
ADLS_ACUITY_SCORE: 26
ADLS_ACUITY_SCORE: 21
ADLS_ACUITY_SCORE: 26
ADLS_ACUITY_SCORE: 26

## 2024-02-25 NOTE — PROGRESS NOTES
Gynecology Daily Progress Note    Patient name: Danny Villareal  : 1995  Admit date: 2024  MRN: [id number]  Acct: [id number]      Assessment/Plan:  Danny Villareal is a 28y female who admitted for pelvic pain, pelvic infection PID vs TOA, AUB. Today is HD#4.    PID vs TOA (vs GI etiology):  - Received IV Zosyn in ED initially for broad spectrum coverage. Tailored at admission to Mefoxin 2g IV q6hr and Doxy 100mg IV q12hr as that is a recommended first line regimen for PID and for TOA (Mefoxin is q6h for PID and q12hr for TOA so elected to use q6hr freq). Yesterday, WBC noted to have increased with left-shift so Flagyl 500mg q12hr added as well.  - This AM, WBC declined to normal at 8.5 with resolution of left-shift as well  - s/p IR consultation for possible drainage of fluid collection in posterior cul de sac but they were unable to access the area due to surrounding bowel.  - Pt strongly desires to avoid surgery (see prior notes and details regarding Dr. Forbes's FaceTime with pt's family in [facility]). D/w pt if continued lack of improvement, would need to perform diagnostic and therapeutic laparoscopic pelvic washout and would likely try to coordinate this with Gen Surg or Colorectal Surg assistance available in even of GI etiology given pt's reported prior hx.  - Pain: ibuprofen PO, Tylenol PO, Oxycodone PO, and Dilaudid IV for breakthrough. Pt over the last 12hr now down to just Tylenol for pain control, finally not having severe pain episodes  - Access: after 4th PIV and pain, pt received PICC yesterday for better access. Cont to use, working well  - Diet: regular  - Activity: up at [facility]  - VTE ppx: SCDs when not ambulating  - Vitals: per routine  - Full code  - Dispo: Today is only the first day patient has had objective improvement in labs and subjective improvement in pain. Given this has now been a long process of struggles for her of >1mo of bleeding, pain, and now a second admission for  pelvic infection (plus a hx of pelvic infection requiring prolonged IV abx in the past), I would suggest more conservatively remaining inpatient at least through today on the IV regimen to avoid too aggressively transitioning and once again setting herself back. If not a fluke that she is improving and things still looking good tomorrow, likely tomorrow would repeat imaging to ensure objective response in size of infectious fluid collection and transition to PO meds. From there, may want her inpatient for 24hr on PO meds (vs 12hr), so most likely would be discharged home tomorrow evening or, more realistically, Tuesday morning. Pt aware and agrees with the plan.    AUB, subsequent anemia:  - Has been an ongoing issue post-op for ~1mo now  - Provera increased back to 20mg PO BID for bleeding control, likely is related to the infection causing issues. Hopeful this will improve/resolve once the infection is finally managed  - s/p IV Venofir  - Can take PO iron after discharge as well      Subjective:  This AM, pt notes her pain is finally much improved and no longer needing narcotics. She has been tolerating the IV abx well and expresses grateful finally improving but worries about recurrence of this and how we can ensure fully treated/resolved for her. No more N/V and is tolerating a regular diet. She notes no new acute concerns this AM.    ROS:  Constitutional: denies fevers  Cardiovascular: denies chest pain  Respiratory: denies dyspnea  Gastrointestinal: negative for abdominal pain today  Neuro: denies headaches, vision changes  Gyn: see HPI  MSK: denies calf pain      Objective:  Vitals:  Temp:  [97.9  F (36.6  C)-98.9  F (37.2  C)] (P) 98.7  F (37.1  C)  Pulse:  [] (P) 87  Resp:  [18] (P) 18  BP: (100-109)/(65-66) (P) 109/68  SpO2:  [100 %] (P) 100 %    Exam:  General: no acute distress  Cardiovascular: pulses intact, no peripheral edema  Pulmonary: no respiratory difficulty, normal non-labored  breathing  Abdomen: soft, non-tender to palpation, no rebound or guarding  Psych: mood appropriate      Labs/Imaging:  Results for orders placed or performed during the hospital encounter of 02/22/24 (from the past 24 hour(s))   Single Lumen Midline Placement    Narrative    Georgia Viera RN     2/24/2024  1:42 PM  [facility]    Single Lumen Midline Placement    Date/Time: 2/24/2024 1:40 PM    Performed by: Oliva Mujica RN  Authorized by: Flora Forbes MD  Indications: vascular access      UNIVERSAL PROTOCOL   Site Marked: Yes  Prior Images Obtained and Reviewed:  NA  Required items: Required blood products, implants, devices and special   equipment available    Patient identity confirmed:  Verbally with patient, arm band and   hospital-assigned identification number  NA - No sedation, light sedation, or local anesthesia  Confirmation Checklist:  Patient's identity using two indicators, relevant   allergies, procedure was appropriate and matched the consent or emergent   situation and correct equipment/implants were available  Time out: Immediately prior to the procedure a time out was called    Universal Protocol: the Joint Commission Universal Protocol was followed    Preparation: Patient was prepped and draped in usual sterile fashion       ANESTHESIA    Anesthesia:  Local infiltration  Local Anesthetic:  Lidocaine 1% without epinephrine  Anesthetic Total (mL):  1      SEDATION    Patient Sedated: No        Preparation: skin prepped with 2% chlorhexidine and skin prepped with   ChloraPrep  Skin prep agent: skin prep agent completely dried prior to procedure  Sterile barriers: maximum sterile barriers were used: cap, mask, sterile   gown, sterile gloves, and large sterile sheet  Hand hygiene: hand hygiene performed prior to central venous catheter   insertion  Type of line used: Midline  Catheter type: single lumen  Lumen type: valved  Catheter size: 3 Fr  Brand: Biomass CHP  Lot  number: [id number]  Placement method: MST and ultrasound  Number of attempts: 1  Difficulty threading catheter: no  Successful placement: yes  Orientation: right    Location: basilic vein  Tip Location: distal to axillary vein  Arm circumference: adults 10 cm  Extremity circumference: 24  Visible catheter length: 0  Internal length: 15 cm  Total catheter length: 15  Dressing and securement: chlorhexidine disc applied, statlock and   occlusive dressing applied  Post procedure assessment: blood return through all ports  PROCEDURE   Patient Tolerance:  Patient tolerated the procedure well with no immediate   complicationsDescribe Procedure: RUE midline placed for access.  OK to use   CBC with platelets differential    Narrative    The following orders were created for panel order CBC with platelets differential.  Procedure                               Abnormality         Status                     ---------                               -----------         ------                     CBC with platelets and d...[[id number]]  Abnormal            Final result                 Please view results for these tests on the individual orders.   CBC with platelets and differential   Result Value Ref Range    WBC Count 8.5 4.0 - 11.0 10e3/uL    RBC Count 3.73 (L) 3.80 - 5.20 10e6/uL    Hemoglobin 7.3 (L) 11.7 - 15.7 g/dL    Hematocrit 25.1 (L) 35.0 - 47.0 %    MCV 67 (L) 78 - 100 fL    MCH 19.6 (L) 26.5 - 33.0 pg    MCHC 29.1 (L) 31.5 - 36.5 g/dL    RDW 20.1 (H) 10.0 - 15.0 %    Platelet Count 538 (H) 150 - 450 10e3/uL    % Neutrophils 57 %    % Lymphocytes 31 %    % Monocytes 8 %    % Eosinophils 2 %    % Basophils 1 %    % Immature Granulocytes 1 %    NRBCs per 100 WBC 0 <1 /100    Absolute Neutrophils 4.9 1.6 - 8.3 10e3/uL    Absolute Lymphocytes 2.7 0.8 - 5.3 10e3/uL    Absolute Monocytes 0.7 0.0 - 1.3 10e3/uL    Absolute Eosinophils 0.2 0.0 - 0.7 10e3/uL    Absolute Basophils 0.0 0.0 - 0.2 10e3/uL    Absolute Immature  Granulocytes 0.0 <=0.4 10e3/uL    Absolute NRBCs 0.0 10e3/uL       Meds:    Current Facility-Administered Medications:     acetaminophen (TYLENOL) tablet 650 mg, 650 mg, Oral, Q4H PRN, Adán Restrepo MD    acetaminophen (TYLENOL) tablet 975 mg, 975 mg, Oral, Q8H, Adán Restrepo MD, 975 mg at 02/25/24 0431    bisacodyl (DULCOLAX) suppository 10 mg, 10 mg, Rectal, Daily PRN, Adán Restrepo MD    calcium carbonate (TUMS) chewable tablet 1,000 mg, 1,000 mg, Oral, 4x Daily PRN, Adán Restrepo MD, 1,000 mg at 02/25/24 0510    cefOXitin (MEFOXIN) 2 g vial to attach to  mL bag, 2 g, Intravenous, Q6H, Adán Restrepo MD, 2 g at 02/25/24 0923    doxycycline (VIBRAMYCIN) 100 mg vial to attach to  mL bag, 100 mg, Intravenous, Q12H, Adán Restrepo MD, 100 mg at 02/25/24 0017    famotidine (PEPCID) tablet 20 mg, 20 mg, Oral, BID, 20 mg at 02/25/24 0846 **OR** famotidine (PEPCID) injection 20 mg, 20 mg, Intravenous, BID, Adán Restrepo MD, 20 mg at 02/23/24 0749    HYDROmorphone (DILAUDID) injection 0.2 mg, 0.2 mg, Intravenous, Q2H PRN **OR** HYDROmorphone (DILAUDID) injection 0.4 mg, 0.4 mg, Intravenous, Q2H PRN, Adán Restrepo MD, 0.4 mg at 02/24/24 0609    hydrOXYzine HCl (ATARAX) tablet 25 mg, 25 mg, Oral, Q6H PRN, Adán Restrepo MD, 25 mg at 02/22/24 2239    ibuprofen (ADVIL/MOTRIN) tablet 600 mg, 600 mg, Oral, Q6H PRN, Adán Retsrepo MD    lidocaine (LMX4) cream, , Topical, Q1H PRN, Flora Forbes MD    lidocaine (LMX4) cream, , Topical, Q1H PRN, Adán Restrepo MD    lidocaine 1 % 0.1-1 mL, 0.1-1 mL, Other, Q1H PRN, Flora Forbes MD, 1 mL at 02/24/24 1344    lidocaine 1 % 0.1-1 mL, 0.1-1 mL, Other, Q1H PRN, Adán Restrepo MD    magnesium hydroxide (MILK OF MAGNESIA) suspension 30 mL, 30 mL, Oral, Daily PRN, Adán Restrepo MD    medroxyPROGESTERone (PROVERA) tablet 20 mg, 20 mg, Oral, BID, Flora Forbes MD, 20 mg at 02/25/24 0846    metroNIDAZOLE (FLAGYL) infusion 500 mg, 500 mg, Intravenous, Q12H, Lauro  Adán ISLAS MD, 500 mg at 02/24/24 2255    naloxone (NARCAN) injection 0.2 mg, 0.2 mg, Intravenous, Q2 Min PRN **OR** naloxone (NARCAN) injection 0.4 mg, 0.4 mg, Intravenous, Q2 Min PRN **OR** naloxone (NARCAN) injection 0.2 mg, 0.2 mg, Intramuscular, Q2 Min PRN **OR** naloxone (NARCAN) injection 0.4 mg, 0.4 mg, Intramuscular, Q2 Min PRN, Adán Restrepo MD    ondansetron (ZOFRAN ODT) ODT tab 4 mg, 4 mg, Oral, Q6H PRN **OR** ondansetron (ZOFRAN) injection 4 mg, 4 mg, Intravenous, Q6H PRN, Adán Restrepo MD, 4 mg at 02/24/24 0609    oxyCODONE (ROXICODONE) tablet 5 mg, 5 mg, Oral, Q4H PRN **OR** oxyCODONE (ROXICODONE) tablet 10 mg, 10 mg, Oral, Q4H PRN, Adán Restrepo MD    polyethylene glycol (MIRALAX) Packet 17 g, 17 g, Oral, Daily, Adán Restrepo MD, 17 g at 02/24/24 0818    prochlorperazine (COMPAZINE) injection 10 mg, 10 mg, Intravenous, Q6H PRN, 10 mg at 02/23/24 2046 **OR** prochlorperazine (COMPAZINE) tablet 10 mg, 10 mg, Oral, Q6H PRN, Adán Restrepo MD    senna-docusate (SENOKOT-S/PERICOLACE) 8.6-50 MG per tablet 1 tablet, 1 tablet, Oral, BID, Adán Restrepo MD, 1 tablet at 02/24/24 2004    senna-docusate (SENOKOT-S/PERICOLACE) 8.6-50 MG per tablet 1 tablet, 1 tablet, Oral, BID PRN **OR** senna-docusate (SENOKOT-S/PERICOLACE) 8.6-50 MG per tablet 2 tablet, 2 tablet, Oral, BID PRN, Adán Restrepo MD    sodium chloride (PF) 0.9% PF flush 10 mL, 10 mL, Intracatheter, Q8H, Flora Forbes MD, 10 mL at 02/25/24 0424    sodium chloride (PF) 0.9% PF flush 10-20 mL, 10-20 mL, Intracatheter, q1 min prn, Flora Forbes MD, 20 mL at 02/24/24 1344    sodium chloride (PF) 0.9% PF flush 3 mL, 3 mL, Intracatheter, Q8H, Adán Restrepo MD, 3 mL at 02/24/24 2255    sodium chloride (PF) 0.9% PF flush 3 mL, 3 mL, Intracatheter, q1 min prn, Adán Restrepo MD, 3 mL at 02/23/24 1812      Adán Restrepo MD  [facility] WAYNE Mariscal PA  2/25/2024, 9:27 AM

## 2024-02-25 NOTE — PLAN OF CARE
[id number]  Orientation: a&ox4  Activity: sba  Diet/BS Checks: reg  Tele:  n/a  IV Access/Drains: Midline cath single lumen RA  Pain Management: scheduled tylenol given  Abnormal VS/Results: VSS on RA. WBC 12.5 Hgb 7.8  Bowel/Bladder: cont B/B, multiple loose stools throughout shift  Skin/Wounds: WNL  Consults: IR, OB/GYN  D/C Disposition: pending  Other Info:

## 2024-02-25 NOTE — PLAN OF CARE
[id number]     Summary: presents to the ED with ongoing abdominal and rectal pain after colonoscopy.     Primary Diagnosis:  pelvic infection PID vs TOA.     Orientation: A&Ox4     Activity: SBA     Diet/BS Checks: Reg     Tele:  N/A     IV Access/Drains: R Midline     Pain Management: pelvic pressure/ rectum pain, PRN Dilaudid      Abnormal VS/Results: VSS on RA, Hgb-7.8,      Bowel/Bladder: cont B/B     Skin/Wounds: intact     Consults: IR, OB/Gynaec     D/C Disposition: pending-to apartment w/ friends     Other Info: Denies pain in this shift. Mild vaginal bleeding present. Encouraged to eat, drink and get out of bed. Pt reported dizziness after walk, VSS.

## 2024-02-26 ENCOUNTER — APPOINTMENT (OUTPATIENT)
Dept: ULTRASOUND IMAGING | Facility: CLINIC | Age: 29
DRG: 758 | End: 2024-02-26
Attending: OBSTETRICS & GYNECOLOGY
Payer: COMMERCIAL

## 2024-02-26 PROBLEM — I82.621: Status: ACTIVE | Noted: 2024-02-26

## 2024-02-26 LAB
BASOPHILS # BLD AUTO: 0 10E3/UL (ref 0–0.2)
BASOPHILS NFR BLD AUTO: 0 %
EOSINOPHIL # BLD AUTO: 0.1 10E3/UL (ref 0–0.7)
EOSINOPHIL NFR BLD AUTO: 1 %
ERYTHROCYTE [DISTWIDTH] IN BLOOD BY AUTOMATED COUNT: 20.2 % (ref 10–15)
FERRITIN SERPL-MCNC: 51 NG/ML (ref 6–175)
HCT VFR BLD AUTO: 25.3 % (ref 35–47)
HGB BLD-MCNC: 7.5 G/DL (ref 11.7–15.7)
IMM GRANULOCYTES # BLD: 0.1 10E3/UL
IMM GRANULOCYTES NFR BLD: 1 %
LYMPHOCYTES # BLD AUTO: 2.5 10E3/UL (ref 0.8–5.3)
LYMPHOCYTES NFR BLD AUTO: 26 %
MCH RBC QN AUTO: 20 PG (ref 26.5–33)
MCHC RBC AUTO-ENTMCNC: 29.6 G/DL (ref 31.5–36.5)
MCV RBC AUTO: 68 FL (ref 78–100)
MONOCYTES # BLD AUTO: 0.8 10E3/UL (ref 0–1.3)
MONOCYTES NFR BLD AUTO: 8 %
NEUTROPHILS # BLD AUTO: 6.2 10E3/UL (ref 1.6–8.3)
NEUTROPHILS NFR BLD AUTO: 64 %
NRBC # BLD AUTO: 0 10E3/UL
NRBC BLD AUTO-RTO: 0 /100
PLATELET # BLD AUTO: 623 10E3/UL (ref 150–450)
RADIOLOGIST FLAGS: ABNORMAL
RBC # BLD AUTO: 3.75 10E6/UL (ref 3.8–5.2)
WBC # BLD AUTO: 9.7 10E3/UL (ref 4–11)

## 2024-02-26 PROCEDURE — 250N000013 HC RX MED GY IP 250 OP 250 PS 637: Performed by: STUDENT IN AN ORGANIZED HEALTH CARE EDUCATION/TRAINING PROGRAM

## 2024-02-26 PROCEDURE — 258N000003 HC RX IP 258 OP 636: Performed by: OBSTETRICS & GYNECOLOGY

## 2024-02-26 PROCEDURE — 82728 ASSAY OF FERRITIN: CPT | Performed by: OBSTETRICS & GYNECOLOGY

## 2024-02-26 PROCEDURE — 120N000001 HC R&B MED SURG/OB

## 2024-02-26 PROCEDURE — 85049 AUTOMATED PLATELET COUNT: CPT | Performed by: STUDENT IN AN ORGANIZED HEALTH CARE EDUCATION/TRAINING PROGRAM

## 2024-02-26 PROCEDURE — 99222 1ST HOSP IP/OBS MODERATE 55: CPT | Mod: AI | Performed by: PHYSICIAN ASSISTANT

## 2024-02-26 PROCEDURE — 250N000011 HC RX IP 250 OP 636: Performed by: OBSTETRICS & GYNECOLOGY

## 2024-02-26 PROCEDURE — 250N000011 HC RX IP 250 OP 636: Performed by: PHYSICIAN ASSISTANT

## 2024-02-26 PROCEDURE — 93971 EXTREMITY STUDY: CPT | Mod: RT

## 2024-02-26 PROCEDURE — 250N000013 HC RX MED GY IP 250 OP 250 PS 637: Performed by: OBSTETRICS & GYNECOLOGY

## 2024-02-26 PROCEDURE — 999N000128 HC STATISTIC PERIPHERAL IV START W/O US GUIDANCE

## 2024-02-26 PROCEDURE — 36415 COLL VENOUS BLD VENIPUNCTURE: CPT | Performed by: STUDENT IN AN ORGANIZED HEALTH CARE EDUCATION/TRAINING PROGRAM

## 2024-02-26 RX ORDER — DIPHENHYDRAMINE HYDROCHLORIDE 50 MG/ML
50 INJECTION INTRAMUSCULAR; INTRAVENOUS
Status: DISCONTINUED | OUTPATIENT
Start: 2024-02-26 | End: 2024-02-27 | Stop reason: HOSPADM

## 2024-02-26 RX ORDER — METRONIDAZOLE 500 MG/1
500 TABLET ORAL 2 TIMES DAILY
Status: DISCONTINUED | OUTPATIENT
Start: 2024-02-26 | End: 2024-02-27 | Stop reason: HOSPADM

## 2024-02-26 RX ORDER — LEVOFLOXACIN 500 MG/1
500 TABLET, FILM COATED ORAL DAILY
Status: DISCONTINUED | OUTPATIENT
Start: 2024-02-26 | End: 2024-02-27 | Stop reason: HOSPADM

## 2024-02-26 RX ORDER — ENOXAPARIN SODIUM 100 MG/ML
1 INJECTION SUBCUTANEOUS EVERY 12 HOURS
Status: DISCONTINUED | OUTPATIENT
Start: 2024-02-26 | End: 2024-02-27

## 2024-02-26 RX ORDER — METHYLPREDNISOLONE SODIUM SUCCINATE 125 MG/2ML
125 INJECTION, POWDER, LYOPHILIZED, FOR SOLUTION INTRAMUSCULAR; INTRAVENOUS
Status: DISCONTINUED | OUTPATIENT
Start: 2024-02-26 | End: 2024-02-27 | Stop reason: HOSPADM

## 2024-02-26 RX ADMIN — FAMOTIDINE 20 MG: 20 TABLET ORAL at 08:30

## 2024-02-26 RX ADMIN — DOCUSATE SODIUM 50 MG AND SENNOSIDES 8.6 MG 1 TABLET: 8.6; 5 TABLET, FILM COATED ORAL at 20:25

## 2024-02-26 RX ADMIN — LEVOFLOXACIN 500 MG: 500 TABLET, FILM COATED ORAL at 13:14

## 2024-02-26 RX ADMIN — MEDROXYPROGESTERONE ACETATE 20 MG: 10 TABLET ORAL at 20:25

## 2024-02-26 RX ADMIN — METRONIDAZOLE 500 MG: 500 TABLET ORAL at 13:14

## 2024-02-26 RX ADMIN — IRON SUCROSE 300 MG: 20 INJECTION, SOLUTION INTRAVENOUS at 13:51

## 2024-02-26 RX ADMIN — DOCUSATE SODIUM 50 MG AND SENNOSIDES 8.6 MG 1 TABLET: 8.6; 5 TABLET, FILM COATED ORAL at 08:33

## 2024-02-26 RX ADMIN — CEFOXITIN SODIUM 2 G: 2 POWDER, FOR SOLUTION INTRAVENOUS at 10:42

## 2024-02-26 RX ADMIN — MEDROXYPROGESTERONE ACETATE 20 MG: 10 TABLET ORAL at 08:30

## 2024-02-26 RX ADMIN — FAMOTIDINE 20 MG: 20 TABLET ORAL at 20:25

## 2024-02-26 RX ADMIN — METRONIDAZOLE 500 MG: 500 TABLET ORAL at 20:25

## 2024-02-26 RX ADMIN — ENOXAPARIN SODIUM 60 MG: 60 INJECTION SUBCUTANEOUS at 19:06

## 2024-02-26 RX ADMIN — CEFOXITIN SODIUM 2 G: 2 POWDER, FOR SOLUTION INTRAVENOUS at 04:50

## 2024-02-26 RX ADMIN — DOXYCYCLINE 100 MG: 100 INJECTION, POWDER, LYOPHILIZED, FOR SOLUTION INTRAVENOUS at 00:44

## 2024-02-26 RX ADMIN — ACETAMINOPHEN 650 MG: 325 TABLET, FILM COATED ORAL at 20:27

## 2024-02-26 ASSESSMENT — ACTIVITIES OF DAILY LIVING (ADL)
ADLS_ACUITY_SCORE: 21
ADLS_ACUITY_SCORE: 23
ADLS_ACUITY_SCORE: 21
ADLS_ACUITY_SCORE: 21
ADLS_ACUITY_SCORE: 23
ADLS_ACUITY_SCORE: 21
ADLS_ACUITY_SCORE: 23
ADLS_ACUITY_SCORE: 23
ADLS_ACUITY_SCORE: 21
ADLS_ACUITY_SCORE: 23

## 2024-02-26 NOTE — CONSULTS
[facility]  Consult Note - Hospitalist Service     Date of Admission:  2/22/2024  Consult Requested by: OB/GYN  Reason for Consult: new DVT  PRIMARY CARE PROVIDER:    Varghese Mariscal, Ob/Gyn, PA    Assessment & Plan   Danny Villareal is a 28 year old female with PMH of anemia, endometriosis, fibroids who presented to the ED on 2/22/24 for evaluation of pelvic pain.     She was evaluated in January for heavy VB and found to have endometrial polyps and underwent hysteroscopic removal of endometrial polyps without complication. Afterwards, had irregular and abnormal uterine bleeding managed with progesterone and OCP without resolution. She then developed heavy VB, N/V, severe abdominal pain on 2/10 resulting in admission 2/13 for endometritis and treated with antibiotics and started on Provera taper for bleeding control, discharged home with outpatient management 2/14. She had colonoscopy by [facility] on 2/21 with recurrence of abdominal pain, nausea, abnormal vaginal bleeding.     Presented to ED 2/22 where she had imaging showing rim-enhancing fluid collections adjacent to the uterus (favoring abscess formation) as well as a discrete one measuring 5.0 x 3.3 x 2.6 cm. She was admitted to OB/GYN service 2/22/24 and started on Mefoxin and Doxycycline for PID and TOA. IR was consulted 2/23 for possible drainage of fluid collection in posterior cul de sac, unable to access area due to surrounding bowel. OB/GYN plan to continue IV abx with goal to avoid surgery if possible. Patient had RUE midline placed on 2/24. Patient is clinically improving from infection standpoint and was transitioned to oral antibiotics today. She was noted to have RUE swelling and pain which prompted venous US showing an acute DVT. Hospitalist service was consulted for management of acute DVT.    Acute provoked RUE DVT  *Patient has had multiple IV lines in the RUE as well as a midline placed 2/24  *Venous US RUE 2/26 shows DVT  in the RUE in the segments of both brachial veins as well as occlusive segment of the axillary vein. Superficial thrombophlebitis int he basilic vein of the upper arm and cephalic vein in the distal humerus to the wrist.  *No plans for surgical intervention per OB/GYN  *She has no complaints of chest pain or dyspnea, no LE swelling or calf pain  -Start full dose Lovenox tonight, likely transition to DOAC tomorrow prior to discharge  -Pharmacy consult for DOAC cost  -Likely plan for 3 month course of anticoagulation, will need referral to PCP    Pelvic infection PID vs TOA  *IR consulted 2/23 but unable to access fluid collection in posterior cul de sac due to surrounding bowel  *Goal of avoiding surgery by treating with abx but if increase in WBC and worsening imaging may need to consider diagnostic laparoscopy. Per OB/GYN 2/26 patient is clinically improving, possibly medically stable for discharge 2/27  *She was placed on IV Mefoxin and Doxycycline on admission, and switched to PO Levofloxacin and Flagyl today with plan for 14 day course by OB/GYN  -Continue abx per primary service  -Pain control per primary service    Abnormal vaginal bleeding  *Currently being managed by primary team, OB/GYN  *Patient reports only small amount of vaginal bleeding at this time  -Continue Provera 20 mg BID    Anemia  *Acute on chronic blood loss anemia due to vaginal bleeding  *Hgb has been stable around 7.5  *S/p IV Venofer 2/26  -Follow H&H    Clinically Significant Risk Factors                                    Diet: Regular Diet Adult     DVT Prophylaxis: Enoxaparin (Lovenox) SQ   Hernandez Catheter: Not present  Lines: None     Cardiac Monitoring: None  Code Status: Full Code      Disposition Plan       Expected Discharge Date: 02/27/2024              Entered: Ana Cruz PA-C 02/26/2024, 5:01 PM        The patient's care was discussed with the Attending Physician, Dr. Luz and Patient.    The patient has been discussed  with Dr. Luz, who agrees with the assessment and plan at this time.    At this time, I'd like to thank OB/GYN for consulting the Hospitalist service.  We will continue to follow.      Ana Cruz PA-C  [facility]  Securely message with the Vocera Web Console (learn more here)  ______________________________________________________________________    Chief Complaint   RUE pain    History is obtained from the patient and EMR.    History of Present Illness   Danny Villareal is a 28 year old female with PMH of anemia, endometriosis, fibroids who presented to the ED on 2/22/24 for evaluation of pelvic pain.     She was evaluated in January for heavy VB and found to have endometrial polyps and underwent hysteroscopic removal of endometrial polyps without complication. Afterwards, had irregular and abnormal uterine bleeding managed with progesterone and OCP without resolution. She then developed heavy VB, N/V, severe abdominal pain on 2/10 resulting in admission 2/13 for endometritis and treated with antibiotics and started on Provera taper for bleeding control, discharged home with outpatient management 2/14. She had colonoscopy by [facility] on 2/21 with recurrence of abdominal pain, nausea, abnormal vaginal bleeding.     Presented to ED 2/22 where she had imaging showing rim-enhancing fluid collections adjacent to the uterus (favoring abscess formation) as well as a discrete one measuring 5.0 x 3.3 x 2.6 cm. She was admitted to OB/GYN service 2/22/24 and started on Mefoxin and Doxycycline for PID and TOA. IR was consulted 2/23 for possible drainage of fluid collection in posterior cul de sac, unable to access area due to surrounding bowel. OB/GYN plan to continue IV abx with goal to avoid surgery if possible. Patient had RUE midline placed on 2/24. Patient is clinically improving from infection standpoint and was transitioned to oral antibiotics today. She was noted to have RUE swelling  and pain which prompted venous US showing an acute DVT. Hospitalist service was consulted for management of acute DVT.    She has had RUE swelling and pain over the last day or so. No chest pain, dyspnea, LE swelling or calf pain. She states she has no abdominal pain currently and no nausea or vomiting. Only small amount of vaginal bleeding, nothing severe.    Past Medical History    I have reviewed this patient's medical history and updated it with pertinent information if needed.   Past Medical History:   Diagnosis Date    Anemia     Endometriosis     Fibroid uterus      Medications   Prior to Admission Medications   Prescriptions Last Dose Informant Patient Reported? Taking?   doxycycline hyclate (VIBRAMYCIN) 100 MG capsule 2/20/2024 Self No Yes   Sig: Take 1 capsule (100 mg) by mouth every 12 hours   ibuprofen (ADVIL/MOTRIN) 200 MG tablet 2/22/2024 at PRN Self Yes Yes   Sig: Take 200-600 mg by mouth every 6 hours as needed for pain   medroxyPROGESTERone (PROVERA) 10 MG tablet 2/21/2024 at PM Self No Yes   Sig: Take 2 tablets (20 mg) by mouth 3 times daily   metroNIDAZOLE (FLAGYL) 500 MG tablet 2/20/2024 Self No Yes   Sig: Take 1 tablet (500 mg) by mouth 2 times daily for 14 days   ondansetron (ZOFRAN ODT) 4 MG ODT tab Past Week at PRN Self No Yes   Sig: Take 1 tablet (4 mg) by mouth every 8 hours as needed for nausea   simethicone (MYLICON) 125 MG chewable tablet Past Week at PRN Self No Yes   Sig: Take 1 tablet (125 mg) by mouth 2 times daily      Facility-Administered Medications: None     Allergies   No Known Allergies    Physical Exam   Vital Signs: Temp: 99.2  F (37.3  C) Temp src: Oral BP: 111/72 Pulse: 117   Resp: 16 SpO2: 99 % O2 Device: None (Room air)    Weight: 139 lbs 0 oz    Constitutional: Awake, alert, cooperative, no apparent distress.    ENT: Normocephalic, without obvious abnormality, atraumatic. Eye pupils are equal. Normal sclera.    Neck: Supple, symmetrical, trachea midline  Pulmonary: No  increased work of breathing, good air exchange, clear to auscultation bilaterally  Cardiovascular: tachycardia, regular rhythm  GI: Normal bowel sounds, soft, non-distended, non-tender.   Skin: Visualized skin appeared clear.  Neuro: Oriented x 3. Moves all extremities spontaneously. No focal neuro deficits.  Psych:  Normal affect and mood  Extremities: Normal muscle tone. No gross deformities noted. Calves non-tender b/l. No pitting edema b/l LE. RUE swelling and pain especially in the axilla.    Medical Decision Making       60 MINUTES SPENT BY ME on the date of service doing chart review, history, exam, documentation & further activities per the note.         Data   Results for orders placed or performed during the hospital encounter of 02/22/24 (from the past 24 hour(s))   CBC with platelets differential    Narrative    The following orders were created for panel order CBC with platelets differential.  Procedure                               Abnormality         Status                     ---------                               -----------         ------                     CBC with platelets and d...[[id number]]  Abnormal            Final result                 Please view results for these tests on the individual orders.   Ferritin   Result Value Ref Range    Ferritin 51 6 - 175 ng/mL   CBC with platelets and differential   Result Value Ref Range    WBC Count 9.7 4.0 - 11.0 10e3/uL    RBC Count 3.75 (L) 3.80 - 5.20 10e6/uL    Hemoglobin 7.5 (L) 11.7 - 15.7 g/dL    Hematocrit 25.3 (L) 35.0 - 47.0 %    MCV 68 (L) 78 - 100 fL    MCH 20.0 (L) 26.5 - 33.0 pg    MCHC 29.6 (L) 31.5 - 36.5 g/dL    RDW 20.2 (H) 10.0 - 15.0 %    Platelet Count 623 (H) 150 - 450 10e3/uL    % Neutrophils 64 %    % Lymphocytes 26 %    % Monocytes 8 %    % Eosinophils 1 %    % Basophils 0 %    % Immature Granulocytes 1 %    NRBCs per 100 WBC 0 <1 /100    Absolute Neutrophils 6.2 1.6 - 8.3 10e3/uL    Absolute Lymphocytes 2.5 0.8 - 5.3 10e3/uL     Absolute Monocytes 0.8 0.0 - 1.3 10e3/uL    Absolute Eosinophils 0.1 0.0 - 0.7 10e3/uL    Absolute Basophils 0.0 0.0 - 0.2 10e3/uL    Absolute Immature Granulocytes 0.1 <=0.4 10e3/uL    Absolute NRBCs 0.0 10e3/uL   US Upper Extremity Venous Duplex Right   Result Value Ref Range    Radiologist flags DVT in the right upper extremity (AA)     Narrative    US UPPER EXTREMITY VENOUS DUPLEX RIGHT  2/26/2024 4:09 PM     HISTORY: RUE swelling and pain with midline. r/o dvt    COMPARISON: None.    TECHNIQUE: Color Doppler and spectral waveform analysis performed  throughout the right upper extremity.    FINDINGS: The right internal jugular vein and subclavian vein segments  are patent. Thrombus noted in the brachial veins, nonocclusive in the  more proximal segment, though more occlusive in the brachial vein in  the more distal segment of the upper arm. Occlusive thrombus also  noted in the axillary vein in the axilla. Occlusive thrombus noted in  the cephalic vein in the distal humerus to the wrist. Nonocclusive  thrombus in the basilic vein of the upper arm. Contralateral left  internal jugular and subclavian veins are patent.      Impression    IMPRESSION:  1. Positive for DVT in the right upper extremity in the segments of  both brachial veins as well as occlusive segment of the axillary vein.  2. Superficial thrombophlebitis in the basilic vein of the upper arm  and cephalic vein in the distal humerus to the wrist.    [Critical Result: DVT in the right upper extremity]    Finding was identified on 2/26/2024 4:21 PM.     Sarah was contacted by me on 2/26/2024 4:34 PM and verbalized  understanding of the critical result.     GREG PAEZ MD         SYSTEM ID:  [id number]

## 2024-02-26 NOTE — PLAN OF CARE
[id number]  Orientation: a&ox4  Activity: SBA  Diet/BS Checks: reg  Tele:  n/a  IV Access/Drains: midline R arm   Pain Management: PRN tylenol given x1 for abdominal pain  Abnormal VS/Results: VSS on RA. WBC 8.5. Hgb 7.3  Bowel/Bladder: cont B/B. Loose stools yesterday  Skin/Wounds: WNL  Consults: OB/GYN  D/C Disposition: possible discharge 2/27 after transition to oral antibiotics   Other Info: zofran given x1 effective. CT scan today

## 2024-02-26 NOTE — PROVIDER NOTIFICATION
MD Notification    Notified Person: MD Crawford    Notified Person Name:    Notification Date/Time: 2/26/2024  .4:39 PM      Notification Interaction:    Purpose of Notification: DVT noted on RUE US    Orders Received: will begin lovenox, watch for orders    Comments:

## 2024-02-26 NOTE — PROGRESS NOTES
Gynecology Daily Progress Note    Patient name: Danny Villareal  : 1995  Admit date: 2024  MRN: [id number]  Acct: [id number]      Assessment/Plan:  Danny Villareal is a 28y female who admitted for pelvic pain, pelvic infection PID vs TOA, AUB. Today is HD#5.    PID vs TOA (vs GI etiology):  - Received IV Zosyn in ED initially for broad spectrum coverage. Tailored at admission to Mefoxin 2g IV q6hr and Doxy 100mg IV q12hr as that is a recommended first line regimen for PID and for TOA (Mefoxin is q6h for PID and q12hr for TOA so elected to use q6hr freq). WBC noted to have increased with left-shift so Flagyl 500mg q12hr added as well, and has subsequently normalized. Repeat pending this AM.   - s/p IR consultation for possible drainage of fluid collection in posterior cul de sac but they were unable to access the area due to surrounding bowel.  - Transition to oral antibiotics today - levofloxacin 500 mg daily, and metronidazole 500 mg BID for 14 days.   - Pt strongly desires to avoid surgery (see prior notes and details regarding Dr. Forbes's FaceTime with pt's family in [facility]). D/w pt if continued lack of improvement, would need to perform diagnostic and therapeutic laparoscopic pelvic washout and would likely try to coordinate this with Gen Surg or Colorectal Surg assistance available in even of GI etiology given pt's reported prior hx.  - Pain: ibuprofen PO, Tylenol PO, Oxycodone PO, and Dilaudid IV for breakthrough. Pt over the last 36hr now down to just Tylenol for pain control, finally not having severe pain episodes  - Discussed option for repeat imaging - at this point given clinical improvement, and plan to avoid surgery, repeat imaging would not add to our clinical decision making. Will hold off for now. Consider US in clinic at follow up or if clinical scenario worsens.     - Access: after 4th PIV and pain, pt received PICC yesterday for better access. Cont to use, working well  - Diet:  regular  - Activity: up at [facility]  - VTE ppx: SCDs when not ambulating  - Vitals: per routine  - Full code  - Dispo: to remain inpatient for 24hr on PO meds (vs 12hr), so most likely would be discharged home tomorrow morning. Pt aware and agrees with the plan.    AUB, subsequent anemia:  - Has been an ongoing issue post-op for ~1mo now  - Provera increased back to 20mg PO BID for bleeding control, likely is related to the infection causing issues. Hopeful this will improve/resolve once the infection is finally managed  - plan to transition to [facility] as outpatient.   - add IV Venofir today  - Can take PO iron after discharge as well    Follow up in clinic this week for repeat US.       Subjective:  This AM, pt notes her pain is finally much improved and no longer needing narcotics. She has been tolerating the IV abx well and expresses grateful finally improving but worries about recurrence of this and how we can ensure fully treated/resolved for her. No more N/V and is tolerating a regular diet. She notes no new acute concerns this AM.    ROS:  Constitutional: denies fevers  Cardiovascular: denies chest pain  Respiratory: denies dyspnea  Gastrointestinal: negative for abdominal pain today  Neuro: denies headaches, vision changes  Gyn: see HPI  MSK: denies calf pain      Objective:  Vitals:  Temp:  [98.2  F (36.8  C)-98.4  F (36.9  C)] 98.3  F (36.8  C)  Pulse:  [] 100  Resp:  [18-19] 19  BP: (101-115)/(72-80) 101/72  SpO2:  [99 %-100 %] 100 %    Exam:  General: no acute distress  Cardiovascular: pulses intact, no peripheral edema  Pulmonary: no respiratory difficulty, normal non-labored breathing  Abdomen: soft, non-tender to palpation, no rebound or guarding  Psych: mood appropriate      Labs/Imaging:  Results for orders placed or performed during the hospital encounter of 02/22/24 (from the past 24 hour(s))   CBC with platelets differential    Narrative    The following orders were created for panel order CBC  with platelets differential.  Procedure                               Abnormality         Status                     ---------                               -----------         ------                     CBC with platelets and d...[[id number]]                                                   Please view results for these tests on the individual orders.       Meds:    Current Facility-Administered Medications:      acetaminophen (TYLENOL) tablet 650 mg, 650 mg, Oral, Q4H PRN, Adán Restrepo MD, 650 mg at 02/25/24 2316     bisacodyl (DULCOLAX) suppository 10 mg, 10 mg, Rectal, Daily PRN, Adán Restrepo MD     calcium carbonate (TUMS) chewable tablet 1,000 mg, 1,000 mg, Oral, 4x Daily PRN, Adán Restrepo MD, 1,000 mg at 02/25/24 0510     cefOXitin (MEFOXIN) 2 g vial to attach to  mL bag, 2 g, Intravenous, Q6H, Adán Restrepo MD, Last Rate: 200 mL/hr at 02/25/24 2231, 2 g at 02/26/24 0450     diphenhydrAMINE (BENADRYL) injection 50 mg, 50 mg, Intravenous, Once PRN, Mona Crawford MD     doxycycline (VIBRAMYCIN) 100 mg vial to attach to  mL bag, 100 mg, Intravenous, Q12H, Adán Restrepo MD, 100 mg at 02/26/24 0044     EPINEPHrine (ADRENALIN) kit 0.3 mg, 0.3 mg, Intramuscular, Q3 Min PRN, Mona Crawford MD     famotidine (PEPCID) injection 20 mg, 20 mg, Intravenous, Once PRN, Mona Crawford MD     famotidine (PEPCID) tablet 20 mg, 20 mg, Oral, BID, 20 mg at 02/26/24 0830 **OR** famotidine (PEPCID) injection 20 mg, 20 mg, Intravenous, BID, Adán Restrepo MD, 20 mg at 02/23/24 0749     HYDROmorphone (DILAUDID) injection 0.2 mg, 0.2 mg, Intravenous, Q2H PRN **OR** HYDROmorphone (DILAUDID) injection 0.4 mg, 0.4 mg, Intravenous, Q2H PRN, Adán Restrepo MD, 0.4 mg at 02/24/24 0609     hydrOXYzine HCl (ATARAX) tablet 25 mg, 25 mg, Oral, Q6H PRN, Adán Restrepo MD, 25 mg at 02/22/24 2239     ibuprofen (ADVIL/MOTRIN) tablet 600 mg, 600 mg, Oral, Q6H PRN, Adán Restrepo MD     iron sucrose (VENOFER) 300  mg in sodium chloride 0.9 % 265 mL intermittent infusion, 300 mg, Intravenous, Q72H, Mona Crawford MD     lidocaine (LMX4) cream, , Topical, Q1H PRN, Flora Forbes MD     lidocaine (LMX4) cream, , Topical, Q1H PRN, Adán Restrepo MD     lidocaine 1 % 0.1-1 mL, 0.1-1 mL, Other, Q1H PRN, Flora Forbes MD, 1 mL at 02/24/24 1344     lidocaine 1 % 0.1-1 mL, 0.1-1 mL, Other, Q1H PRN, Adán Restrepo MD     magnesium hydroxide (MILK OF MAGNESIA) suspension 30 mL, 30 mL, Oral, Daily PRN, Adán Restrepo MD     medroxyPROGESTERone (PROVERA) tablet 20 mg, 20 mg, Oral, BID, Flora Forbes MD, 20 mg at 02/26/24 0830     methylPREDNISolone sodium succinate (solu-MEDROL) injection 125 mg, 125 mg, Intravenous, Once PRN, Mona Crawford MD     metroNIDAZOLE (FLAGYL) infusion 500 mg, 500 mg, Intravenous, Q12H, Adán Restrepo MD, 500 mg at 02/25/24 2317     naloxone (NARCAN) injection 0.2 mg, 0.2 mg, Intravenous, Q2 Min PRN **OR** naloxone (NARCAN) injection 0.4 mg, 0.4 mg, Intravenous, Q2 Min PRN **OR** naloxone (NARCAN) injection 0.2 mg, 0.2 mg, Intramuscular, Q2 Min PRN **OR** naloxone (NARCAN) injection 0.4 mg, 0.4 mg, Intramuscular, Q2 Min PRN, Adán Restrepo MD     ondansetron (ZOFRAN ODT) ODT tab 4 mg, 4 mg, Oral, Q6H PRN **OR** ondansetron (ZOFRAN) injection 4 mg, 4 mg, Intravenous, Q6H PRN, Adán Restrepo MD, 4 mg at 02/25/24 2116     oxyCODONE (ROXICODONE) tablet 5 mg, 5 mg, Oral, Q4H PRN **OR** oxyCODONE (ROXICODONE) tablet 10 mg, 10 mg, Oral, Q4H PRN, Adán Restrepo MD     polyethylene glycol (MIRALAX) Packet 17 g, 17 g, Oral, Daily, Adán Restrepo MD, 17 g at 02/24/24 0818     prochlorperazine (COMPAZINE) injection 10 mg, 10 mg, Intravenous, Q6H PRN, 10 mg at 02/23/24 2046 **OR** prochlorperazine (COMPAZINE) tablet 10 mg, 10 mg, Oral, Q6H PRN, Adán Restrepo MD     senna-docusate (SENOKOT-S/PERICOLACE) 8.6-50 MG per tablet 1 tablet, 1 tablet, Oral, BID, Adán Restrepo MD, 1 tablet at 02/26/24 0833      senna-docusate (SENOKOT-S/PERICOLACE) 8.6-50 MG per tablet 1 tablet, 1 tablet, Oral, BID PRN **OR** senna-docusate (SENOKOT-S/PERICOLACE) 8.6-50 MG per tablet 2 tablet, 2 tablet, Oral, BID PRN, Adán Restrepo MD     sodium chloride (PF) 0.9% PF flush 10 mL, 10 mL, Intracatheter, Q8H, Flora Forbes MD, 10 mL at 02/26/24 0449     sodium chloride (PF) 0.9% PF flush 10-20 mL, 10-20 mL, Intracatheter, q1 min prn, Flora Forbes MD, 20 mL at 02/24/24 1344     sodium chloride (PF) 0.9% PF flush 3 mL, 3 mL, Intracatheter, Q8H, Adán Restrepo MD, 3 mL at 02/26/24 0449     sodium chloride (PF) 0.9% PF flush 3 mL, 3 mL, Intracatheter, q1 min prn, Adán Restrepo MD, 3 mL at 02/23/24 1812      Mona Crawford MD  She/Her  [phone]  02/26/24 9:44 AM

## 2024-02-26 NOTE — PLAN OF CARE
02/[id number]/[id number]     Summary: presents to the ED with ongoing abdominal and rectal pain after colonoscopy.     Primary Diagnosis:  pelvic infection PID vs TOA.     Orientation: A&Ox4     Activity: SBA     Diet/BS Checks: Reg     Tele:  N/A     IV Access/Drains: R Midline-No blood return     Pain Management: denies pain     Abnormal VS/Results: VSS on RA, Hgb-7.3,      Bowel/Bladder: cont B/B     Skin/Wounds: intact     Consults:  OB/GYN     D/C Disposition: pending-to apartment w/ friends     Other Info: Denies pain in this shift. Mild vaginal bleeding present. Encouraged to eat, drink and get out of bed. Walked along the hallway once. Pt had 4 small greenish loose stools in this shift. Plan for CT scan tomorrow.

## 2024-02-27 VITALS
HEART RATE: 87 BPM | BODY MASS INDEX: 22.34 KG/M2 | WEIGHT: 139 LBS | OXYGEN SATURATION: 100 % | RESPIRATION RATE: 18 BRPM | SYSTOLIC BLOOD PRESSURE: 108 MMHG | HEIGHT: 66 IN | DIASTOLIC BLOOD PRESSURE: 76 MMHG | TEMPERATURE: 98.4 F

## 2024-02-27 LAB
ANION GAP SERPL CALCULATED.3IONS-SCNC: 11 MMOL/L (ref 7–15)
BACTERIA BLD CULT: NO GROWTH
BACTERIA BLD CULT: NO GROWTH
BASOPHILS # BLD AUTO: 0.1 10E3/UL (ref 0–0.2)
BASOPHILS NFR BLD AUTO: 1 %
BUN SERPL-MCNC: 3 MG/DL (ref 6–20)
CALCIUM SERPL-MCNC: 8.9 MG/DL (ref 8.6–10)
CHLORIDE SERPL-SCNC: 104 MMOL/L (ref 98–107)
CREAT SERPL-MCNC: 0.5 MG/DL (ref 0.51–0.95)
DEPRECATED HCO3 PLAS-SCNC: 24 MMOL/L (ref 22–29)
EGFRCR SERPLBLD CKD-EPI 2021: >90 ML/MIN/1.73M2
EOSINOPHIL # BLD AUTO: 0.1 10E3/UL (ref 0–0.7)
EOSINOPHIL NFR BLD AUTO: 2 %
ERYTHROCYTE [DISTWIDTH] IN BLOOD BY AUTOMATED COUNT: 20.6 % (ref 10–15)
GLUCOSE BLDC GLUCOMTR-MCNC: 115 MG/DL (ref 70–99)
GLUCOSE SERPL-MCNC: 102 MG/DL (ref 70–99)
HCT VFR BLD AUTO: 26.6 % (ref 35–47)
HGB BLD-MCNC: 7.8 G/DL (ref 11.7–15.7)
IMM GRANULOCYTES # BLD: 0 10E3/UL
IMM GRANULOCYTES NFR BLD: 0 %
LYMPHOCYTES # BLD AUTO: 1.9 10E3/UL (ref 0.8–5.3)
LYMPHOCYTES NFR BLD AUTO: 27 %
MCH RBC QN AUTO: 19.8 PG (ref 26.5–33)
MCHC RBC AUTO-ENTMCNC: 29.3 G/DL (ref 31.5–36.5)
MCV RBC AUTO: 68 FL (ref 78–100)
MONOCYTES # BLD AUTO: 0.7 10E3/UL (ref 0–1.3)
MONOCYTES NFR BLD AUTO: 9 %
NEUTROPHILS # BLD AUTO: 4.4 10E3/UL (ref 1.6–8.3)
NEUTROPHILS NFR BLD AUTO: 61 %
NRBC # BLD AUTO: 0 10E3/UL
NRBC BLD AUTO-RTO: 0 /100
PLATELET # BLD AUTO: 664 10E3/UL (ref 150–450)
POTASSIUM SERPL-SCNC: 3.7 MMOL/L (ref 3.4–5.3)
RBC # BLD AUTO: 3.93 10E6/UL (ref 3.8–5.2)
SODIUM SERPL-SCNC: 139 MMOL/L (ref 135–145)
WBC # BLD AUTO: 7.2 10E3/UL (ref 4–11)

## 2024-02-27 PROCEDURE — 250N000013 HC RX MED GY IP 250 OP 250 PS 637: Performed by: OBSTETRICS & GYNECOLOGY

## 2024-02-27 PROCEDURE — 85025 COMPLETE CBC W/AUTO DIFF WBC: CPT | Performed by: STUDENT IN AN ORGANIZED HEALTH CARE EDUCATION/TRAINING PROGRAM

## 2024-02-27 PROCEDURE — 36415 COLL VENOUS BLD VENIPUNCTURE: CPT | Performed by: STUDENT IN AN ORGANIZED HEALTH CARE EDUCATION/TRAINING PROGRAM

## 2024-02-27 PROCEDURE — 250N000011 HC RX IP 250 OP 636: Performed by: PHYSICIAN ASSISTANT

## 2024-02-27 PROCEDURE — 250N000013 HC RX MED GY IP 250 OP 250 PS 637: Performed by: STUDENT IN AN ORGANIZED HEALTH CARE EDUCATION/TRAINING PROGRAM

## 2024-02-27 PROCEDURE — 80048 BASIC METABOLIC PNL TOTAL CA: CPT | Performed by: PHYSICIAN ASSISTANT

## 2024-02-27 PROCEDURE — 99232 SBSQ HOSP IP/OBS MODERATE 35: CPT | Performed by: HOSPITALIST

## 2024-02-27 RX ORDER — METRONIDAZOLE 500 MG/1
500 TABLET ORAL 2 TIMES DAILY
Qty: 18 TABLET | Refills: 0 | Status: SHIPPED | OUTPATIENT
Start: 2024-02-27 | End: 2024-03-07

## 2024-02-27 RX ORDER — LEVOFLOXACIN 500 MG/1
500 TABLET, FILM COATED ORAL DAILY
Qty: 9 TABLET | Refills: 0 | Status: SHIPPED | OUTPATIENT
Start: 2024-02-28 | End: 2024-03-08

## 2024-02-27 RX ORDER — MEDROXYPROGESTERONE ACETATE 10 MG
20 TABLET ORAL 2 TIMES DAILY
Qty: 120 TABLET | Refills: 1 | Status: SHIPPED | OUTPATIENT
Start: 2024-02-27 | End: 2024-04-27

## 2024-02-27 RX ADMIN — DOCUSATE SODIUM 50 MG AND SENNOSIDES 8.6 MG 1 TABLET: 8.6; 5 TABLET, FILM COATED ORAL at 08:56

## 2024-02-27 RX ADMIN — ENOXAPARIN SODIUM 60 MG: 60 INJECTION SUBCUTANEOUS at 05:34

## 2024-02-27 RX ADMIN — LEVOFLOXACIN 500 MG: 500 TABLET, FILM COATED ORAL at 08:55

## 2024-02-27 RX ADMIN — FAMOTIDINE 20 MG: 20 TABLET ORAL at 08:55

## 2024-02-27 RX ADMIN — MEDROXYPROGESTERONE ACETATE 20 MG: 10 TABLET ORAL at 08:55

## 2024-02-27 RX ADMIN — METRONIDAZOLE 500 MG: 500 TABLET ORAL at 08:55

## 2024-02-27 ASSESSMENT — ACTIVITIES OF DAILY LIVING (ADL)
ADLS_ACUITY_SCORE: 23
ADLS_ACUITY_SCORE: 24
ADLS_ACUITY_SCORE: 23
ADLS_ACUITY_SCORE: 24
ADLS_ACUITY_SCORE: 23
ADLS_ACUITY_SCORE: 24
ADLS_ACUITY_SCORE: 24
ADLS_ACUITY_SCORE: 23
ADLS_ACUITY_SCORE: 24
ADLS_ACUITY_SCORE: 23
ADLS_ACUITY_SCORE: 24
ADLS_ACUITY_SCORE: 24
ADLS_ACUITY_SCORE: 23
ADLS_ACUITY_SCORE: 23

## 2024-02-27 NOTE — PROGRESS NOTES
Gynecology Daily Progress Note    Patient name: Danny Villareal  : 1995  Admit date: 2024  MRN: [id number]  Acct: [id number]      Assessment/Plan:  Danny Villareal is a 28y female who admitted for pelvic pain, pelvic infection PID vs TOA, AUB. Today is HD#6.    PID vs TOA (vs GI etiology):  - Received IV Zosyn in ED initially for broad spectrum coverage. Tailored at admission to Mefoxin 2g IV q6hr and Doxy 100mg IV q12hr as that is a recommended first line regimen for PID and for TOA (Mefoxin is q6h for PID and q12hr for TOA so elected to use q6hr freq). WBC noted to have increased with left-shift so Flagyl 500mg q12hr added as well, and has subsequently normalized.   - s/p IR consultation for possible drainage of fluid collection in posterior cul de sac but they were unable to access the area due to surrounding bowel.  - Transition to oral antibiotics on 24 - levofloxacin 500 mg daily, and metronidazole 500 mg BID for 14 days.   - Pt strongly desires to avoid surgery (see prior notes and details regarding Dr. Forbes's FaceTime with pt's family in [facility]). D/w pt if continued lack of improvement, would need to perform diagnostic and therapeutic laparoscopic pelvic washout and would likely try to coordinate this with Gen Surg or Colorectal Surg assistance available in even of GI etiology given pt's reported prior hx.  - Pain: ibuprofen PO, Tylenol PO, Oxycodone PO, and Dilaudid IV for breakthrough. Pt over the last 36hr now down to just Tylenol for pain control, finally not having severe pain episodes  - Discussed option for repeat imaging - at this point given clinical improvement, and plan to avoid surgery, repeat imaging would not add to our clinical decision making. Will hold off for now. Consider US in clinic at follow up or if clinical scenario worsens.     DVT, right upper extremity, provoked  - on Apixaban per Hospitalist, ordered at discharge.  - pt to f/u with PCP for outpatient mgmt  -  per Hospitalist, plan for three months of therapy    - Access: after 4th PIV and pain, pt received PICC yesterday for better access. Cont to use, working well  - Diet: regular  - Activity: up at [facility]  - VTE ppx: SCDs when not ambulating  - Vitals: per routine  - Full code    AUB, subsequent anemia:  - Has been an ongoing issue post-op for ~1mo now  - Provera increased back to 20mg PO BID for bleeding control, likely is related to the infection causing issues. Hopeful this will improve/resolve once the infection is finally managed  - plan to transition to [facility] as outpatient.   - s/p IV venofer inpatient  - Can take PO iron after discharge as well    Follow up in clinic this week for repeat US.     Dispo: stable for discharge to home. Pt scheduled for follow up at [facility] on Thursday. Pt to f/u with Primary Care Provider for mgmt of anticoagulation.      Subjective:  Danny continues to have resolution of abdominal pain. Will feel some rectal urgency after eating but no BM. Having normal BM 1-2 times per day. Vaginal bleeding is currently minimal. Tolerating PO diet w/o N/V. Voiding regularly. Right arm is sore but manageable with tylenol and heat pads.     ROS:  Constitutional: denies fevers  Cardiovascular: denies chest pain  Respiratory: denies dyspnea  Gastrointestinal: negative for abdominal pain today  Neuro: denies headaches, vision changes  Gyn: see HPI  MSK: denies calf pain      Objective:  Vitals:  Temp:  [97.9  F (36.6  C)-99.2  F (37.3  C)] 98.4  F (36.9  C)  Pulse:  [] 87  Resp:  [16-19] 18  BP: (101-120)/(69-76) 108/76  SpO2:  [99 %-100 %] 100 %    Exam:  General: no acute distress  Cardiovascular: pulses intact, no peripheral edema  Pulmonary: no respiratory difficulty, normal non-labored breathing  Abdomen: soft, non-tender to palpation, no rebound or guarding  Psych: mood appropriate      Labs/Imaging:  Results for orders placed or performed during the hospital encounter of  02/22/24 (from the past 24 hour(s))   CBC with platelets differential    Narrative    The following orders were created for panel order CBC with platelets differential.  Procedure                               Abnormality         Status                     ---------                               -----------         ------                     CBC with platelets and d...[[id number]]  Abnormal            Final result                 Please view results for these tests on the individual orders.   Ferritin   Result Value Ref Range    Ferritin 51 6 - 175 ng/mL   CBC with platelets and differential   Result Value Ref Range    WBC Count 9.7 4.0 - 11.0 10e3/uL    RBC Count 3.75 (L) 3.80 - 5.20 10e6/uL    Hemoglobin 7.5 (L) 11.7 - 15.7 g/dL    Hematocrit 25.3 (L) 35.0 - 47.0 %    MCV 68 (L) 78 - 100 fL    MCH 20.0 (L) 26.5 - 33.0 pg    MCHC 29.6 (L) 31.5 - 36.5 g/dL    RDW 20.2 (H) 10.0 - 15.0 %    Platelet Count 623 (H) 150 - 450 10e3/uL    % Neutrophils 64 %    % Lymphocytes 26 %    % Monocytes 8 %    % Eosinophils 1 %    % Basophils 0 %    % Immature Granulocytes 1 %    NRBCs per 100 WBC 0 <1 /100    Absolute Neutrophils 6.2 1.6 - 8.3 10e3/uL    Absolute Lymphocytes 2.5 0.8 - 5.3 10e3/uL    Absolute Monocytes 0.8 0.0 - 1.3 10e3/uL    Absolute Eosinophils 0.1 0.0 - 0.7 10e3/uL    Absolute Basophils 0.0 0.0 - 0.2 10e3/uL    Absolute Immature Granulocytes 0.1 <=0.4 10e3/uL    Absolute NRBCs 0.0 10e3/uL   US Upper Extremity Venous Duplex Right   Result Value Ref Range    Radiologist flags DVT in the right upper extremity (AA)     Narrative    US UPPER EXTREMITY VENOUS DUPLEX RIGHT  2/26/2024 4:09 PM     HISTORY: RUE swelling and pain with midline. r/o dvt    COMPARISON: None.    TECHNIQUE: Color Doppler and spectral waveform analysis performed  throughout the right upper extremity.    FINDINGS: The right internal jugular vein and subclavian vein segments  are patent. Thrombus noted in the brachial veins, nonocclusive in  the  more proximal segment, though more occlusive in the brachial vein in  the more distal segment of the upper arm. Occlusive thrombus also  noted in the axillary vein in the axilla. Occlusive thrombus noted in  the cephalic vein in the distal humerus to the wrist. Nonocclusive  thrombus in the basilic vein of the upper arm. Contralateral left  internal jugular and subclavian veins are patent.      Impression    IMPRESSION:  1. Positive for DVT in the right upper extremity in the segments of  both brachial veins as well as occlusive segment of the axillary vein.  2. Superficial thrombophlebitis in the basilic vein of the upper arm  and cephalic vein in the distal humerus to the wrist.    [Critical Result: DVT in the right upper extremity]    Finding was identified on 2/26/2024 4:21 PM.     Sarah was contacted by me on 2/26/2024 4:34 PM and verbalized  understanding of the critical result.     GREG PAEZ MD         SYSTEM ID:  [id number]   Glucose by meter   Result Value Ref Range    GLUCOSE BY METER POCT 115 (H) 70 - 99 mg/dL   CBC with platelets differential    Narrative    The following orders were created for panel order CBC with platelets differential.  Procedure                               Abnormality         Status                     ---------                               -----------         ------                     CBC with platelets and d...[[id number]]                                                   Please view results for these tests on the individual orders.       Meds:    Current Facility-Administered Medications:      acetaminophen (TYLENOL) tablet 650 mg, 650 mg, Oral, Q4H PRN, Adán Restrepo MD, 650 mg at 02/26/24 2027     bisacodyl (DULCOLAX) suppository 10 mg, 10 mg, Rectal, Daily PRN, Adán Restrepo MD     calcium carbonate (TUMS) chewable tablet 1,000 mg, 1,000 mg, Oral, 4x Daily PRN, Adán Restrepo MD, 1,000 mg at 02/25/24 0510     diphenhydrAMINE (BENADRYL) injection 50 mg, 50 mg,  Intravenous, Once PRN, Mona Crawford MD     enoxaparin ANTICOAGULANT (LOVENOX) injection 60 mg, 1 mg/kg, Subcutaneous, Q12H, Ana Cruz PA-C, 60 mg at 02/27/24 0534     EPINEPHrine (ADRENALIN) kit 0.3 mg, 0.3 mg, Intramuscular, Q3 Min PRN, Mona Crawford MD     famotidine (PEPCID) injection 20 mg, 20 mg, Intravenous, Once PRN, Mona Crawford MD     famotidine (PEPCID) tablet 20 mg, 20 mg, Oral, BID, 20 mg at 02/26/24 2025 **OR** famotidine (PEPCID) injection 20 mg, 20 mg, Intravenous, BID, Adán Restrepo MD, 20 mg at 02/23/24 0749     HYDROmorphone (DILAUDID) injection 0.2 mg, 0.2 mg, Intravenous, Q2H PRN **OR** HYDROmorphone (DILAUDID) injection 0.4 mg, 0.4 mg, Intravenous, Q2H PRN, dAán Restrepo MD, 0.4 mg at 02/24/24 0609     hydrOXYzine HCl (ATARAX) tablet 25 mg, 25 mg, Oral, Q6H PRN, Adán Restrepo MD, 25 mg at 02/22/24 2239     ibuprofen (ADVIL/MOTRIN) tablet 600 mg, 600 mg, Oral, Q6H PRN, Adán Restrepo MD     iron sucrose (VENOFER) 300 mg in sodium chloride 0.9 % 290 mL intermittent infusion, 300 mg, Intravenous, Q72H, Mona Crawford MD, Last Rate: 193.3 mL/hr at 02/26/24 1351, 300 mg at 02/26/24 1351     levofloxacin (LEVAQUIN) tablet 500 mg, 500 mg, Oral, Daily, Mona Crawford MD, 500 mg at 02/26/24 1314     lidocaine (LMX4) cream, , Topical, Q1H PRN, Flora Forbes MD     lidocaine (LMX4) cream, , Topical, Q1H PRN, Adán Restrepo MD     lidocaine 1 % 0.1-1 mL, 0.1-1 mL, Other, Q1H PRN, Flora Forbes MD, 1 mL at 02/24/24 1344     lidocaine 1 % 0.1-1 mL, 0.1-1 mL, Other, Q1H PRN, Adán Restrepo MD     magnesium hydroxide (MILK OF MAGNESIA) suspension 30 mL, 30 mL, Oral, Daily PRN, Adán Restrepo MD     medroxyPROGESTERone (PROVERA) tablet 20 mg, 20 mg, Oral, BID, Flora Forbes MD, 20 mg at 02/26/24 2025     methylPREDNISolone sodium succinate (solu-MEDROL) injection 125 mg, 125 mg, Intravenous, Once PRN, Mona Crawford MD     metroNIDAZOLE (FLAGYL) tablet  500 mg, 500 mg, Oral, BID, Mona Crawford MD, 500 mg at 02/26/24 2025     naloxone (NARCAN) injection 0.2 mg, 0.2 mg, Intravenous, Q2 Min PRN **OR** naloxone (NARCAN) injection 0.4 mg, 0.4 mg, Intravenous, Q2 Min PRN **OR** naloxone (NARCAN) injection 0.2 mg, 0.2 mg, Intramuscular, Q2 Min PRN **OR** naloxone (NARCAN) injection 0.4 mg, 0.4 mg, Intramuscular, Q2 Min PRN, Adán Restrepo MD     ondansetron (ZOFRAN ODT) ODT tab 4 mg, 4 mg, Oral, Q6H PRN **OR** ondansetron (ZOFRAN) injection 4 mg, 4 mg, Intravenous, Q6H PRN, Adán Restrepo MD, 4 mg at 02/25/24 2116     oxyCODONE (ROXICODONE) tablet 5 mg, 5 mg, Oral, Q4H PRN **OR** oxyCODONE (ROXICODONE) tablet 10 mg, 10 mg, Oral, Q4H PRN, Adán Restrepo MD     polyethylene glycol (MIRALAX) Packet 17 g, 17 g, Oral, Daily, Adán Restrepo MD, 17 g at 02/24/24 0818     prochlorperazine (COMPAZINE) injection 10 mg, 10 mg, Intravenous, Q6H PRN, 10 mg at 02/23/24 2046 **OR** prochlorperazine (COMPAZINE) tablet 10 mg, 10 mg, Oral, Q6H PRN, Adán Restrepo MD     senna-docusate (SENOKOT-S/PERICOLACE) 8.6-50 MG per tablet 1 tablet, 1 tablet, Oral, BID, Adán Restrepo MD, 1 tablet at 02/26/24 2025     senna-docusate (SENOKOT-S/PERICOLACE) 8.6-50 MG per tablet 1 tablet, 1 tablet, Oral, BID PRN **OR** senna-docusate (SENOKOT-S/PERICOLACE) 8.6-50 MG per tablet 2 tablet, 2 tablet, Oral, BID PRN, Adán Restrepo MD     sodium chloride (PF) 0.9% PF flush 10-20 mL, 10-20 mL, Intracatheter, q1 min prn, Flora Forbes MD, 20 mL at 02/24/24 1344     sodium chloride (PF) 0.9% PF flush 3 mL, 3 mL, Intracatheter, Q8H, Adán Restrepo MD, 3 mL at 02/27/24 0534     sodium chloride (PF) 0.9% PF flush 3 mL, 3 mL, Intracatheter, q1 min prn, Adán Restrepo MD, 3 mL at 02/23/24 1812      Flora Forbes MD  [facility] [place]  02/27/24 1:21 PM

## 2024-02-27 NOTE — PLAN OF CARE
Goal Outcome Evaluation:    Orientation: a&ox4, tearful and overwhelmed. Pt from [place], only roommates and friends local.   Activity: indep in room, SBA in halls. No activity restriction on RUE, self-limiting d/t pain.   Diet/BS Checks: reg  IV Access/Drains: midline R arm removed due to RUE/Axillae swelling and pain, US done + DVT 2/26.  New R PIV SL. Very painful & anxious with NS flush, go SLOW.  Pain Management: PRN tylenol not needed as abdominal pain much improved. May use heat or ice to RUE PRN pt preference.   Abnormal VS/Results: tachy, worse when anxious. WBC 9.7. Hgb 7.5 (IV iron given 2/26)  Temp 99.2 2/26 during iron, but improved by itself.    Bowel/Bladder: cont B/B. Loose stools yesterday, more form today, MD wants pt to not be constipated, so cont to use stool softeners PRN  Skin/Wounds: RUE noted to have faint red line from old iv site. Scant vag drainage noted, watch for increase as starting lovenox 2/26.   Consults: OB/GYN, hospitalist to manage DVT  D/C Disposition: possible discharge 2/27 after transition to oral antibiotics and oral anticoagulants, would do CT tmr if symptoms worsen.   Other Info:

## 2024-02-27 NOTE — CONSULTS
Patient has [employer] through a student plan.    Xarelto:  $279/mo. Upon receipt of RX [facility] can provide copay savings card to reduce this to $10 per fill (up to a 90 day supply) for the first 90 days, and then $79/mo thereafter.      Eliquis:  $292/mo. Upon receipt of RX [facility]charge [facility] can provide copay savings card to reduce this to $10 per month ongoing       Enoxaparin 60mg x 10 syringes: $31.     Jantoven (warfarin): $2/mo.     Nury Donald  Pharmacy Technician/Liaison, Discharge Pharmacy   [phone] (voice or text)  [email or web address]

## 2024-02-27 NOTE — PROGRESS NOTES
[facility]    Hospitalist Progress Note    Interval History   Pt awake and alert with no acute events overnight . Moderate pain with right upper arm     -Data reviewed today: I reviewed all new labs and imaging results over the last 24 hours. I personally reviewed the dvt us  image(s) showing dvt in the right brachial and axillary veins  .    Physical Exam   Temp: 98.4  F (36.9  C) Temp src: Oral BP: 108/76 Pulse: 87   Resp: 18 SpO2: 100 % O2 Device: None (Room air)    Vitals:    02/22/24 1244   Weight: 63 kg (139 lb)     Vital Signs with Ranges  Temp:  [97.9  F (36.6  C)-99.2  F (37.3  C)] 98.4  F (36.9  C)  Pulse:  [] 87  Resp:  [16-19] 18  BP: (104-120)/(69-76) 108/76  SpO2:  [99 %-100 %] 100 %  No intake/output data recorded.    Physical Exam  Constitutional:       Appearance: Normal appearance.   Cardiovascular:      Rate and Rhythm: Normal rate and regular rhythm.      Pulses: Normal pulses.      Heart sounds: Normal heart sounds.   Pulmonary:      Effort: Pulmonary effort is normal. No respiratory distress.      Breath sounds: Normal breath sounds.   Abdominal:      General: Abdomen is flat. Bowel sounds are normal. There is no distension.      Tenderness: There is no abdominal tenderness. There is no guarding.   Skin:     General: Skin is warm and dry.      Comments: Right arm thrombophlebitis with erythema    Neurological:      General: No focal deficit present.           Medications      apixaban ANTICOAGULANT  10 mg Oral BID    Followed by    [START ON 3/5/2024] apixaban ANTICOAGULANT  5 mg Oral BID    famotidine  20 mg Oral BID    Or    famotidine  20 mg Intravenous BID    iron sucrose  300 mg Intravenous Q72H    levofloxacin  500 mg Oral Daily    medroxyPROGESTERone  20 mg Oral BID    metroNIDAZOLE  500 mg Oral BID    polyethylene glycol  17 g Oral Daily    senna-docusate  1 tablet Oral BID    sodium chloride (PF)  3 mL Intracatheter Q8H       Data   Recent Labs   Lab  02/27/24  0812 02/27/24  0615 02/26/24  0931 02/25/24  0718 02/24/24  0911 02/23/24  0926 02/23/24  0652 02/22/24  1307   WBC 7.2  --  9.7 8.5   < >  --    < > 11.1*   HGB 7.8*  --  7.5* 7.3*   < >  --    < > 9.1*   MCV 68*  --  68* 67*   < >  --    < > 69*   *  --  623* 538*   < >  --    < > 654*   INR  --   --   --   --   --  1.19*  --   --      --   --   --   --   --   --  140   POTASSIUM 3.7  --   --   --   --   --   --  4.0   CHLORIDE 104  --   --   --   --   --   --  105   CO2 24  --   --   --   --   --   --  21*   BUN 3.0*  --   --   --   --   --   --  6.2   CR 0.50*  --   --   --   --   --   --  0.63   ANIONGAP 11  --   --   --   --   --   --  14   VIGNESH 8.9  --   --   --   --   --   --  9.3   * 115*  --   --   --   --   --  96   ALBUMIN  --   --   --   --   --   --   --  4.3   PROTTOTAL  --   --   --   --   --   --   --  7.9   BILITOTAL  --   --   --   --   --   --   --  0.2   ALKPHOS  --   --   --   --   --   --   --  54   ALT  --   --   --   --   --   --   --  14   AST  --   --   --   --   --   --   --  14   LIPASE  --   --   --   --   --   --   --  30    < > = values in this interval not displayed.       Recent Results (from the past 24 hour(s))   US Upper Extremity Venous Duplex Right   Result Value    Radiologist flags DVT in the right upper extremity (AA)    Narrative    US UPPER EXTREMITY VENOUS DUPLEX RIGHT  2/26/2024 4:09 PM     HISTORY: RUE swelling and pain with midline. r/o dvt    COMPARISON: None.    TECHNIQUE: Color Doppler and spectral waveform analysis performed  throughout the right upper extremity.    FINDINGS: The right internal jugular vein and subclavian vein segments  are patent. Thrombus noted in the brachial veins, nonocclusive in the  more proximal segment, though more occlusive in the brachial vein in  the more distal segment of the upper arm. Occlusive thrombus also  noted in the axillary vein in the axilla. Occlusive thrombus noted in  the cephalic vein in the  distal humerus to the wrist. Nonocclusive  thrombus in the basilic vein of the upper arm. Contralateral left  internal jugular and subclavian veins are patent.      Impression    IMPRESSION:  1. Positive for DVT in the right upper extremity in the segments of  both brachial veins as well as occlusive segment of the axillary vein.  2. Superficial thrombophlebitis in the basilic vein of the upper arm  and cephalic vein in the distal humerus to the wrist.    [Critical Result: DVT in the right upper extremity]    Finding was identified on 2/26/2024 4:21 PM.     Sarah was contacted by me on 2/26/2024 4:34 PM and verbalized  understanding of the critical result.     GREG PAEZ MD         SYSTEM ID:  [id number]         Assessment & Plan      Danny Villareal is a 28 year old female with PMH of anemia, endometriosis, fibroids who presented to the ED on 2/22/24 for evaluation of pelvic pain.      She was evaluated in January for heavy VB and found to have endometrial polyps and underwent hysteroscopic removal of endometrial polyps without complication. Afterwards, had irregular and abnormal uterine bleeding managed with progesterone and OCP without resolution. She then developed heavy VB, N/V, severe abdominal pain on 2/10 resulting in admission 2/13 for endometritis and treated with antibiotics and started on Provera taper for bleeding control, discharged home with outpatient management 2/14. She had colonoscopy by [facility] on 2/21 with recurrence of abdominal pain, nausea, abnormal vaginal bleeding.      Presented to ED 2/22 where she had imaging showing rim-enhancing fluid collections adjacent to the uterus (favoring abscess formation) as well as a discrete one measuring 5.0 x 3.3 x 2.6 cm. She was admitted to OB/GYN service 2/22/24 and started on Mefoxin and Doxycycline for PID and TOA. IR was consulted 2/23 for possible drainage of fluid collection in posterior cul de sac, unable to access area due to surrounding bowel.  OB/GYN plan to continue IV abx with goal to avoid surgery if possible. Patient had RUE midline placed on 2/24. Patient is clinically improving from infection standpoint and was transitioned to oral antibiotics today. She was noted to have RUE swelling and pain which prompted venous US showing an acute DVT. Hospitalist service was consulted for management of acute DVT.     Acute provoked RUE DVT  *Patient has had multiple IV lines in the RUE as well as a midline placed 2/24  *Venous US RUE 2/26 shows DVT in the RUE in the segments of both brachial veins as well as occlusive segment of the axillary vein. Superficial thrombophlebitis int he basilic vein of the upper arm and cephalic vein in the distal humerus to the wrist.  *No plans for surgical intervention per OB/GYN  *She has no complaints of chest pain or dyspnea, no LE swelling or calf pain  -Started on lovenox yesterday. Transitioned to apixaban today for dvt dosing. Complete 3 month course for provoked dvt. Follow up with pcp regarding need for further anticoagulation .   -Pharmacy consult for DOAC cost  -Likely plan for 3 month course of anticoagulation, will need referral to PCP     Pelvic infection PID vs TOA  *IR consulted 2/23 but unable to access fluid collection in posterior cul de sac due to surrounding bowel  *Goal of avoiding surgery by treating with abx but if increase in WBC and worsening imaging may need to consider diagnostic laparoscopy. Per OB/GYN 2/26 patient is clinically improving, possibly medically stable for discharge 2/27  *She was placed on IV Mefoxin and Doxycycline on admission, and switched to PO Levofloxacin and Flagyl today with plan for 14 day course by OB/GYN  -Continue abx per primary service  -Pain control per primary service     Abnormal vaginal bleeding  *Currently being managed by primary team, OB/GYN  *Patient reports only small amount of vaginal bleeding at this time  -Continue Provera 20 mg BID     Anemia  *Acute on  chronic blood loss anemia due to vaginal bleeding  *Hgb has been stable around 7.5  *S/p IV Venofer 2/26  -Follow H&H       DVT Prophylaxis: DOAC  Code Status: Full Code  Disposition: Expected discharge today    >35 min spent completing chart review, documentation and discussing care with pt and nurse     Xiomara Wilks MD, MD  [phone](p)

## 2024-02-27 NOTE — PLAN OF CARE
Orientation: a&ox4, anxious  Activity: ind in room  Diet/BS Checks: reg  IV Access/Drains:   R PIV SL. Very painful & anxious with flush, pt requests to go very slow  Pain Management: RUE pain, pt uses ice/heat for relief  Abnormal VS/Results: VSS on RA ex tachy  Bowel/Bladder: cont B/B. 1 bm this am.  MD wants pt to not be constipated, so cont to use stool softeners PRN  Skin/Wounds: RUE noted to have faint red line from old iv site. Small amt of vaginal drainage but watch for increase as pt is on lovenox as of  2/26.   Consults: OB/GYN, hospitalist   D/C Disposition:pending   Other Info: pt had BM this am and complained of being dizzy once back in bed, vitals stable and improved after drinking some apple juice.

## 2024-02-27 NOTE — PLAN OF CARE
Goal Outcome Evaluation:       Discharge Note    Patient discharged to home via private vehicle  accompanied by friend.  IV: Discontinued  Prescriptions filled and given to patient/family.   Belongings reviewed and sent with patient.   Home medications returned to patient: NA  Equipment sent with: N/A.   patient verbalizes understanding of discharge instructions. AVS given to patient.

## 2024-02-28 ENCOUNTER — PATIENT OUTREACH (OUTPATIENT)
Dept: CARE COORDINATION | Facility: CLINIC | Age: 29
End: 2024-02-28
Payer: COMMERCIAL

## 2024-02-28 NOTE — PROGRESS NOTES
"Clinic Care Coordination Contact  [facility]: Post-Discharge Note  SITUATION                                                      Admission:    Admission Date: 02/22/24   Reason for Admission: PID (acute pelvic inflammatory disease)  Discharge:   Discharge Date: 02/27/24  Discharge Diagnosis: PID (acute pelvic inflammatory disease)    BACKGROUND                                                      Per hospital discharge summary and inpatient provider notes:  Chief Complaint/Reason for Visit: pelvic pain, pelvic infection     History of Present Illness: Danny Villareal is a 28 year old female here for evaluation of pelvic pain, pelvic infection. She was seen in January with heavy VB and found to have endometrial polyps. Hysteroscopic removal of endometrial polyps was performed on 1/15 without issue. Thereafter, she struggled with irregular/abnormal uterine bleeding. She was managed with progesterone and OCP without resolution but didn't have pain or other issues until developing heavy VB, N/V, and severe abd pain on 2/10. Evaluation in the ED on 2/12 revealed a possibly enlarging posterior uterine fibroid vs adenomyosis but her pain was able to be managed and bleeding improved so she was discharged home. She later developed abdominal bloating and acute re-worsening of pain and re-presented and was admitted on 2/13 for management. She was started on endometritis antibiotics with Rocephin x1 and Doxy/Flagyl and also started on Provera taper for bleeding control and was able to be discharged home for outpatient management on 2/14.    ASSESSMENT           Discharge Assessment  How are you doing now that you are home?: \" I'm doing okay \"  How are your symptoms? (Red Flag symptoms escalate to triage hotline per guidelines): Improved  Do you feel your condition is stable enough to be safe at home until your provider visit?: Yes  Does the patient have their discharge instructions? : Yes  Does the patient have " questions regarding their discharge instructions? : No  Were you started on any new medications or were there changes to any of your previous medications? : Yes  Does the patient have all of their medications?: Yes  Do you have questions regarding any of your medications? : No  Do you have all of your needed medical supplies or equipment (DME)?  (i.e. oxygen tank, CPAP, cane, etc.): Yes  Discharge follow-up appointment scheduled within 14 calendar days? : No  Is patient agreeable to assistance with scheduling? : No    Post-op ([facility] Only)  If the patient had a surgery or procedure, do they have any questions for a nurse?: No             PLAN                                                      Outpatient Plan:  Your activity upon discharge: activity as tolerated  Follow this diet upon discharge: Regular  Follow-up and recommended labs and tests  Follow up with primary care provider, within 4 weeks, to evaluate  management of DVT of right arm.  Follow up [facility] on Thursday for repeat pelvic  ultrasound and reevaluation of clinical status.  No future appointments.      For any urgent concerns, please contact our 24 hour nurse triage line: [phone] ([phone])         Chelsey Blanchard MA

## 2024-03-10 ENCOUNTER — HEALTH MAINTENANCE LETTER (OUTPATIENT)
Age: 29
End: 2024-03-10

## 2024-03-18 NOTE — DISCHARGE SUMMARY
[facility] Discharge Summary    Danny Villareal MRN# [id number]   Age: 28 year old YOB: 1995     Date of Admission:  2/22/2024  Date of Discharge::  2/27/2024  4:50 PM   Admitting Physician:  Adán Restrepo MD  Discharge Physician:  Flora Forbes MD     [facility] clinic[facility]          Admission Diagnoses:   Pelvic Pain - suspected PID          Discharge Diagnosis:   PID vs endometriosis  Right upper extremity DVT secondary to midline placement          Procedures:   Procedure(s): Midline placement by consultation team       -           Medications Prior to Admission:     No medications prior to admission.             Discharge Medications:     Discharge Medication List as of 2/27/2024  2:31 PM      START taking these medications    Details   apixaban ANTICOAGULANT (ELIQUIS) 5 MG tablet Take 2 tablets (10 mg) by mouth 2 times daily for 7 days, THEN 1 tablet (5 mg) 2 times daily for 90 days., Disp-97 tablet, R-1, E-Prescribe      levofloxacin (LEVAQUIN) 500 MG tablet Take 1 tablet (500 mg) by mouth daily for 9 days, Disp-9 tablet, R-0, E-Prescribe         CONTINUE these medications which have CHANGED    Details   medroxyPROGESTERone (PROVERA) 10 MG tablet Take 2 tablets (20 mg) by mouth 2 times daily for 60 days, Disp-120 tablet, R-1, E-Prescribe      metroNIDAZOLE (FLAGYL) 500 MG tablet Take 1 tablet (500 mg) by mouth 2 times daily for 9 days, Disp-18 tablet, R-0, E-Prescribe         CONTINUE these medications which have NOT CHANGED    Details   ondansetron (ZOFRAN ODT) 4 MG ODT tab Take 1 tablet (4 mg) by mouth every 8 hours as needed for nausea, Disp-30 tablet, R-0, E-Prescribe      simethicone (MYLICON) 125 MG chewable tablet Take 1 tablet (125 mg) by mouth 2 times daily, Disp-20 tablet, R-0, E-Prescribe         STOP taking these medications       doxycycline hyclate (VIBRAMYCIN) 100 MG capsule Comments:   Reason for Stopping:         ibuprofen (ADVIL/MOTRIN) 200 MG tablet  Comments:   Reason for Stopping:                     Consultations:   Consultation during this admission received from internal medicine and interventional radiology            Hospital Course:   Danny Villareal presented to the ED with worsened pelvic pain following a colonoscopy. She had previously been admitted for suspected pelvic infection, favoring PID vs GI etiology. Outpatient colonoscopy was normal, pt admitted for pain management and IV antibiotics. A posterior cul de sac fluid collection measuring 5cm was noted, interventional radiology consulted for drainage however unable to access fluid collection due to anatomic location and overlying bowel. Pt continued to receive Mefoxin and doxycycline IV, she began to have clinical improvement however throughout admission, difficulty maintaining IV access, pt had four different peripheral IVs, anticipated multiple additional days of IV abx so midline placed in right upper extremity. Pt began to experience severe pain of this site and was diagnosed with a DVT of the right upper extremity. At this time internal medicine consulted for initiation and management of anticoagulation. Pt's clinical picture continued to improve. She was transitioned to oral antibiotics of levofloxacin and metronidazole. She continued to have improvement. Vaginal bleeding minimal on provera 20mg PO BID. Pt discharged to home with plan for total 14 days antibiotics and continued provera for amenorrhea while pt is anemic. Plan for follow up with PCP for mgmt of DVT, pt discharged with apixaban for mgmt.    Post-partum hemoglobin:   Hemoglobin   Date Value Ref Range Status   02/27/2024 7.8 (L) 11.7 - 15.7 g/dL Final             Discharge Instructions and Follow-Up:   Discharge diet: Regular   Discharge activity: Activity as tolerated   Discharge follow-up: Follow up with [facility] in 1 weeks and PCP within one week   Wound care: N/a           Discharge Disposition:   Discharged to home       Attestation:  I have reviewed today's vital signs, notes, medications, labs and imaging.  Amount of time performed on this discharge summary: 8 minutes.    Flora Forbes MD

## 2024-03-19 ENCOUNTER — OFFICE VISIT (OUTPATIENT)
Dept: FAMILY MEDICINE | Facility: CLINIC | Age: 29
End: 2024-03-19
Payer: COMMERCIAL

## 2024-03-19 VITALS
DIASTOLIC BLOOD PRESSURE: 80 MMHG | BODY MASS INDEX: 21.79 KG/M2 | WEIGHT: 135 LBS | RESPIRATION RATE: 17 BRPM | TEMPERATURE: 97.8 F | OXYGEN SATURATION: 100 % | HEART RATE: 112 BPM | SYSTOLIC BLOOD PRESSURE: 108 MMHG

## 2024-03-19 DIAGNOSIS — M54.50 ACUTE MIDLINE LOW BACK PAIN WITHOUT SCIATICA: ICD-10-CM

## 2024-03-19 DIAGNOSIS — N73.0 PID (ACUTE PELVIC INFLAMMATORY DISEASE): ICD-10-CM

## 2024-03-19 DIAGNOSIS — Z11.4 SCREENING FOR HIV (HUMAN IMMUNODEFICIENCY VIRUS): ICD-10-CM

## 2024-03-19 DIAGNOSIS — N71.0 ACUTE ENDOMETRITIS: ICD-10-CM

## 2024-03-19 DIAGNOSIS — D50.9 IRON DEFICIENCY ANEMIA, UNSPECIFIED IRON DEFICIENCY ANEMIA TYPE: ICD-10-CM

## 2024-03-19 DIAGNOSIS — I82.621 ACUTE DEEP VEIN THROMBOSIS (DVT) OF BRACHIAL VEIN OF RIGHT UPPER EXTREMITY (H): Primary | ICD-10-CM

## 2024-03-19 DIAGNOSIS — E53.8 VITAMIN B12 DEFICIENCY: ICD-10-CM

## 2024-03-19 DIAGNOSIS — Z11.59 NEED FOR HEPATITIS C SCREENING TEST: ICD-10-CM

## 2024-03-19 DIAGNOSIS — D64.9 ANEMIA, UNSPECIFIED TYPE: ICD-10-CM

## 2024-03-19 LAB
BASOPHILS # BLD AUTO: ABNORMAL 10*3/UL
BASOPHILS # BLD MANUAL: 0.1 10E3/UL (ref 0–0.2)
BASOPHILS NFR BLD AUTO: ABNORMAL %
BASOPHILS NFR BLD MANUAL: 1 %
DACRYOCYTES BLD QL SMEAR: SLIGHT
EOSINOPHIL # BLD AUTO: ABNORMAL 10*3/UL
EOSINOPHIL # BLD MANUAL: 0.4 10E3/UL (ref 0–0.7)
EOSINOPHIL NFR BLD AUTO: ABNORMAL %
EOSINOPHIL NFR BLD MANUAL: 4 %
ERYTHROCYTE [DISTWIDTH] IN BLOOD BY AUTOMATED COUNT: 24 % (ref 10–15)
FRAGMENTS BLD QL SMEAR: SLIGHT
HCT VFR BLD AUTO: 35.8 % (ref 35–47)
HGB BLD-MCNC: 10.4 G/DL (ref 11.7–15.7)
IMM GRANULOCYTES # BLD: ABNORMAL 10*3/UL
IMM GRANULOCYTES NFR BLD: ABNORMAL %
LYMPHOCYTES # BLD AUTO: ABNORMAL 10*3/UL
LYMPHOCYTES # BLD MANUAL: 4.3 10E3/UL (ref 0.8–5.3)
LYMPHOCYTES NFR BLD AUTO: ABNORMAL %
LYMPHOCYTES NFR BLD MANUAL: 44 %
MCH RBC QN AUTO: 20.5 PG (ref 26.5–33)
MCHC RBC AUTO-ENTMCNC: 29.1 G/DL (ref 31.5–36.5)
MCV RBC AUTO: 71 FL (ref 78–100)
MONOCYTES # BLD AUTO: ABNORMAL 10*3/UL
MONOCYTES # BLD MANUAL: 0.6 10E3/UL (ref 0–1.3)
MONOCYTES NFR BLD AUTO: ABNORMAL %
MONOCYTES NFR BLD MANUAL: 6 %
NEUTROPHILS # BLD AUTO: ABNORMAL 10*3/UL
NEUTROPHILS # BLD MANUAL: 4.4 10E3/UL (ref 1.6–8.3)
NEUTROPHILS NFR BLD AUTO: ABNORMAL %
NEUTROPHILS NFR BLD MANUAL: 45 %
NRBC # BLD AUTO: 0 10E3/UL
NRBC BLD AUTO-RTO: 0 /100
PLAT MORPH BLD: ABNORMAL
PLATELET # BLD AUTO: 492 10E3/UL (ref 150–450)
RBC # BLD AUTO: 5.08 10E6/UL (ref 3.8–5.2)
RBC MORPH BLD: ABNORMAL
SMUDGE CELLS BLD QL SMEAR: PRESENT
WBC # BLD AUTO: 9.7 10E3/UL (ref 4–11)

## 2024-03-19 PROCEDURE — 83540 ASSAY OF IRON: CPT | Performed by: INTERNAL MEDICINE

## 2024-03-19 PROCEDURE — 82607 VITAMIN B-12: CPT | Performed by: INTERNAL MEDICINE

## 2024-03-19 PROCEDURE — 80053 COMPREHEN METABOLIC PANEL: CPT | Performed by: INTERNAL MEDICINE

## 2024-03-19 PROCEDURE — 99496 TRANSJ CARE MGMT HIGH F2F 7D: CPT | Performed by: INTERNAL MEDICINE

## 2024-03-19 PROCEDURE — 36415 COLL VENOUS BLD VENIPUNCTURE: CPT | Performed by: INTERNAL MEDICINE

## 2024-03-19 PROCEDURE — 85007 BL SMEAR W/DIFF WBC COUNT: CPT | Performed by: INTERNAL MEDICINE

## 2024-03-19 PROCEDURE — 87389 HIV-1 AG W/HIV-1&-2 AB AG IA: CPT | Performed by: INTERNAL MEDICINE

## 2024-03-19 PROCEDURE — 83550 IRON BINDING TEST: CPT | Performed by: INTERNAL MEDICINE

## 2024-03-19 PROCEDURE — 86803 HEPATITIS C AB TEST: CPT | Performed by: INTERNAL MEDICINE

## 2024-03-19 PROCEDURE — 85027 COMPLETE CBC AUTOMATED: CPT | Performed by: INTERNAL MEDICINE

## 2024-03-19 ASSESSMENT — PAIN SCALES - GENERAL: PAINLEVEL: MODERATE PAIN (5)

## 2024-03-19 NOTE — PATIENT INSTRUCTIONS
As discussed , will do pertinent work up for your Hospital follow up today.     Will consider Hypercoagulation work up in your physical once you are Off Elquis in 6/15/24.     ========================================

## 2024-03-19 NOTE — PROGRESS NOTES
Assessment and Plan  1. Acute deep vein thrombosis (DVT) of brachial vein of right upper extremity (H)  Recurrent ER visits and hospitalizations-last Hospitalization from  2/22/24 - 2/27/24 with PID and RUE DVT . Pt was discharged home with Elquis and Levaquin respectively. Given the irregular bleeding on Provera 20 mg BID at that time.   -Discussed on the guidelines of provoked DVT as in this case due to IV line access issue problems for which they had to reach more medial aspect of the right leading to this DVT which is positive on ultrasound imaging done as confirmed and discussed with the patient by me today.  -Patient was discharged on Eliquis for 1 month supplies, went ahead and given it for 2 more months with the last date as mentioned on the prescription.  Patient understood and agreed with the plan  - apixaban ANTICOAGULANT (ELIQUIS) 5 MG tablet; Take 1 tab twice daily and OK to stop in 5/27/24 with completion of treatment.  Dispense: 60 tablet; Refill: 1  - diclofenac (VOLTAREN) 1 % topical gel; Apply 4 g topically 4 times daily  Dispense: 350 g; Refill: 2    2. Acute endometritis  3. PID (acute pelvic inflammatory disease)  Ongoing problem, patient has been following OBGYN and had recent hysteroscopic polypectomy in 1/2024 followed by recurrent ER visits and Hospitalizations for Endometritis as well as PID. Pt is prescribed Orlissa for endometriosis as managed by OBGYN.  To follow-up with gynecology on further recommendations for managing this.  - Comprehensive metabolic panel (BMP + Alb, Alk Phos, ALT, AST, Total. Bili, TP); Future  - Comprehensive metabolic panel (BMP + Alb, Alk Phos, ALT, AST, Total. Bili, TP)    4. Anemia, unspecified type  Chronic problem, uncontrolled.  Does have history of infusions of blood as patient endorses given the last hemoglobin levels at 7.6.  Will go ahead and recheck the labs as well as do further recommendations.  Patient understood and agreed the plan.  - CBC with  platelets and differential; Future  - Iron and iron binding capacity; Future  - Vitamin B12; Future  - CBC with platelets and differential  - Iron and iron binding capacity  - Vitamin B12    5. Acute midline low back pain without sciatica  New problem with ongoing generalized body pains and fatigue as patient endorses due to most likely iron deficiency anemia as a differential diagnosis.  Denies any trauma, physical exam Positive for tenderness on palpation of the vertebrae L1-L3 and also paraspinal tenderness in the same area.  No restriction of range of movements except subjective pain.  -Differential diagnosis of musculoskeletal reasons versus iron deficiency anemia.  Will give topical diclofenac and okay to take Tylenol as needed.  - diclofenac (VOLTAREN) 1 % topical gel; Apply 4 g topically 4 times daily  Dispense: 350 g; Refill: 2    6. Screening for HIV (human immunodeficiency virus)  - HIV Antigen Antibody Combo; Future  - HIV Antigen Antibody Combo    7. Need for hepatitis C screening test  - Hepatitis C Screen Reflex to HCV RNA Quant and Genotype; Future  - Hepatitis C Screen Reflex to HCV RNA Quant and Genotype    8. Vitamin B12 deficiency  New problem, given the low Vitamin B12 will place B12 injections Q weekly for 4 weeks and Q monthly for 12 months . Orders paced.  - cyanocobalamin injection 1,000 mcg  - cyanocobalamin injection 1,000 mcg    9. Iron deficiency anemia, unspecified iron deficiency anemia type  Ongoing problem, Uncontrolled. Pt has been followed by Oncology with transfusions. Currently sent in oral iron tablets.   - ferrous sulfate (FE TABS) 325 (65 Fe) MG EC tablet; Take 1 tablet (325 mg) by mouth daily  Dispense: 90 tablet; Refill: 1              MED REC REQUIRED  Post Medication Reconciliation Status:  Discharge medications reconciled and changed, see notes/orders      Please note that this note consists of symbols derived from keyboarding, dictation and/or voice recognition software.  As a result, there may be errors in the script that have gone undetected. Please consider this when interpreting information found in this chart.    Patient Instructions   As discussed , will do pertinent work up for your Hospital follow up today.     Will consider Hypercoagulation work up in your physical once you are Off Elquis in 6/15/24.     ========================================            Return in about 3 months (around 6/19/2024), or if symptoms worsen or fail to improve, for Preventative Visit.    Marielena Gonzales MD  [facility] MELODIE Delgado is a 28 year old, presenting for the following health issues:  Hospital F/U        3/19/2024     2:31 PM   Additional Questions   Roomed by Brenda     [facility]       Hospital Follow-up Visit:    Hospital/Nursing Home/IP Rehab Facility: [facility]  Date of Admission: 02/22/24  Date of Discharge: 02/27/24  Reason(s) for Admission: PID (acute pelvic inflammatory disease), DVT of upper extremity     Was your hospitalization related to COVID-19? No   Problems taking medications regularly:  None  Medication changes since discharge: Discussed the duration of Eliquis and refilled accordingly.  Problems adhering to non-medication therapy:  None    Summary of hospitalization:  [facility] discharge summary reviewed  Diagnostic Tests/Treatments reviewed.  Follow up needed: PCP , OBGYN  Other Healthcare Providers Involved in Patient s Care:         Homecare  Update since discharge: improved.       Plan of care communicated with patient      Pt is new to  , Has been following OBGYN and had recent hysteroscopic polypectomy in 1/2024 followed by recurrent ER visits and Hospitalizations for Endometritis as well as PID .                 No Known Allergies     Past Medical History:   Diagnosis Date    Anemia     Endometriosis     Fibroid uterus        Past Surgical History:   Procedure Laterality Date     COLONOSCOPY  02-    OPERATIVE HYSTEROSCOPY WITH MORCELLATOR N/A 01/15/2024    Procedure: hysteroscopy with myosure polypectomy;  Surgeon: Flora Forbes MD;  Location:  OR       History reviewed. No pertinent family history.    Social History     Tobacco Use    Smoking status: Never    Smokeless tobacco: Never   Substance Use Topics    Alcohol use: Not Currently        Current Outpatient Medications   Medication    apixaban ANTICOAGULANT (ELIQUIS) 5 MG tablet    apixaban ANTICOAGULANT (ELIQUIS) 5 MG tablet    diclofenac (VOLTAREN) 1 % topical gel    medroxyPROGESTERone (PROVERA) 10 MG tablet    ondansetron (ZOFRAN ODT) 4 MG ODT tab    simethicone (MYLICON) 125 MG chewable tablet     No current facility-administered medications for this visit.        Review of Systems  Constitutional, HEENT, cardiovascular, pulmonary, GI, , musculoskeletal, neuro, skin, endocrine and psych systems are negative, except as otherwise noted.      Objective    /80   Pulse 112   Temp 97.8  F (36.6  C) (Temporal)   Resp 17   Wt 61.2 kg (135 lb)   LMP 01/08/2024 (Approximate)   SpO2 100%   BMI 21.79 kg/m    Body mass index is 21.79 kg/m .  Physical Exam   GENERAL: alert and no distress  NECK: no adenopathy, no asymmetry, masses, or scars  RESP: lungs clear to auscultation - no rales, rhonchi or wheezes  CV: regular rate and rhythm, normal S1 S2, no S3 or S4, no murmur, click or rub, no peripheral edema  ABDOMEN: soft, nontender, no hepatosplenomegaly, no masses and bowel sounds normal  MS: no gross musculoskeletal defects noted, no edema  BACK EXAM : Positive for tenderness on palpation of the vertebrae L1-L3 and also paraspinal tenderness in the same area.  No restriction of range of movements except subjective pain.    Signed Electronically by: Marielena Gonzales MD

## 2024-03-20 LAB
ALBUMIN SERPL BCG-MCNC: 4.6 G/DL (ref 3.5–5.2)
ALP SERPL-CCNC: 65 U/L (ref 40–150)
ALT SERPL W P-5'-P-CCNC: 17 U/L (ref 0–50)
ANION GAP SERPL CALCULATED.3IONS-SCNC: 13 MMOL/L (ref 7–15)
AST SERPL W P-5'-P-CCNC: 18 U/L (ref 0–45)
BILIRUB SERPL-MCNC: 0.2 MG/DL
BUN SERPL-MCNC: 5.9 MG/DL (ref 6–20)
CALCIUM SERPL-MCNC: 9.2 MG/DL (ref 8.6–10)
CHLORIDE SERPL-SCNC: 107 MMOL/L (ref 98–107)
CREAT SERPL-MCNC: 0.46 MG/DL (ref 0.51–0.95)
DEPRECATED HCO3 PLAS-SCNC: 21 MMOL/L (ref 22–29)
EGFRCR SERPLBLD CKD-EPI 2021: >90 ML/MIN/1.73M2
GLUCOSE SERPL-MCNC: 77 MG/DL (ref 70–99)
HCV AB SERPL QL IA: NONREACTIVE
HIV 1+2 AB+HIV1 P24 AG SERPL QL IA: NONREACTIVE
IRON BINDING CAPACITY (ROCHE): 413 UG/DL (ref 240–430)
IRON SATN MFR SERPL: 6 % (ref 15–46)
IRON SERPL-MCNC: 23 UG/DL (ref 37–145)
POTASSIUM SERPL-SCNC: 4.1 MMOL/L (ref 3.4–5.3)
PROT SERPL-MCNC: 7.6 G/DL (ref 6.4–8.3)
SODIUM SERPL-SCNC: 141 MMOL/L (ref 135–145)
VIT B12 SERPL-MCNC: 220 PG/ML (ref 232–1245)

## 2024-03-21 ENCOUNTER — MYC MEDICAL ADVICE (OUTPATIENT)
Dept: FAMILY MEDICINE | Facility: CLINIC | Age: 29
End: 2024-03-21
Payer: COMMERCIAL

## 2024-03-21 RX ORDER — CYANOCOBALAMIN 1000 UG/ML
1000 INJECTION, SOLUTION INTRAMUSCULAR; SUBCUTANEOUS
Status: ACTIVE | OUTPATIENT
Start: 2024-03-21 | End: 2025-03-16

## 2024-03-21 RX ORDER — FERROUS SULFATE 325(65) MG
325 TABLET, DELAYED RELEASE (ENTERIC COATED) ORAL DAILY
Qty: 90 TABLET | Refills: 1 | Status: SHIPPED | OUTPATIENT
Start: 2024-03-21

## 2024-03-21 NOTE — TELEPHONE ENCOUNTER
Per 3/19 OV note:  8. Vitamin B12 deficiency  New problem, given the low Vitamin B12 will place B12 injections Q weekly for 4 weeks and Q monthly for 12 months . Orders paced.  - cyanocobalamin injection 1,000 mcg  - cyanocobalamin injection 1,000 mcg    Will replay to pt.   Thank you,  Martina Webb RN

## 2024-03-22 ENCOUNTER — ALLIED HEALTH/NURSE VISIT (OUTPATIENT)
Dept: FAMILY MEDICINE | Facility: CLINIC | Age: 29
End: 2024-03-22
Payer: COMMERCIAL

## 2024-03-22 DIAGNOSIS — D64.9 ANEMIA, UNSPECIFIED TYPE: Primary | ICD-10-CM

## 2024-03-22 PROCEDURE — 99207 PR NO CHARGE NURSE ONLY: CPT

## 2024-03-22 PROCEDURE — 96372 THER/PROPH/DIAG INJ SC/IM: CPT | Performed by: INTERNAL MEDICINE

## 2024-03-22 RX ADMIN — CYANOCOBALAMIN 1000 MCG: 1000 INJECTION, SOLUTION INTRAMUSCULAR; SUBCUTANEOUS at 14:40

## 2024-03-29 ENCOUNTER — ALLIED HEALTH/NURSE VISIT (OUTPATIENT)
Dept: FAMILY MEDICINE | Facility: CLINIC | Age: 29
End: 2024-03-29
Payer: COMMERCIAL

## 2024-03-29 DIAGNOSIS — E53.8 VITAMIN B12 DEFICIENCY: Primary | ICD-10-CM

## 2024-03-29 PROCEDURE — 99207 PR NO CHARGE NURSE ONLY: CPT

## 2024-03-29 PROCEDURE — 96372 THER/PROPH/DIAG INJ SC/IM: CPT | Performed by: INTERNAL MEDICINE

## 2024-03-29 RX ADMIN — CYANOCOBALAMIN 1000 MCG: 1000 INJECTION, SOLUTION INTRAMUSCULAR; SUBCUTANEOUS at 14:14

## 2024-03-29 NOTE — PROGRESS NOTES
Clinic Administered Medication Documentation        Patient was given CYANOCOBALAMIN B12 . Prior to medication administration, verified patient's identity using patient s name and date of birth. Please see MAR and medication order for additional information. Patient instructed to remain in clinic for 15 minutes and report any adverse reaction to staff immediately.    Vial/Syringe: Single dose vial. Was entire vial of medication used? Yes    Evelin Tsang MA on 3/29/2024 at 2:19 PM

## 2024-04-03 ENCOUNTER — APPOINTMENT (OUTPATIENT)
Dept: ULTRASOUND IMAGING | Facility: CLINIC | Age: 29
End: 2024-04-03
Attending: EMERGENCY MEDICINE
Payer: COMMERCIAL

## 2024-04-03 ENCOUNTER — HOSPITAL ENCOUNTER (EMERGENCY)
Facility: CLINIC | Age: 29
Discharge: HOME | End: 2024-04-04
Attending: EMERGENCY MEDICINE | Admitting: EMERGENCY MEDICINE
Payer: COMMERCIAL

## 2024-04-03 DIAGNOSIS — R10.2 PELVIC PAIN: ICD-10-CM

## 2024-04-03 LAB
ALBUMIN UR-MCNC: 30 MG/DL
ANION GAP SERPL CALCULATED.3IONS-SCNC: 16 MMOL/L (ref 7–15)
APPEARANCE UR: ABNORMAL
BASOPHILS # BLD AUTO: 0 10E3/UL (ref 0–0.2)
BASOPHILS NFR BLD AUTO: 0 %
BILIRUB UR QL STRIP: NEGATIVE
BUN SERPL-MCNC: 8.1 MG/DL (ref 6–20)
CALCIUM SERPL-MCNC: 9.9 MG/DL (ref 8.6–10)
CHLORIDE SERPL-SCNC: 105 MMOL/L (ref 98–107)
COLOR UR AUTO: YELLOW
CREAT SERPL-MCNC: 0.55 MG/DL (ref 0.51–0.95)
CRP SERPL-MCNC: <3 MG/L
DEPRECATED HCO3 PLAS-SCNC: 20 MMOL/L (ref 22–29)
EGFRCR SERPLBLD CKD-EPI 2021: >90 ML/MIN/1.73M2
EOSINOPHIL # BLD AUTO: 0 10E3/UL (ref 0–0.7)
EOSINOPHIL NFR BLD AUTO: 0 %
ERYTHROCYTE [DISTWIDTH] IN BLOOD BY AUTOMATED COUNT: 24.1 % (ref 10–15)
GLUCOSE SERPL-MCNC: 95 MG/DL (ref 70–99)
GLUCOSE UR STRIP-MCNC: NEGATIVE MG/DL
HCG SERPL QL: NEGATIVE
HCT VFR BLD AUTO: 38.5 % (ref 35–47)
HGB BLD-MCNC: 11.6 G/DL (ref 11.7–15.7)
HGB UR QL STRIP: ABNORMAL
IMM GRANULOCYTES # BLD: 0.1 10E3/UL
IMM GRANULOCYTES NFR BLD: 0 %
KETONES UR STRIP-MCNC: 10 MG/DL
LEUKOCYTE ESTERASE UR QL STRIP: ABNORMAL
LYMPHOCYTES # BLD AUTO: 2 10E3/UL (ref 0.8–5.3)
LYMPHOCYTES NFR BLD AUTO: 13 %
MCH RBC QN AUTO: 21.2 PG (ref 26.5–33)
MCHC RBC AUTO-ENTMCNC: 30.1 G/DL (ref 31.5–36.5)
MCV RBC AUTO: 70 FL (ref 78–100)
MONOCYTES # BLD AUTO: 0.7 10E3/UL (ref 0–1.3)
MONOCYTES NFR BLD AUTO: 4 %
MUCOUS THREADS #/AREA URNS LPF: PRESENT /LPF
NEUTROPHILS # BLD AUTO: 13.1 10E3/UL (ref 1.6–8.3)
NEUTROPHILS NFR BLD AUTO: 83 %
NITRATE UR QL: NEGATIVE
NRBC # BLD AUTO: 0 10E3/UL
NRBC BLD AUTO-RTO: 0 /100
PH UR STRIP: 6.5 [PH] (ref 5–7)
PLATELET # BLD AUTO: 491 10E3/UL (ref 150–450)
POTASSIUM SERPL-SCNC: 4.5 MMOL/L (ref 3.4–5.3)
RADIOLOGIST FLAGS: NORMAL
RBC # BLD AUTO: 5.47 10E6/UL (ref 3.8–5.2)
RBC URINE: >182 /HPF
SODIUM SERPL-SCNC: 141 MMOL/L (ref 135–145)
SP GR UR STRIP: 1.03 (ref 1–1.03)
SQUAMOUS EPITHELIAL: 2 /HPF
UROBILINOGEN UR STRIP-MCNC: NORMAL MG/DL
WBC # BLD AUTO: 15.9 10E3/UL (ref 4–11)
WBC URINE: 4 /HPF

## 2024-04-03 PROCEDURE — 80048 BASIC METABOLIC PNL TOTAL CA: CPT | Performed by: EMERGENCY MEDICINE

## 2024-04-03 PROCEDURE — 76830 TRANSVAGINAL US NON-OB: CPT

## 2024-04-03 PROCEDURE — 96372 THER/PROPH/DIAG INJ SC/IM: CPT | Performed by: EMERGENCY MEDICINE

## 2024-04-03 PROCEDURE — 81001 URINALYSIS AUTO W/SCOPE: CPT | Performed by: EMERGENCY MEDICINE

## 2024-04-03 PROCEDURE — 86140 C-REACTIVE PROTEIN: CPT | Performed by: EMERGENCY MEDICINE

## 2024-04-03 PROCEDURE — 250N000011 HC RX IP 250 OP 636: Performed by: EMERGENCY MEDICINE

## 2024-04-03 PROCEDURE — 84703 CHORIONIC GONADOTROPIN ASSAY: CPT | Performed by: EMERGENCY MEDICINE

## 2024-04-03 PROCEDURE — 85025 COMPLETE CBC W/AUTO DIFF WBC: CPT | Performed by: EMERGENCY MEDICINE

## 2024-04-03 PROCEDURE — 36415 COLL VENOUS BLD VENIPUNCTURE: CPT | Performed by: EMERGENCY MEDICINE

## 2024-04-03 PROCEDURE — 99285 EMERGENCY DEPT VISIT HI MDM: CPT | Mod: 25

## 2024-04-03 PROCEDURE — 250N000013 HC RX MED GY IP 250 OP 250 PS 637: Performed by: EMERGENCY MEDICINE

## 2024-04-03 RX ORDER — KETOROLAC TROMETHAMINE 15 MG/ML
30 INJECTION, SOLUTION INTRAMUSCULAR; INTRAVENOUS ONCE
Status: COMPLETED | OUTPATIENT
Start: 2024-04-03 | End: 2024-04-03

## 2024-04-03 RX ORDER — ACETAMINOPHEN 325 MG/1
975 TABLET ORAL ONCE
Status: COMPLETED | OUTPATIENT
Start: 2024-04-03 | End: 2024-04-03

## 2024-04-03 RX ADMIN — ACETAMINOPHEN 975 MG: 325 TABLET, FILM COATED ORAL at 22:52

## 2024-04-03 RX ADMIN — KETOROLAC TROMETHAMINE 30 MG: 15 INJECTION, SOLUTION INTRAMUSCULAR; INTRAVENOUS at 22:53

## 2024-04-03 ASSESSMENT — ACTIVITIES OF DAILY LIVING (ADL)
ADLS_ACUITY_SCORE: 38

## 2024-04-03 ASSESSMENT — COLUMBIA-SUICIDE SEVERITY RATING SCALE - C-SSRS
6. HAVE YOU EVER DONE ANYTHING, STARTED TO DO ANYTHING, OR PREPARED TO DO ANYTHING TO END YOUR LIFE?: NO
1. IN THE PAST MONTH, HAVE YOU WISHED YOU WERE DEAD OR WISHED YOU COULD GO TO SLEEP AND NOT WAKE UP?: NO
2. HAVE YOU ACTUALLY HAD ANY THOUGHTS OF KILLING YOURSELF IN THE PAST MONTH?: NO

## 2024-04-03 NOTE — ED TRIAGE NOTES
Here with abdominal pain and rectal pain. Hysterical in triage. Poor historian     Triage Assessment (Adult)       Row Name 04/03/24 1833          Triage Assessment    Airway WDL WDL        Respiratory WDL    Respiratory WDL WDL        Skin Circulation/Temperature WDL    Skin Circulation/Temperature WDL WDL        Cardiac WDL    Cardiac WDL WDL        Peripheral/Neurovascular WDL    Peripheral Neurovascular WDL WDL        Cognitive/Neuro/Behavioral WDL    Cognitive/Neuro/Behavioral WDL WDL

## 2024-04-04 VITALS
HEIGHT: 65 IN | BODY MASS INDEX: 21.66 KG/M2 | WEIGHT: 130 LBS | HEART RATE: 93 BPM | SYSTOLIC BLOOD PRESSURE: 114 MMHG | TEMPERATURE: 98.3 F | DIASTOLIC BLOOD PRESSURE: 67 MMHG | OXYGEN SATURATION: 99 % | RESPIRATION RATE: 19 BRPM

## 2024-04-04 LAB
C TRACH DNA SPEC QL PROBE+SIG AMP: NEGATIVE
N GONORRHOEA DNA SPEC QL NAA+PROBE: NEGATIVE
T VAGINALIS DNA SPEC QL NAA+PROBE: NOT DETECTED

## 2024-04-04 PROCEDURE — 87661 TRICHOMONAS VAGINALIS AMPLIF: CPT | Performed by: EMERGENCY MEDICINE

## 2024-04-04 PROCEDURE — 87491 CHLMYD TRACH DNA AMP PROBE: CPT | Performed by: EMERGENCY MEDICINE

## 2024-04-04 ASSESSMENT — ACTIVITIES OF DAILY LIVING (ADL)
ADLS_ACUITY_SCORE: 38

## 2024-04-04 NOTE — ED NOTES
Pt screaming, flailing and grabbing nurse during iv placement - able to obtain labs, but pt will not allow IV though IV has good blood return.

## 2024-04-04 NOTE — ED PROVIDER NOTES
"    History     Chief Complaint:  Abdominal Pain       HPI   Danny Villarael is a 28 year old female, on Eliquis, with a history of endometriosis and fibroid uterus who presents to the ED for evaluation of abdominal pain. The patient states she had a change in medication to treat her endometriosis about two weeks ago. Today her normal pain in her lower left abdomen and rectum, associated with the endometriosis, has been more severe than usual, causing vomiting. She also has more vaginal bleeding than normal but states it is not a lot. Reports having taken 1000 mg Tylenol at 1000 and 1600 as well as 5 mg oxycodone at 1130 and 1700, both without relief. States she has an appointment with her OBGYN tomorrow morning but came in to the ED because she cannot manage the pain on her own in the meantime. Notes a history of DVT, anticoagulated with Eliquis. Denies chills, fevers, diaphoresis, abnormal vaginal discharge. Denies recent surgeries.    Independent Historian:    None    Review of External Notes:  Discharge summary from 2/27/2024 reviewed when the patient was admitted for suspected PID and discharged on antibiotics for the same.    Medications:    Eliquis  Provera  Ondansetron  Simethicone     Past Medical History:    Anemia  Endometriosis  Fibroid uterus     Past Surgical History:    Hysteroscopy with morcellator    Physical Exam   Patient Vitals for the past 24 hrs:   BP Temp Temp src Pulse Resp SpO2 Height Weight   04/04/24 0045 114/67 -- -- 93 -- 99 % -- --   04/03/24 1835 123/81 98.3  F (36.8  C) Temporal (!) 137 19 100 % 1.651 m (5' 5\") 59 kg (130 lb)   04/03/24 1834 -- 98.5  F (36.9  C) Temporal -- -- -- -- --        Physical Exam  General: Laying on the ED bed  HEENT: Normocephalic, atraumatic  Cardiac: Warm and well perfused  Pulm: Breathing comfortably, no accessory muscle usage, no conversational dyspnea  GI: Abdomen soft, moderately tender to palpation in the lower abdomen with focality in the left " lower quadrant/hemipelvis and voluntary guarding, no rigidity  MSK: No bony deformities  Skin: Warm and dry  Neuro: Moves all extremities  Psych: Pleasant mood and affect    Emergency Department Course     Imaging:  US Pelvis Cmplt w Transvag & Doppler LmtPel Duplex Limited   Final Result   IMPRESSION:     1.  Limited transabdominal only imaging. Transvaginal imaging not performed per patient request.      2.  Markedly heterogeneous uterus, either due to uterine adenomyosis or leiomyomatous changes.      3.  Left ovarian 1.7 cm isoechoic lesion, indeterminate. Differential includes atypical appearance of endometrioma or hemorrhagic cyst, although solid lesion not excluded. Consider follow-up ultrasound in 6-12 weeks to document stability or resolution    versus further evaluation with contrast-enhanced pelvic MRI.      4.  Right ovary unremarkable.      5.  No pelvic fluid collection.            [Consider Follow Up: Left ovarian 1.7 cm indeterminate lesion]      This report will be copied to the [facility] to ensure a provider acknowledges the finding.              Laboratory:  Labs Ordered and Resulted from Time of ED Arrival to Time of ED Departure   BASIC METABOLIC PANEL - Abnormal       Result Value    Sodium 141      Potassium 4.5      Chloride 105      Carbon Dioxide (CO2) 20 (*)     Anion Gap 16 (*)     Urea Nitrogen 8.1      Creatinine 0.55      GFR Estimate >90      Calcium 9.9      Glucose 95     ROUTINE UA WITH MICROSCOPIC REFLEX TO CULTURE - Abnormal    Color Urine Yellow      Appearance Urine Slightly Cloudy (*)     Glucose Urine Negative      Bilirubin Urine Negative      Ketones Urine 10 (*)     Specific Gravity Urine 1.029      Blood Urine Large (*)     pH Urine 6.5      Protein Albumin Urine 30 (*)     Urobilinogen Urine Normal      Nitrite Urine Negative      Leukocyte Esterase Urine Trace (*)     Mucus Urine Present (*)     RBC Urine >182 (*)     WBC Urine 4      Squamous Epithelials  Urine 2 (*)    CBC WITH PLATELETS AND DIFFERENTIAL - Abnormal    WBC Count 15.9 (*)     RBC Count 5.47 (*)     Hemoglobin 11.6 (*)     Hematocrit 38.5      MCV 70 (*)     MCH 21.2 (*)     MCHC 30.1 (*)     RDW 24.1 (*)     Platelet Count 491 (*)     % Neutrophils 83      % Lymphocytes 13      % Monocytes 4      % Eosinophils 0      % Basophils 0      % Immature Granulocytes 0      NRBCs per 100 WBC 0      Absolute Neutrophils 13.1 (*)     Absolute Lymphocytes 2.0      Absolute Monocytes 0.7      Absolute Eosinophils 0.0      Absolute Basophils 0.0      Absolute Immature Granulocytes 0.1      Absolute NRBCs 0.0     CRP INFLAMMATION - Normal    CRP Inflammation <3.00     HCG QUALITATIVE PREGNANCY - Normal    hCG Serum Qualitative Negative     CHLAMYDIA TRACHOMATIS/NEISSERIA GONORRHOEAE BY PCR   TRICHOMONAS VAGINALIS BY PCR     Emergency Department Course & Assessments:    Interventions:  Medications   acetaminophen (TYLENOL) tablet 975 mg (975 mg Oral $Given 4/3/24 034)   ketorolac (TORADOL) injection 30 mg (30 mg Intramuscular $Given 4/3/24 2253)        Assessments:   I obtained history and performed physical exam as noted above.   Multiple reevaluations    Independent Interpretation (X-rays, CTs, rhythm strip):  None    Consultations/Discussion of Management or Tests:  None       Social Determinants of Health affecting care:  None     Disposition:  The patient was discharged.    Impression & Plan      Medical Decision Makin-year-old female presents with acute on chronic pelvic pain described above.  She has some voluntary guarding, but no rigidity or peritonitis on exam.  She is tachycardic, otherwise reassuring vital signs.  There was concern during the patient's previous hospitalization a couple months ago for PID with a fluid collection in the pelvis that cannot be accessed by IR.  She states the pain today is consistent with her baseline.  However, given her leukocytosis and tachycardia,  PID/infectious process remains in the differential.  Pelvic ultrasound shows a left ovarian cyst versus endometrioma versus solid nodule.  Discussed multiple options including empiric antibiotics for PID, discharged home with watchful waiting.  Patient prefers to follow-up with OB/GYN in the morning as previously scheduled and discuss her symptoms further as well as possible antibiotics.  She does have a leukocytosis today, but otherwise afebrile, pain resolved.  This seems like a reasonable alternative plan.  Patient discharged home.    Diagnosis:    ICD-10-CM    1. Pelvic pain  R10.2                 Scribe Disclosure:  Crystal JACKSON, am serving as a scribe at 12:00 AM on 4/4/2024 to document services personally performed by Arsalan Wahl MD based on my observations and the provider's statements to me.    4/3/2024   Arsalan Wahl MD King, Colin, MD  04/04/24 0323

## 2024-04-09 ENCOUNTER — ALLIED HEALTH/NURSE VISIT (OUTPATIENT)
Dept: FAMILY MEDICINE | Facility: CLINIC | Age: 29
End: 2024-04-09
Payer: COMMERCIAL

## 2024-04-09 DIAGNOSIS — E53.8 VITAMIN B12 DEFICIENCY: Primary | ICD-10-CM

## 2024-04-09 PROCEDURE — 99207 PR NO CHARGE NURSE ONLY: CPT

## 2024-04-09 PROCEDURE — 96372 THER/PROPH/DIAG INJ SC/IM: CPT | Performed by: INTERNAL MEDICINE

## 2024-04-09 RX ORDER — CYANOCOBALAMIN 1000 UG/ML
1000 INJECTION, SOLUTION INTRAMUSCULAR; SUBCUTANEOUS
Status: COMPLETED | OUTPATIENT
Start: 2024-04-09 | End: 2024-04-16

## 2024-04-09 RX ADMIN — CYANOCOBALAMIN 1000 MCG: 1000 INJECTION, SOLUTION INTRAMUSCULAR; SUBCUTANEOUS at 14:44

## 2024-04-16 ENCOUNTER — ALLIED HEALTH/NURSE VISIT (OUTPATIENT)
Dept: FAMILY MEDICINE | Facility: CLINIC | Age: 29
End: 2024-04-16
Payer: COMMERCIAL

## 2024-04-16 ENCOUNTER — TELEPHONE (OUTPATIENT)
Dept: FAMILY MEDICINE | Facility: CLINIC | Age: 29
End: 2024-04-16

## 2024-04-16 DIAGNOSIS — E53.8 VITAMIN B12 DEFICIENCY: Primary | ICD-10-CM

## 2024-04-16 PROCEDURE — 96372 THER/PROPH/DIAG INJ SC/IM: CPT | Performed by: INTERNAL MEDICINE

## 2024-04-16 PROCEDURE — 99207 PR NO CHARGE NURSE ONLY: CPT

## 2024-04-16 RX ADMIN — CYANOCOBALAMIN 1000 MCG: 1000 INJECTION, SOLUTION INTRAMUSCULAR; SUBCUTANEOUS at 13:28

## 2024-04-16 NOTE — TELEPHONE ENCOUNTER
General Call    Contacts         Type Contact Phone/Fax    04/16/2024 01:21 PM CDT Phone (Incoming) Danny Villareal (Self) [phone] (H)          Reason for Call:  Needing an appt.  Having pain where had blood clot    What are your questions or concerns:  Wondering if appt before May 22nd    Date of last appointment with provider: March 19th     Could we send this information to you in [place] [employer] or would you prefer to receive a phone call?:   Patient would prefer a phone call   Okay to leave a detailed message?: Yes at Home number on file [phone] (home)

## 2024-04-17 ENCOUNTER — TELEPHONE (OUTPATIENT)
Dept: FAMILY MEDICINE | Facility: CLINIC | Age: 29
End: 2024-04-17

## 2024-04-17 ENCOUNTER — OFFICE VISIT (OUTPATIENT)
Dept: FAMILY MEDICINE | Facility: CLINIC | Age: 29
End: 2024-04-17
Payer: COMMERCIAL

## 2024-04-17 VITALS
DIASTOLIC BLOOD PRESSURE: 70 MMHG | TEMPERATURE: 97.6 F | HEART RATE: 100 BPM | RESPIRATION RATE: 16 BRPM | WEIGHT: 133.5 LBS | OXYGEN SATURATION: 100 % | SYSTOLIC BLOOD PRESSURE: 108 MMHG | BODY MASS INDEX: 22.24 KG/M2 | HEIGHT: 65 IN

## 2024-04-17 DIAGNOSIS — L02.411 ABSCESS OF AXILLA, RIGHT: ICD-10-CM

## 2024-04-17 DIAGNOSIS — M79.621 PAIN IN RIGHT AXILLA: ICD-10-CM

## 2024-04-17 DIAGNOSIS — R22.31 MASS OF RIGHT AXILLA: Primary | ICD-10-CM

## 2024-04-17 DIAGNOSIS — I82.621 ACUTE DEEP VEIN THROMBOSIS (DVT) OF BRACHIAL VEIN OF RIGHT UPPER EXTREMITY (H): ICD-10-CM

## 2024-04-17 PROCEDURE — 99214 OFFICE O/P EST MOD 30 MIN: CPT | Performed by: INTERNAL MEDICINE

## 2024-04-17 RX ORDER — SULFAMETHOXAZOLE/TRIMETHOPRIM 800-160 MG
1 TABLET ORAL 2 TIMES DAILY
Qty: 20 TABLET | Refills: 0 | Status: SHIPPED | OUTPATIENT
Start: 2024-04-17

## 2024-04-17 RX ORDER — ELAGOLIX 150 MG/1
TABLET, FILM COATED ORAL
COMMUNITY
Start: 2024-03-27

## 2024-04-17 ASSESSMENT — PAIN SCALES - GENERAL: PAINLEVEL: MILD PAIN (2)

## 2024-04-17 NOTE — TELEPHONE ENCOUNTER
Patient has appointment today with PCP.       Will close the encounter.       Meenu Mcelroy RN  [facility]

## 2024-04-17 NOTE — TELEPHONE ENCOUNTER
"Spoke with radiology and they state that US MSK should be changed to US axillary.     \"[facility] and is more involved\"     US axillary is pended is provider approves.    Romina Doyle RN    "

## 2024-04-17 NOTE — PATIENT INSTRUCTIONS
As discussed , please do US of Rt Axilla lump as well as Rt axillary vein if any causing this.     ====================================    For scheduling at [facility] ([facility], [facility], [place]), call [phone] or [phone]     For scheduling in the [place] ([facility], [facility]) call  [phone] or  [phone]        For scheduling in the [place] ([facility], [facility]) call [phone]  or   [phone]

## 2024-04-17 NOTE — TELEPHONE ENCOUNTER
Pt had an appt today with doctor Gonzales. Provider placed ultrasound orders but one of the US order is incorrect.     Routing message to EC triage, please call radiology [phone] to find what needs to be corrected. Please pend correct order and route to PCP to sign.     Once correct US is placed pt needs a call back with radiology phone number to schedule test. Pt need a call back today.

## 2024-04-17 NOTE — PROGRESS NOTES
Assessment and Plan  1. Mass of right axilla  2. Abscess of axilla, right  New problem of palpable mass on the right axilla which is tender on palpation and patient has difficulty moving her as well above head.  Physical exam positive for palpable mass with no erythema or pus pocket seen for incision and drainage at this time.  Shared decision to check for ultrasound of this axilla to make sure if there is a soft tissue mass versus worsening DVT in the axillary vein as mentioned below.  -Empiric antibiotic with Bactrim started off for preventing any further worsening of this abscess if any.  Patient understood and agreed with the plan.  -Patient unable to take NSAIDs given the current Eliquis which she is using for brachial vein thrombosis.  - US Axillary , Rt ; Future  - sulfamethoxazole-trimethoprim (BACTRIM DS) 800-160 MG tablet; Take 1 tablet by mouth 2 times daily  Dispense: 20 tablet; Refill: 0      3. Pain in right axilla  New problem with worsening right upper extremity pain more in the axillary region, will recheck her ultrasound to make sure if the clot is progressing medially towards the axillary vein from the previous brachial.  Patient understood and agreed the plan on possible need of vascular surgery if there is any positive findings on this.  - US Upper Extremity Venous Duplex Right; Future    4. 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  Patient still has 1 more month left.  - US Upper Extremity Venous Duplex Right; Future         Please note that this note consists of symbols derived from keyboarding, dictation and/or voice recognition software. As a result, there may be errors in the script that have gone undetected. Please consider this when interpreting information found in this chart.    Patient Instructions   As discussed , please do US of Rt Axilla lump as  well as Rt axillary vein if any causing this.     ====================================    For scheduling at [facility] ([facility], [place]), call [phone] or [phone]     For scheduling in the [place] ([place], [facility], and [place]) call  [phone] or  [phone]        For scheduling in the South ([facility], [facility]) call [phone]  or   [phone]            Return in about 4 weeks (around 5/15/2024), or if symptoms worsen or fail to improve, for Preventative Visit.    Marielena Gonzales MD  [facility] MELODIE Delgado is a 28 year old, presenting for the following health issues:  Deep Vein Thrombosis (F/U Of Right Arm )        4/17/2024     9:52 AM   Additional Questions   Roomed by Ashlyn TAYLOR     History of Present Illness       Reason for visit:  DVT pain    She eats 4 or more servings of fruits and vegetables daily.She consumes 1 sweetened beverage(s) daily.She exercises with enough effort to increase her heart rate 20 to 29 minutes per day.  She exercises with enough effort to increase her heart rate 4 days per week.   She is taking medications regularly.       Recently seen patient on March 19, 2024 for establish care with [facility] follow-up visit for acute DVT and PID as well for which she was discharged with Eliquis as well as levofloxacin at that time.  Patient was in the ER again for similar pelvic pain on April,3rd 2024.  Patient is here for acute concerns of ongoing lower extremity pains with activity.  Requesting ibuprofen vascular surgery referral for possible thrombectomy needs.    Right axillary lump seen which is painful     No Known Allergies     Past Medical History:   Diagnosis Date    Anemia     Endometriosis     Fibroid uterus        Past Surgical History:   Procedure Laterality Date    COLONOSCOPY  02-    OPERATIVE HYSTEROSCOPY WITH  MORCELLATOR N/A 01/15/2024    Procedure: hysteroscopy with myosure polypectomy;  Surgeon: Flora Forbes MD;  Location:  OR       History reviewed. No pertinent family history.    Social History     Tobacco Use    Smoking status: Never    Smokeless tobacco: Never   Substance Use Topics    Alcohol use: Not Currently        Current Outpatient Medications   Medication Sig Dispense Refill    apixaban ANTICOAGULANT (ELIQUIS) 5 MG tablet Take 2 tablets (10 mg) by mouth 2 times daily for 7 days, THEN 1 tablet (5 mg) 2 times daily for 90 days. 97 tablet 1    ORILISSA 150 MG TABS TAKE 1 TABLET BY ORAL ROUTE EVERY DAY AT APPROXIMATELY THE SAME TIME EACH DAY      sulfamethoxazole-trimethoprim (BACTRIM DS) 800-160 MG tablet Take 1 tablet by mouth 2 times daily 20 tablet 0    apixaban ANTICOAGULANT (ELIQUIS) 5 MG tablet Take 1 tab twice daily and OK to stop in 5/27/24 with completion of treatment. (Patient not taking: Reported on 4/17/2024) 60 tablet 1    diclofenac (VOLTAREN) 1 % topical gel Apply 4 g topically 4 times daily (Patient not taking: Reported on 4/17/2024) 350 g 2    ferrous sulfate (FE TABS) 325 (65 Fe) MG EC tablet Take 1 tablet (325 mg) by mouth daily (Patient not taking: Reported on 4/17/2024) 90 tablet 1    medroxyPROGESTERone (PROVERA) 10 MG tablet Take 2 tablets (20 mg) by mouth 2 times daily for 60 days (Patient not taking: Reported on 4/17/2024) 120 tablet 1    ondansetron (ZOFRAN ODT) 4 MG ODT tab Take 1 tablet (4 mg) by mouth every 8 hours as needed for nausea (Patient not taking: Reported on 4/17/2024) 30 tablet 0    simethicone (MYLICON) 125 MG chewable tablet Take 1 tablet (125 mg) by mouth 2 times daily (Patient not taking: Reported on 4/17/2024) 20 tablet 0     Current Facility-Administered Medications   Medication Dose Route Frequency Provider Last Rate Last Admin    cyanocobalamin injection 1,000 mcg  1,000 mcg Intramuscular Q30 Days Marielena Gonzales MD   1,000 mcg at 03/22/24 1440     "cyanocobalamin injection 1,000 mcg  1,000 mcg Intramuscular Q30 Days Marielena Gonzales MD   1,000 mcg at 03/29/24 1414          Review of Systems  Constitutional, HEENT, cardiovascular, pulmonary, GI, , musculoskeletal, neuro, skin, endocrine and psych systems are negative, except as otherwise noted.      Objective    /70 (BP Location: Left arm, Patient Position: Sitting, Cuff Size: Adult Regular)   Pulse 100   Temp 97.6  F (36.4  C) (Tympanic)   Resp 16   Ht 1.64 m (5' 4.57\")   Wt 60.6 kg (133 lb 8 oz)   LMP 04/01/2024 (Approximate)   SpO2 100%   BMI 22.51 kg/m    Body mass index is 22.51 kg/m .  Physical Exam   GENERAL: alert and no distress  NECK: no adenopathy, no asymmetry, masses, or scars  RESP: lungs clear to auscultation - no rales, rhonchi or wheezes  CV: regular rate and rhythm, normal S1 S2, no S3 or S4, no murmur, click or rub, no peripheral edema  ABDOMEN: soft, nontender, no hepatosplenomegaly, no masses and bowel sounds normal  MS: no gross musculoskeletal defects noted, no edema    Breasts: normal without suspicious masses, skin changes or axillary nodes, symmetric fibrous changes in both upper outer quadrants, self exam is taught and encouraged RT AXILLA :  positive for palpable mass with no erythema or pus pocket seen for incision and drainage at this time. .      Signed Electronically by: Marielena Gonzales MD    "

## 2024-04-23 ENCOUNTER — ANCILLARY PROCEDURE (OUTPATIENT)
Dept: ULTRASOUND IMAGING | Facility: CLINIC | Age: 29
End: 2024-04-23
Attending: INTERNAL MEDICINE
Payer: COMMERCIAL

## 2024-04-23 ENCOUNTER — HOSPITAL ENCOUNTER (OUTPATIENT)
Dept: MAMMOGRAPHY | Facility: CLINIC | Age: 29
Discharge: HOME | End: 2024-04-23
Attending: INTERNAL MEDICINE
Payer: COMMERCIAL

## 2024-04-23 DIAGNOSIS — M79.621 PAIN IN RIGHT AXILLA: ICD-10-CM

## 2024-04-23 DIAGNOSIS — I82.621 ACUTE DEEP VEIN THROMBOSIS (DVT) OF BRACHIAL VEIN OF RIGHT UPPER EXTREMITY (H): ICD-10-CM

## 2024-04-23 DIAGNOSIS — R22.31 MASS OF RIGHT AXILLA: ICD-10-CM

## 2024-04-23 PROCEDURE — 93971 EXTREMITY STUDY: CPT | Mod: RT

## 2024-04-23 PROCEDURE — 76882 US LMTD JT/FCL EVL NVASC XTR: CPT | Mod: RT

## 2024-05-22 ENCOUNTER — ALLIED HEALTH/NURSE VISIT (OUTPATIENT)
Dept: FAMILY MEDICINE | Facility: CLINIC | Age: 29
End: 2024-05-22
Payer: COMMERCIAL

## 2024-05-22 DIAGNOSIS — E53.8 VITAMIN B12 DEFICIENCY (NON ANEMIC): Primary | ICD-10-CM

## 2024-05-22 PROCEDURE — 96372 THER/PROPH/DIAG INJ SC/IM: CPT | Performed by: INTERNAL MEDICINE

## 2024-05-22 PROCEDURE — 99207 PR NO CHARGE NURSE ONLY: CPT

## 2024-05-22 RX ADMIN — CYANOCOBALAMIN 1000 MCG: 1000 INJECTION, SOLUTION INTRAMUSCULAR; SUBCUTANEOUS at 14:53

## 2024-05-22 NOTE — PROGRESS NOTES
Clinic Administered Medication Documentation        Patient was given B12. Prior to medication administration, verified patient's identity using patient s name and date of birth. Please see MAR and medication order for additional information. Patient instructed to remain in clinic for 15 minutes and report any adverse reaction to staff immediately.    Vial/Syringe: Single dose vial. Was entire vial of medication used? Yes      Evelin Tsang MA on 5/22/2024 at 3:12 PM

## 2024-06-24 ENCOUNTER — ALLIED HEALTH/NURSE VISIT (OUTPATIENT)
Dept: FAMILY MEDICINE | Facility: CLINIC | Age: 29
End: 2024-06-24
Payer: COMMERCIAL

## 2024-06-24 DIAGNOSIS — D64.9 ANEMIA, UNSPECIFIED TYPE: Primary | ICD-10-CM

## 2024-06-24 PROCEDURE — 99207 PR NO CHARGE NURSE ONLY: CPT

## 2024-06-24 PROCEDURE — 96372 THER/PROPH/DIAG INJ SC/IM: CPT | Performed by: INTERNAL MEDICINE

## 2024-06-24 RX ADMIN — CYANOCOBALAMIN 1000 MCG: 1000 INJECTION, SOLUTION INTRAMUSCULAR; SUBCUTANEOUS at 10:12

## 2024-06-24 NOTE — PROGRESS NOTES
Clinic Administered Medication Documentation        Patient was given Cyanocobalamin B12. Prior to medication administration, verified patient's identity using patient s name and date of birth. Please see MAR and medication order for additional information. Patient instructed to remain in clinic for 15 minutes and report any adverse reaction to staff immediately.    Vial/Syringe: Single dose vial. Was entire vial of medication used? Yes    Evelin Tsang MA on 6/24/2024 at 10:08 AM

## 2025-03-16 ENCOUNTER — HEALTH MAINTENANCE LETTER (OUTPATIENT)
Age: 30
End: 2025-03-16

## 2025-03-25 ENCOUNTER — OFFICE VISIT (OUTPATIENT)
Dept: URGENT CARE | Facility: URGENT CARE | Age: 30
End: 2025-03-25
Payer: COMMERCIAL

## 2025-03-25 VITALS
HEART RATE: 83 BPM | BODY MASS INDEX: 23.44 KG/M2 | TEMPERATURE: 97.6 F | WEIGHT: 139 LBS | SYSTOLIC BLOOD PRESSURE: 114 MMHG | DIASTOLIC BLOOD PRESSURE: 80 MMHG | RESPIRATION RATE: 17 BRPM | OXYGEN SATURATION: 100 %

## 2025-03-25 DIAGNOSIS — M79.621 PAIN OF RIGHT UPPER ARM: Primary | ICD-10-CM

## 2025-03-25 DIAGNOSIS — M79.601 PAIN AND SWELLING OF RIGHT UPPER EXTREMITY: ICD-10-CM

## 2025-03-25 DIAGNOSIS — M79.89 PAIN AND SWELLING OF RIGHT UPPER EXTREMITY: ICD-10-CM

## 2025-03-25 DIAGNOSIS — Z86.718 PERSONAL HISTORY OF DVT (DEEP VEIN THROMBOSIS): ICD-10-CM

## 2025-03-25 PROCEDURE — 99214 OFFICE O/P EST MOD 30 MIN: CPT | Performed by: NURSE PRACTITIONER

## 2025-03-25 NOTE — PROGRESS NOTES
Chief Complaint   Patient presents with    Urgent Care     Pt presents pain, swelling and numbness(slight) in R arm, pt had blood clot in same arm last year.          ICD-10-CM    1. Pain of right upper arm  M79.621       2. Pain and swelling of right upper extremity  M79.601     M79.89       3. Personal history of DVT (deep vein thrombosis)  Z86.718       Patient was advised to go to the emergency room to rule out DVT.  She does not want to go to the emergency room due to cost.  We discussed the risks associated with possible DVT including but not limited to pulmonary embolus, stroke and heart attack.  She understands there is a risk with waiting to be seen but she would prefer to be seen at the acute diagnostic service center tomorrow for evaluation.  She will use ibuprofen tonight as needed for pain.    Red flag warning signs and when to go to the emergency room discussed.  Reviewed potential adverse reactions to medications.    Subjective     Danny Villareal is an 29 year old female who presents to clinic today for development of pain in right upper arm and axilla for the last 24 hours.  Symptoms have been gradually getting worse.  She does have a history of DVT 1 year ago and was on Eliquis for 3 months after.  She is taking oral contraceptives.  There is been no trauma to the area.  She has not started any new activities that require more use of the arm    Objective    /80   Pulse 83   Temp 97.6  F (36.4  C) (Oral)   Resp 17   Wt 63 kg (139 lb)   SpO2 100%   BMI 23.44 kg/m    Nurses notes and VS have been reviewed.    Physical Exam     Alert and oriented, 3+ radial pulses bilaterally, normal sensation to fingertips bilaterally, swelling noted in the right upper arm, very tender along the right bicep up into the axilla, no erythema is noted, S1, S2, lung sounds are clear      Sydni Noriega, APRN, CNP  [facility]    The use of Dragon/[employer] dictation services may have  been used to construct the content in this note; any grammatical or spelling errors are non-intentional. Please contact the author of this note directly if you are in need of any clarification.

## 2025-03-25 NOTE — PATIENT INSTRUCTIONS
Return tomorrow at 9:15 AM to be assessed at the acute and diagnostic services at the [facility].

## 2025-03-26 ENCOUNTER — OFFICE VISIT (OUTPATIENT)
Dept: PEDIATRICS | Facility: CLINIC | Age: 30
End: 2025-03-26
Payer: COMMERCIAL

## 2025-03-26 ENCOUNTER — ANCILLARY PROCEDURE (OUTPATIENT)
Dept: ULTRASOUND IMAGING | Facility: CLINIC | Age: 30
End: 2025-03-26
Attending: NURSE PRACTITIONER
Payer: COMMERCIAL

## 2025-03-26 VITALS
OXYGEN SATURATION: 99 % | BODY MASS INDEX: 23.16 KG/M2 | SYSTOLIC BLOOD PRESSURE: 106 MMHG | DIASTOLIC BLOOD PRESSURE: 72 MMHG | WEIGHT: 139 LBS | HEART RATE: 97 BPM | TEMPERATURE: 97.3 F | HEIGHT: 65 IN | RESPIRATION RATE: 16 BRPM

## 2025-03-26 DIAGNOSIS — M79.89 SWELLING OF RIGHT UPPER EXTREMITY: Primary | ICD-10-CM

## 2025-03-26 DIAGNOSIS — S46.811A STRAIN OF TRAPEZIUS MUSCLE, RIGHT, INITIAL ENCOUNTER: ICD-10-CM

## 2025-03-26 DIAGNOSIS — M79.621 PAIN OF RIGHT UPPER ARM: ICD-10-CM

## 2025-03-26 DIAGNOSIS — Z86.718 HISTORY OF DEEP VENOUS THROMBOSIS: ICD-10-CM

## 2025-03-26 PROCEDURE — 99213 OFFICE O/P EST LOW 20 MIN: CPT | Performed by: NURSE PRACTITIONER

## 2025-03-26 PROCEDURE — 93971 EXTREMITY STUDY: CPT | Mod: RT

## 2025-03-26 NOTE — PATIENT INSTRUCTIONS
Apply ice for 20 minutes 3-4 times a day to areas that are uncomfortable.      May consider using heat on your neck and upper back area in addition to the ice.    Ibuprofen 2 to 3 tablets every 6 hours as needed for pain.    Consider a deep tissue massage.    Be sure your workstation is set up ergonomically correctly.

## 2025-03-26 NOTE — PROGRESS NOTES
Acute and Diagnostic Services Clinic Visit    Chief Complaint   Patient presents with    Arm Pain         ICD-10-CM    1. Swelling of right upper extremity  M79.89 US Upper Extremity Venous Duplex Right      2. History of deep venous thrombosis  Z86.718 US Upper Extremity Venous Duplex Right      3. Pain of right upper arm  M79.621       4. Strain of trapezius muscle, right, initial encounter  S46.811A         Ultrasound negative for DVT.  I believe her discomfort is related to poor ergonomic set up for her work.  Gave patient information on proper computer chair keyboard set up.  Suggested she obtain a deep tissue massage in the upper neck and trapezius area, ibuprofen, ice and/or heat.  Gentle range of motion to arm.    Red flag warning signs and when to go to the emergency room discussed.  Reviewed potential adverse reactions to medications.      Results for orders placed or performed in visit on 03/26/25 (from the past 24 hours)   US Upper Extremity Venous Duplex Right    Narrative    EXAM: US UPPER EXTREMITY VENOUS DUPLEX RIGHT  LOCATION: [facility]  DATE: 3/26/2025    INDICATION: Right upper extremity pain  COMPARISON: None.  TECHNIQUE: Venous Duplex ultrasound of the right upper extremity with (when possible) and without compression, augmentation, and duplex. Color flow and spectral Doppler with waveform analysis performed.    FINDINGS: Ultrasound includes evaluation of the internal jugular vein, innominate vein, subclavian vein, axillary vein, and brachial vein. The superficial cephalic and basilic veins were also evaluated where seen.     RIGHT: No deep venous thrombosis. No superficial thrombophlebitis.      Impression    IMPRESSION:  1.  No deep venous thrombosis in the right upper extremity.       Evaluation for possible DVT  Onset/Duration: 2 days  Description:       Location: Right upper arm       Redness: no        Pain: moderate       Warmth: no        Joint swelling  "YES  Progression of symptoms same  Accompanying signs and symptoms:       Fevers: no        Numbness/tingling/weakness: YES       Chest pain/pleurisy: no        Shortness of breath: no   History        Trauma: no         Recent travel/when: no         Previous history of DVT: YES        Family history of DVT: no         Recent surgery: no   Aggravating factors include: none  Therapies tried and outcome: nothing  Prior surgery on arteries of veins in this area: No    She also has pain into the right side of her neck and upper back that is new over the last 24 hours.    ROS: 10 point ROS neg other than the symptoms noted above in the HPI.       Objective    /72 (BP Location: Left arm, Patient Position: Sitting, Cuff Size: Adult Regular)   Pulse 97   Temp 97.3  F (36.3  C)   Resp 16   Ht 1.64 m (5' 4.57\")   Wt 63 kg (139 lb)   SpO2 99%   BMI 23.44 kg/m    Nurses notes and VS have been reviewed.    Physical Exam       GENERAL APPEARANCE: alert and moderate distress     EYES: PERRL, EOMI, sclera non-icteric     HENT: oral exam benign, mucus membranes intact, without ulcers or lesions     NECK: no adenopathy or asymmetry, thyroid normal to palpation     RESP: lungs clear to auscultation - no rales, rhonchi or wheezes     CV: regular rates and rhythm, no murmurs, rubs, or gallop     MS: extremities normal- no gross deformities noted; normal muscle tone, except for right upper arm which has tenderness from just below the elbow up into the axillary, slight swelling noted, worsens with movement.  Tender over the right side of neck into the trapezius     SKIN: no suspicious lesions or rashes     NEURO: Normal strength and tone, mentation intact and speech normal     PSYCH: normal thought process; no significant mood disturbance      Sydni Noriega, JUNE, CNP  [facility]    The use of Dragon/[employer] dictation services may have been used to construct the content in this note; any " grammatical or spelling errors are non-intentional. Please contact the author of this note directly if you are in need of any clarification.

## (undated) DEVICE — PACK TVT HYSTEROSCOPY SMA15HYFSE

## (undated) DEVICE — LINEN TOWEL PACK X5 5464

## (undated) DEVICE — SOL WATER IRRIG 1000ML BOTTLE 2F7114

## (undated) DEVICE — KIT PROCEDURE FLUENT IN/OUT FLOWPAK TISS TRAP FLT-112S

## (undated) DEVICE — SOL NACL 0.9% IRRIG 3000ML BAG 07972-08

## (undated) DEVICE — DEVICE TISSUE REMOVAL HYSTEROSCOPIC MYOSURE LITE 30-401LITE

## (undated) DEVICE — DECANTER BAG 2002S

## (undated) DEVICE — GOWN IMPERVIOUS SPECIALTY XL/XLONG 39049

## (undated) RX ORDER — NALOXONE HYDROCHLORIDE 0.4 MG/ML
INJECTION, SOLUTION INTRAMUSCULAR; INTRAVENOUS; SUBCUTANEOUS
Status: DISPENSED
Start: 2024-02-23

## (undated) RX ORDER — CEFAZOLIN SODIUM/WATER 2 G/20 ML
SYRINGE (ML) INTRAVENOUS
Status: DISPENSED
Start: 2024-01-15

## (undated) RX ORDER — LIDOCAINE HYDROCHLORIDE 10 MG/ML
INJECTION, SOLUTION EPIDURAL; INFILTRATION; INTRACAUDAL; PERINEURAL
Status: DISPENSED
Start: 2024-02-23

## (undated) RX ORDER — FENTANYL CITRATE 50 UG/ML
INJECTION, SOLUTION INTRAMUSCULAR; INTRAVENOUS
Status: DISPENSED
Start: 2024-01-15

## (undated) RX ORDER — ONDANSETRON 2 MG/ML
INJECTION INTRAMUSCULAR; INTRAVENOUS
Status: DISPENSED
Start: 2024-01-15

## (undated) RX ORDER — KETOROLAC TROMETHAMINE 30 MG/ML
INJECTION, SOLUTION INTRAMUSCULAR; INTRAVENOUS
Status: DISPENSED
Start: 2024-01-15

## (undated) RX ORDER — HYDROXYZINE HYDROCHLORIDE 25 MG/1
TABLET, FILM COATED ORAL
Status: DISPENSED
Start: 2024-01-15

## (undated) RX ORDER — ACETAMINOPHEN 325 MG/1
TABLET ORAL
Status: DISPENSED
Start: 2024-01-15

## (undated) RX ORDER — DEXAMETHASONE SODIUM PHOSPHATE 4 MG/ML
INJECTION, SOLUTION INTRA-ARTICULAR; INTRALESIONAL; INTRAMUSCULAR; INTRAVENOUS; SOFT TISSUE
Status: DISPENSED
Start: 2024-01-15

## (undated) RX ORDER — FLUMAZENIL 0.1 MG/ML
INJECTION, SOLUTION INTRAVENOUS
Status: DISPENSED
Start: 2024-02-23

## (undated) RX ORDER — FENTANYL CITRATE 50 UG/ML
INJECTION, SOLUTION INTRAMUSCULAR; INTRAVENOUS
Status: DISPENSED
Start: 2024-02-23